# Patient Record
Sex: FEMALE | Race: WHITE | NOT HISPANIC OR LATINO | Employment: UNEMPLOYED | URBAN - METROPOLITAN AREA
[De-identification: names, ages, dates, MRNs, and addresses within clinical notes are randomized per-mention and may not be internally consistent; named-entity substitution may affect disease eponyms.]

---

## 2017-10-03 ENCOUNTER — ALLSCRIPTS OFFICE VISIT (OUTPATIENT)
Dept: OTHER | Facility: OTHER | Age: 32
End: 2017-10-03

## 2017-10-27 NOTE — CONSULTS
Assessment  1  Hypothyroidism (244 9) (E03 9)   2  Hashimoto's thyroiditis (245 2) (E06 3)   3  Multiple thyroid nodules (241 1) (E04 2)    Plan  Hashimoto's thyroiditis, Hypothyroidism    · Synthroid 137 MCG Oral Tablet (Levothyroxine Sodium); 1 Tab daily   Rx By: Cally Lowe; Dispense: 30 Days ; #:30 Tablet; Refill: 3;For: Hashimoto's thyroiditis, Hypothyroidism; VEE = Y; Verified Transmission to [facility]Y #[id number] Last Updated By: System, [employer]; 10/3/2017 9:27:58 AM   · (1) T4, FREE; Status:Active; Requested JTQ:[id number];    Perform:[facility]; VNZ:[id number]; Ordered;For:Hashimoto's thyroiditis, Hypothyroidism; Ordered By:Ariel Ray;   · (1) TSH; Status:Active; Requested ZDA:[id number];    Perform:[facility]; VYN:[id number]; Ordered;For:Hashimoto's thyroiditis, Hypothyroidism; Ordered By:Ariel Ray;  Hypothyroidism    · Follow-up visit in 6 weeks Evaluation and Treatment  Follow-up  Status: Complete  Done:  [id number]   Ordered; For: Hypothyroidism; Ordered By: Cally Lowe Performed:  Due: [id number]; Last Updated By: Lenny Kate; 10/3/2017 9:28:44 AM    Discussion/Summary  Hypothyroidism secondary to Hashimoto thyroiditis she is not taking her Synthroid properly some days which can lead to fluctuation in her levels, for now I have advised her to take Synthroid on an empty stomach, just with water, and wait 1 hour before breakfast, repeat thyroid function test after she has been on a consistent dose for at least 6 weeks  Goal TSH between 1 and 2    Discussed with her that thyroid hormone requirement increased during pregnancy and she may need adjustment in her dose when she is pregnant, she is to call the office right away when she is pregnant so thyroid function tests can be checked and dose adjusted appropriately    Multiple thyroid nodule-right-sided thyroid nodule biopsied in 2015, felt to be consistent with Hashimoto thyroiditis however underwent workup for lymphoma given the biopsy showing monoclonal B-cell, I a m  requesting records from her oncologist  Will repeat a thyroid ultrasound next year as she had an ultrasound done in July 2017, she will get me records of thyroid ultrasound-   Counseling Documentation With Imm: The patient was counseled regarding diagnostic results,-- instructions for management,-- risk factor reductions,-- impressions,-- risks and benefits of treatment options,-- importance of compliance with treatment  total time of encounter was 40 minutes-- and-- 25 minutes was spent counseling  Medication SE Review and Pt Understands Tx: Possible side effects of new medications were reviewed with the patient/guardian today  The treatment plan was reviewed with the patient/guardian  The patient/guardian understands and agrees with the treatment plan   Patient's Capacity to Self-Care: Patient is able to Self-Care  Chief Complaint  Chief Complaint Free Text Note Form: Consult  History of Present Illness  HPI: 20-year-old woman here for evaluation of thyroid dysfunction  She has History of Hashimoto's thyroiditis and hypothyroidism diagnosed as a teenager -on thyroid hormone replacement in early 25s - also has history of thyroid nodules- initially diagnosed in 2012 -largest nodules in the right lobe with varying sizes over the years -underwent fine-needle aspiration biopsy of the largest right-sided thyroid nodule in 2015 - consistent with Hashimoto's thyroiditis -how ever because of abundant lymphocytic cells of flow cytometry was performed which showed 7% of the cells with phenotypically abnormal features concerning for B-cell lymphoma- underwent workup with oncologist -according to the patient she underwent a PET scan which was negative    hypothyroidism she is currently on Synthroid 137 mcg for the past few months - dose increased from 125 mcg -she was on Synthroid 125 mcg from 2013 - till summer of 2017 it on empty stomach sometimes with coffee or just before eating - sometimes doubles up if she skips   cycles - regular   healthy pregnancy in 2013 last year at 8 1/2 weeks   able to conceive - trying for 2 years gain -30 lbs in past year , no constipation , no sleep changes and fatigue   of vitiligo since teens   FH of thyroid cancer , no h/o RT to neck   vitamin D deficiency - on vitamin D3 5000 IU DAILY            Review of Systems  Endo Adult ROS Female New Patient:   Constitutional/General: no change in ring size,-- no change in shoe size,-- chills,-- no dizziness,-- no fainting,-- fatigue,-- no fever,-- no forgetfulness,-- headache,-- no loss of sleep,-- no recent weight loss,-- no nervousness,-- no numbness,-- no temperature intolerance,-- no excessive sweating-- and-- weight gain  Muscle/Joint/Bone: back pain-- and-- hip pain, but-- no arm pain,-- no leg pain,-- no foot pain,-- no neck pain,-- no hand pain,-- no shoulder pain,-- no arm weakness,-- no back weakness,-- no hip weakness,-- no leg weakness,-- no foot weakness,-- no neck weakness,-- no hand weakness,-- no shoulder weakness,-- no arm numbness,-- no back numbness,-- no hip numbness,-- no leg numbness,-- no foot numbness,-- no neck numbness,-- no hand numbness-- and-- no shoulder numbness  Gastrointestinal: no constipation,-- no diarrhea,-- no excessive hunger,-- no excessive thirst,-- no nausea,-- no poor appetite,-- no rectal bleeding,-- no stomach pain,-- no vomiting-- and-- no vomiting blood  Cardiovascular: no chest pain,-- no hypertension,-- no irregular heart beat,-- no hypotension,-- no poor circulation,-- no rapid heart beat-- and-- no ankle swelling  Eye/Ear/Nose/Throat: gritty eyes, but-- no bleeding gums,-- no blurred vision,-- no difficulty swallowing,-- no double vision,-- no hoarseness,-- no hearing loss,-- no persistent cough,-- no sinus problems,-- not seeing flashes-- and-- not seeing halos     Skin: no easy bruising,-- no hives,-- no itching,-- no change in a mole,-- no rashes,-- no scar-- and-- no slow healing sores  Genitourinary no blood in urine,-- no frequent urination,-- no night time urination-- and-- no painful urination  Genitourinary - Reproductive 4 Children, but-- normal period,-- no bleeding between periods,-- no breast lump,-- no hot flashes,-- no nipple discharge,-- no vaginal discharge-- and-- not pregnant  date of last menstruation is unknown  the interval of the LMP is unknown periods usually lasts an unspecified duration   ROS Reviewed:   ROS reviewed  Past Medical History  1  History of migraine (V12 49) (Z86 69)   2  History of Vitiligo (709 01) (L80)  Active Problems And Past Medical History Reviewed: The active problems and past medical history were reviewed and updated today  Surgical History  1  History of Dilation And Curettage  Surgical History Reviewed: The surgical history was reviewed and updated today  Family History  Mother    1  Family history of multiple sclerosis (V17 2) (Z82 0)  Sister    2  Family history of celiac disease (V18 59) (Z83 79)  Brother    3  Family history of Hashimoto thyroiditis (V18 19) (Z83 49)  Family History Reviewed: The family history was reviewed and updated today  Social History  Social History Reviewed: The social history was reviewed and updated today  Current Meds   1  Prenatal Complete TABS Recorded   2  Synthroid 137 MCG Oral Tablet; 1 TAB DAILY Recorded   3  Vitamin D3 5000 UNIT Oral Capsule Recorded  Medication List Reviewed: The medication list was reviewed and updated today  Allergies  1  No Known Drug Allergies    Vitals  Vital Signs    Recorded: 29DCH4976 08:43AM   Heart Rate 72   Systolic 840   Diastolic 84   Height 5 ft 8 in   Weight 199 lb 4 00 oz   BMI Calculated 30 3   BSA Calculated 2 05     Physical Exam    Constitutional   General appearance: No acute distress, well appearing and well nourished      Eyes   Conjunctiva and lids: No swelling, erythema, or discharge  Pupils: Equal, round and reactive to light  The sclera are anicteric  Extraocular movements are intact  Ears, Nose, Mouth, and Throat   External inspection of ears, nose and lips: Normal     Exam of Head: The head is atraumatic and normocephalic  Neck: Abnormal  -- Mild thyromegaly, no distinct thyroid nodules  Pulmonary   Auscultation of lungs: Clear to auscultation bilaterally with normal chest expansion  Cardiovascular   Auscultation of heart: Normal rate and rhythm with no murmurs, gallops or rubs  Examination of extremities for edema and/or varicosities: Normal     Abdomen   Abdomen: Abdomen is soft, non-tender with normal bowel sounds  Lymphatic   Palpation of lymph nodes: No supraclavicular or suboccipital lymphadenopathy  Musculoskeletal   Gait and station: Normal     Inspection/palpation of joints, bones, and muscles: Muscle bulk and tone is normal     Skin   Skin and subcutaneous tissue: Normal skin temperature and color  Neurologic   Motor Strength: Strength is 5/5 bilaterally  Psychiatric   Orientation to person, place and time: Normal     Mood and affect: Affect and attention span are normal        Results/Data  Office Record Review: I have reviewed the office records and my summary is as follows: I have requested any additional records  I have reviewed office records from Dr Justin Danielle from December 2016 regarding hypothyroidism and thyroid noduleshave requested medical records from oncologist as well as fertility specialist  I have reviewed laboratory results as follows: January 2017-TSH 1 89, free T4 1 3  Diagnostic Studies Reviewed:     Ultrasound July 2016-thyroid ultrasound-heterogenously gland with areas of subtle focal nodularity, right mid pole nodule 0 9 x 0 3 x 1 cm, upper pole nodule 0 6 x 0 9 x 0 4 cm and mid pole nodule 0 3 x 0 2 x 0 3 cmmid pole 0 6 x 0 3 x 0 6 cm nodule     Diagnostic Review Fine-needle aspiration biopsy right 1 7 cm thyroid nodule-April 2015phenotypically abnormal cells detected- monoclonal B cells phenotype, kappa positive, findings suggest involvement by a B-cell lymphoma such as follicular lymphoma however clinical and morphologic correlation needed  Future Appointments    Date/Time Provider Specialty Site   11/21/2017 11:40 AM LES Varela   Endocrinology [facility] ENDOCRINOLOGY     Signatures   Electronically signed by : LES Alvarez ; Oct  3 2017  1:46PM EST                       (Author)

## 2017-11-14 DIAGNOSIS — E06.3 AUTOIMMUNE THYROIDITIS: ICD-10-CM

## 2017-11-14 DIAGNOSIS — E03.9 HYPOTHYROIDISM: ICD-10-CM

## 2018-01-14 VITALS
BODY MASS INDEX: 30.2 KG/M2 | DIASTOLIC BLOOD PRESSURE: 84 MMHG | HEART RATE: 72 BPM | WEIGHT: 199.25 LBS | HEIGHT: 68 IN | SYSTOLIC BLOOD PRESSURE: 110 MMHG

## 2018-03-06 ENCOUNTER — APPOINTMENT (OUTPATIENT)
Dept: LAB | Facility: CLINIC | Age: 33
End: 2018-03-06
Payer: COMMERCIAL

## 2018-03-06 DIAGNOSIS — E06.3 AUTOIMMUNE THYROIDITIS: ICD-10-CM

## 2018-03-06 DIAGNOSIS — E03.9 HYPOTHYROIDISM: ICD-10-CM

## 2018-03-06 LAB
T4 FREE SERPL-MCNC: 1.21 NG/DL (ref 0.76–1.46)
TSH SERPL DL<=0.05 MIU/L-ACNC: 0.16 UIU/ML (ref 0.36–3.74)

## 2018-03-06 PROCEDURE — 84443 ASSAY THYROID STIM HORMONE: CPT

## 2018-03-06 PROCEDURE — 36415 COLL VENOUS BLD VENIPUNCTURE: CPT

## 2018-03-06 PROCEDURE — 84439 ASSAY OF FREE THYROXINE: CPT

## 2018-03-15 ENCOUNTER — TELEPHONE (OUTPATIENT)
Dept: ENDOCRINOLOGY | Facility: CLINIC | Age: 33
End: 2018-03-15

## 2018-03-15 ENCOUNTER — OFFICE VISIT (OUTPATIENT)
Dept: FAMILY MEDICINE CLINIC | Facility: CLINIC | Age: 33
End: 2018-03-15
Payer: COMMERCIAL

## 2018-03-15 VITALS
HEART RATE: 76 BPM | BODY MASS INDEX: 30.87 KG/M2 | SYSTOLIC BLOOD PRESSURE: 122 MMHG | DIASTOLIC BLOOD PRESSURE: 84 MMHG | WEIGHT: 208.4 LBS | RESPIRATION RATE: 16 BRPM | HEIGHT: 69 IN

## 2018-03-15 DIAGNOSIS — E04.1 THYROID NODULE: ICD-10-CM

## 2018-03-15 DIAGNOSIS — N97.9 FEMALE INFERTILITY, SECONDARY: ICD-10-CM

## 2018-03-15 DIAGNOSIS — L25.9 CONTACT DERMATITIS, UNSPECIFIED CONTACT DERMATITIS TYPE, UNSPECIFIED TRIGGER: Primary | ICD-10-CM

## 2018-03-15 DIAGNOSIS — E03.8 HYPOTHYROIDISM DUE TO HASHIMOTO'S THYROIDITIS: ICD-10-CM

## 2018-03-15 DIAGNOSIS — E06.3 HYPOTHYROIDISM DUE TO HASHIMOTO'S THYROIDITIS: ICD-10-CM

## 2018-03-15 PROBLEM — L80 VITILIGO: Status: ACTIVE | Noted: 2018-03-15

## 2018-03-15 PROBLEM — E04.2 MULTIPLE THYROID NODULES: Status: ACTIVE | Noted: 2017-10-03

## 2018-03-15 PROCEDURE — 99203 OFFICE O/P NEW LOW 30 MIN: CPT | Performed by: PHYSICIAN ASSISTANT

## 2018-03-15 RX ORDER — LEVOTHYROXINE SODIUM 137 MCG
137 TABLET ORAL DAILY
Refills: 3 | COMMUNITY
Start: 2018-02-03 | End: 2018-03-21 | Stop reason: SDUPTHER

## 2018-03-15 RX ORDER — ACETAMINOPHEN 160 MG
2000 TABLET,DISINTEGRATING ORAL DAILY
COMMUNITY
End: 2020-12-03 | Stop reason: ALTCHOICE

## 2018-03-15 RX ORDER — TRIAMCINOLONE ACETONIDE 5 MG/G
CREAM TOPICAL 3 TIMES DAILY
Qty: 30 G | Refills: 0 | Status: SHIPPED | OUTPATIENT
Start: 2018-03-15 | End: 2018-09-06 | Stop reason: ALTCHOICE

## 2018-03-15 RX ORDER — ASPIRIN 81 MG/1
81 TABLET ORAL DAILY
COMMUNITY
End: 2018-09-06 | Stop reason: ALTCHOICE

## 2018-03-15 NOTE — TELEPHONE ENCOUNTER
Osmin Colindres To Sent  3/15/2018 10:50 AM   Hi Dr Nury Tucker,   I am sorry I have not been back to follow-up with you  I was hoping to see you last week, but we had to cancel due to the weather  I am now scheduled to follow-up with a CRNP in your group based upon availability, on 4/3  At our initial meeting, we discussed getting a good  on my synthroid dose  I have been taking 137mcg for some time now, probably close to 2 years, with some lability in my TSH  Since we met in the fall I have been very careful to take my morning dose on an empty stomach upon awakening  I have had a handful of TSH checks with [facility], and they are faxing those results to you today  I also had my TSH and Free T4 checked based on your order last week  You will see over the past 6+ weeks my Tsh has gone down to now being a bit too low at 0 10, 0 09 (most recent result from [facility])  Only change that I have made in these past 6 weeks is reintroducing exercise in my routine, not sure if that could explain this? If you wish for me to continue on 137mcg, or make an adjustment, I do need a new prescription for synthroid brand necessary to my [facility] pharmacy for one month supply  I will then see your team on 4/3 and go from there  Thank you!      Osmin Colindres

## 2018-03-15 NOTE — PROGRESS NOTES
Assessment/Plan:    1  Contact dermatitis, unspecified contact dermatitis type, unspecified trigger    - c/w cream as needed, never longer than 2 weeks at a time  - triamcinolone (KENALOG) 0 5 % cream; Apply topically 3 (three) times a day  Dispense: 30 g; Refill: 0    2  Hypothyroidism due to Hashimoto's thyroiditis    - on synthroid 137 mcg once daily    3  Thyroid nodule    - yearly u/s  - under care [facility]    4  Secondary Infertility    - under care [facility]    F/u as needed      Subjective:   Chief Complaint   Patient presents with    [facility]      Patient ID: Osmin Colindres is a 27 y o  female  Here to [facility] care  CRNP moved from [facility] in 2017   is a Pulmonary Critical Care doctor for , patient was a former CRNP in pulm also  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  Thyroid megaly with nodule, 1cm > was biopsied consistent with hashimotos  Very active autoimmune nodule  Saw nodule specialist, lymphoma specialist  Ultrasound once yearly  Dr Óscar Bro to follow  Also has "contact dermatitis" on triamcinolone as needed, needs refill  Under going fertitlity treatment with [facility]               The following portions of the patient's history were reviewed and updated as appropriate: allergies, current medications, past family history, past medical history, past social history, past surgical history and problem list     Past Medical History:   Diagnosis Date    H/O Hashimoto thyroiditis     Migraine     Thyroid nodule     Vertigo     Vitiligo     Vitiligo      Past Surgical History:   Procedure Laterality Date    BREAST SURGERY      RECONSTRUCTION     SECTION       SECTION      DILATION AND CURETTAGE OF UTERUS      REDUCTION MAMMAPLASTY       Family History   Problem Relation Age of Onset    Hypertension Mother     Multiple sclerosis Mother     Hashimoto's thyroiditis Mother     Celiac disease Sister    Mavis Cousin Thyroid disease Sister     Hashimoto's thyroiditis Brother     Lupus Brother     No Known Problems Father     Mental illness Neg Hx     Substance Abuse Neg Hx      Social History     Social History    Marital status: /Civil Union     Spouse name: N/A    Number of children: N/A    Years of education: N/A     Occupational History    Not on file  Social History Main Topics    Smoking status: Never Smoker    Smokeless tobacco: Never Used    Alcohol use 3 0 oz/week     5 Glasses of wine per week      Comment: occasionally    Drug use: No    Sexual activity: Not on file     Other Topics Concern    Not on file     Social History Narrative    CAFFEINE USE       Current Outpatient Prescriptions:     aspirin (ECOTRIN LOW STRENGTH) 81 mg EC tablet, Take 81 mg by mouth daily, Disp: , Rfl:     Cholecalciferol (VITAMIN D3) 2000 units capsule, Take by mouth, Disp: , Rfl:     PRENATAL MULTIVIT-MIN-FE-FA PO, Take 1 tablet by mouth, Disp: , Rfl:     SYNTHROID 137 MCG tablet, Take 137 mcg by mouth daily, Disp: , Rfl: 3    Review of Systems          Objective:    Vitals:    03/15/18 0925   BP: 122/84   BP Location: Left arm   Patient Position: Sitting   Cuff Size: Adult   Pulse: 76   Resp: 16   Weight: 94 5 kg (208 lb 6 4 oz)   Height: 5' 9" (1 753 m)        Physical Exam   Constitutional: She is oriented to person, place, and time  She appears well-developed and well-nourished  Cardiovascular: Normal rate and normal heart sounds  Pulmonary/Chest: Effort normal and breath sounds normal    Neurological: She is alert and oriented to person, place, and time  Skin: Skin is warm  Psychiatric: She has a normal mood and affect

## 2018-03-15 NOTE — TELEPHONE ENCOUNTER
TSH from March was slightly low at 0 15, for now take Synthroid 137 mcg 1 tablet Monday to Saturday and half a tablet on Sunday    Keep the upcoming appointment in April

## 2018-03-21 DIAGNOSIS — E03.9 HYPOTHYROIDISM, UNSPECIFIED TYPE: Primary | ICD-10-CM

## 2018-03-21 RX ORDER — LEVOTHYROXINE SODIUM 137 MCG
TABLET ORAL
Qty: 30 TABLET | Refills: 3 | Status: SHIPPED | OUTPATIENT
Start: 2018-03-21 | End: 2018-03-22 | Stop reason: SDUPTHER

## 2018-03-22 DIAGNOSIS — E03.9 HYPOTHYROIDISM, UNSPECIFIED TYPE: ICD-10-CM

## 2018-03-22 RX ORDER — LEVOTHYROXINE SODIUM 137 MCG
137 TABLET ORAL DAILY
Qty: 30 TABLET | Refills: 0 | Status: SHIPPED | OUTPATIENT
Start: 2018-03-22 | End: 2018-07-19 | Stop reason: SDUPTHER

## 2018-04-03 ENCOUNTER — OFFICE VISIT (OUTPATIENT)
Dept: ENDOCRINOLOGY | Facility: CLINIC | Age: 33
End: 2018-04-03
Payer: COMMERCIAL

## 2018-04-03 VITALS
HEART RATE: 80 BPM | BODY MASS INDEX: 31.5 KG/M2 | WEIGHT: 213.3 LBS | DIASTOLIC BLOOD PRESSURE: 68 MMHG | SYSTOLIC BLOOD PRESSURE: 108 MMHG

## 2018-04-03 DIAGNOSIS — E04.2 MULTIPLE THYROID NODULES: ICD-10-CM

## 2018-04-03 DIAGNOSIS — E03.8 HYPOTHYROIDISM DUE TO HASHIMOTO'S THYROIDITIS: Primary | ICD-10-CM

## 2018-04-03 DIAGNOSIS — E06.3 HYPOTHYROIDISM DUE TO HASHIMOTO'S THYROIDITIS: Primary | ICD-10-CM

## 2018-04-03 PROCEDURE — 99213 OFFICE O/P EST LOW 20 MIN: CPT | Performed by: NURSE PRACTITIONER

## 2018-04-03 RX ORDER — LEVOTHYROXINE SODIUM 137 UG/1
137 TABLET ORAL DAILY
COMMUNITY
Start: 2017-05-12 | End: 2018-04-03

## 2018-04-03 NOTE — PROGRESS NOTES
Established Patient Progress Note       Chief Complaint   Patient presents with    Hyperthyroidism          History of Present Illness:     Gavin Pierce is a 27 y o  female with a history of hypothyroidism due to hashimoto's and thyroid nodules  Her most recent TSH was low at 0 159 and T4 was 1 21  Her dose was recently changed on 3/19  She is currently taking Synthroid 137 mcg 1 tablet Monday through Saturday and half a tablet on   She reports symptoms of weight gain and fatigue  For the thyroid nodules her last thyroid us showed multiple thyroid nodules that were herterogenous and no evidence of change from prior us in         Patient Active Problem List   Diagnosis    Cystic fibrosis carrier, antepartum    Hashimoto's thyroiditis    Hx of bilateral breast reduction surgery    Hypothyroidism due to Hashimoto's thyroiditis    Multiple thyroid nodules    Rh negative, maternal    Thyroid nodule    Vitamin D deficiency    Vitiligo    Female infertility, secondary      Past Medical History:   Diagnosis Date    H/O Hashimoto thyroiditis     Migraine     Thyroid nodule     Vertigo     Vitiligo     Vitiligo       Past Surgical History:   Procedure Laterality Date    BREAST SURGERY      RECONSTRUCTION     SECTION       SECTION      DILATION AND CURETTAGE OF UTERUS      REDUCTION MAMMAPLASTY        Family History   Problem Relation Age of Onset    Hypertension Mother     Multiple sclerosis Mother     Hashimoto's thyroiditis Mother     Celiac disease Sister     Thyroid disease Sister     Hashimoto's thyroiditis Brother     Lupus Brother     No Known Problems Father     Mental illness Neg Hx     Substance Abuse Neg Hx      Social History   Substance Use Topics    Smoking status: Never Smoker    Smokeless tobacco: Never Used    Alcohol use 3 0 oz/week     5 Glasses of wine per week      Comment: occasionally     Allergies   Allergen Reactions    Seasonal Ic [Cholestatin] Allergic Rhinitis       Current Outpatient Prescriptions:     aspirin (ECOTRIN LOW STRENGTH) 81 mg EC tablet, Take 81 mg by mouth daily, Disp: , Rfl:     Cholecalciferol (VITAMIN D3) 2000 units capsule, Take by mouth, Disp: , Rfl:     PRENATAL MULTIVIT-MIN-FE-FA PO, Take 1 tablet by mouth, Disp: , Rfl:     progesterone 200 mg vaginal suppository, Insert 200 mg into the vagina daily, Disp: , Rfl:     SYNTHROID 137 MCG tablet, Take 1 tablet (137 mcg total) by mouth daily, Disp: 30 tablet, Rfl: 0    triamcinolone (KENALOG) 0 5 % cream, Apply topically 3 (three) times a day, Disp: 30 g, Rfl: 0    Review of Systems   Constitutional: Positive for fatigue  Negative for chills and fever  Positive for weight gain    HENT: Negative for sore throat and trouble swallowing  Eyes: Negative for visual disturbance  Respiratory: Negative for shortness of breath  Cardiovascular: Negative for chest pain and palpitations  Gastrointestinal: Negative for abdominal pain, constipation and diarrhea  Endocrine: Negative for cold intolerance, heat intolerance, polydipsia, polyphagia and polyuria  Genitourinary: Negative for frequency  Musculoskeletal: Negative for arthralgias and myalgias  Skin: Negative for rash  Neurological: Negative for dizziness and tremors  Hematological: Negative for adenopathy  Psychiatric/Behavioral: Negative for sleep disturbance  All other systems reviewed and are negative  Physical Exam:  Body mass index is 31 5 kg/m²  /68   Pulse 80   Wt 96 8 kg (213 lb 4 8 oz)   BMI 31 50 kg/m²    Wt Readings from Last 3 Encounters:   04/03/18 96 8 kg (213 lb 4 8 oz)   03/15/18 94 5 kg (208 lb 6 4 oz)   10/03/17 90 4 kg (199 lb 4 oz)       Physical Exam   Constitutional: She is oriented to person, place, and time  She appears well-developed and well-nourished  No distress  HENT:   Head: Normocephalic and atraumatic     Eyes: Conjunctivae are normal  Pupils are equal, round, and reactive to light  Neck: Normal range of motion  Neck supple  No thyromegaly present  Cardiovascular: Normal rate, regular rhythm and normal heart sounds  Pulmonary/Chest: Effort normal and breath sounds normal  No respiratory distress  She has no wheezes  She has no rales  Abdominal: Soft  Bowel sounds are normal  She exhibits no distension  There is no tenderness  Musculoskeletal: Normal range of motion  She exhibits no edema  Neurological: She is alert and oriented to person, place, and time  Skin: Skin is warm and dry  Psychiatric: She has a normal mood and affect  Vitals reviewed  Labs:       Component      Latest Ref Rng & Units 3/6/2018   TSH 3RD GENERATON      0 358 - 3 740 uIU/mL 0 159 (L)   Free T4      0 76 - 1 46 ng/dL 1 21         Impression & Plan:    Problem List Items Addressed This Visit     Hypothyroidism due to Hashimoto's thyroiditis - Primary     Her dose was recently changed on 3/19 due to low TSH  She reports weight gain and fatigue  Check TSH and T4 in 6 weeks  Relevant Orders    T4, free    TSH, 3rd generation    Multiple thyroid nodules     July 2017 US showed stable nodules  She was given script for repeat US         Relevant Orders    US thyroid          Orders Placed This Encounter   Procedures    US thyroid     Standing Status:   Future     Standing Expiration Date:   4/3/2019     Scheduling Instructions:      No prep required  Please bring your physician order, insurance cards, a form of photo ID and a list of your medications with you  Arrive 15 minutes prior to your appointment time in order to register  Order Specific Question:   Reason for Exam:     Answer:   thyroid nodules     Order Specific Question:   Is the patient pregnant? Answer:   No    T4, free     Standing Status:   Future     Standing Expiration Date:   4/3/2019    TSH, 3rd generation     This is a patient instruction: This test is non-fasting   Please drink two glasses of water morning of bloodwork  Standing Status:   Future     Standing Expiration Date:   4/3/2019       Patient Instructions   Check TSH and T4 in 6 weeks  Due for repeat thyroid Us, script given         Discussed with the patient and all questioned fully answered  She will call me if any problems arise  Follow-up appointment in 3 months       Counseled patient on diagnostic results, prognosis, risk and benefit of treatment options, instruction for management, importance of treatment compliance, Risk  factor reduction and impressions      Karli Swain [address]

## 2018-04-03 NOTE — ASSESSMENT & PLAN NOTE
Her dose was recently changed on 3/19 due to low TSH  She reports weight gain and fatigue  Check TSH and T4 in 6 weeks

## 2018-05-15 ENCOUNTER — HOSPITAL ENCOUNTER (OUTPATIENT)
Dept: ULTRASOUND IMAGING | Facility: HOSPITAL | Age: 33
Discharge: HOME | End: 2018-05-15
Payer: COMMERCIAL

## 2018-05-15 DIAGNOSIS — E04.2 MULTIPLE THYROID NODULES: ICD-10-CM

## 2018-05-15 PROCEDURE — 76536 US EXAM OF HEAD AND NECK: CPT

## 2018-05-17 ENCOUNTER — TELEPHONE (OUTPATIENT)
Dept: ENDOCRINOLOGY | Facility: CLINIC | Age: 33
End: 2018-05-17

## 2018-07-19 ENCOUNTER — OFFICE VISIT (OUTPATIENT)
Dept: ENDOCRINOLOGY | Facility: CLINIC | Age: 33
End: 2018-07-19
Payer: COMMERCIAL

## 2018-07-19 VITALS
HEIGHT: 68 IN | SYSTOLIC BLOOD PRESSURE: 110 MMHG | WEIGHT: 213 LBS | DIASTOLIC BLOOD PRESSURE: 76 MMHG | BODY MASS INDEX: 32.28 KG/M2

## 2018-07-19 DIAGNOSIS — E03.9 HYPOTHYROIDISM, UNSPECIFIED TYPE: ICD-10-CM

## 2018-07-19 DIAGNOSIS — N97.9 FEMALE INFERTILITY, SECONDARY: ICD-10-CM

## 2018-07-19 DIAGNOSIS — E04.1 THYROID NODULE: ICD-10-CM

## 2018-07-19 DIAGNOSIS — E55.9 VITAMIN D DEFICIENCY: ICD-10-CM

## 2018-07-19 DIAGNOSIS — E04.2 MULTIPLE THYROID NODULES: ICD-10-CM

## 2018-07-19 DIAGNOSIS — E03.8 HYPOTHYROIDISM DUE TO HASHIMOTO'S THYROIDITIS: Primary | ICD-10-CM

## 2018-07-19 DIAGNOSIS — E06.3 HYPOTHYROIDISM DUE TO HASHIMOTO'S THYROIDITIS: Primary | ICD-10-CM

## 2018-07-19 PROCEDURE — 99214 OFFICE O/P EST MOD 30 MIN: CPT | Performed by: INTERNAL MEDICINE

## 2018-07-19 RX ORDER — LEVOTHYROXINE SODIUM 137 MCG
TABLET ORAL
Qty: 90 TABLET | Refills: 3 | Status: SHIPPED | OUTPATIENT
Start: 2018-07-19 | End: 2018-08-10

## 2018-07-19 NOTE — PROGRESS NOTES
Trevor Arm 27 y o  female MRN: [id number]    Encounter: [id number]      Assessment/Plan     Problem List Items Addressed This Visit     Hypothyroidism due to Hashimoto's thyroiditis - Primary     TSH in May  optimal-continue Synthroid at current dose-will repeat thyroid function test in the next couple of months         Relevant Medications    SYNTHROID 137 MCG tablet    Other Relevant Orders    T4, free Lab Collect    TSH, 3rd generation Lab Collect    Multiple thyroid nodules     Recent thyroid ultrasound did not show any distinct thyroid nodules-will monitor         Relevant Medications    SYNTHROID 137 MCG tablet    Thyroid nodule    Relevant Medications    SYNTHROID 137 MCG tablet    Vitamin D deficiency     Advised to restart vitamin D3 2000 international units daily         Relevant Orders    Vitamin D 25 hydroxy Lab Collect    Female infertility, secondary     She is being followed by reproductive medicine           Other Visit Diagnoses     Hypothyroidism, unspecified type        Relevant Medications    SYNTHROID 137 MCG tablet        CC:   Hypothyroidism    History of Present Illness      HPI:  77-year-old woman with history of hypothyroidism secondary to Hashimoto thyroiditis, thyroid nodule, vitamin-D deficiency here for follow-up  For hypothyroidism she is currently on Synthroid 137 mcg 1 tab mon-sat and 1/2 on Sunday and has been taking it regularly and properly  Complains of some fatigue, weight has been stable, denies any constipation or cold intolerance  No sleep disturbances  History of thyroid nodules and underwent fine-needle aspiration biopsy of right sided  thyroid thyroid nodule in 2015 which was consistent with Hashimoto thyroiditis    Denies any family history of thyroid cancer, no history of radiation to her neck    History of vitamin-D deficiency-on vitamin D3 2000 international units daily-has not been taking it for the past few weeks    Review of Systems   Constitutional: Positive for fatigue  Negative for unexpected weight change  Eyes: Negative for visual disturbance  Respiratory: Negative for cough and shortness of breath  Cardiovascular: Negative for chest pain, palpitations and leg swelling  Gastrointestinal: Negative for constipation, diarrhea, nausea and vomiting  Endocrine: Negative for polydipsia and polyuria  Musculoskeletal: Negative for gait problem  Skin: Negative for color change, pallor, rash and wound  Psychiatric/Behavioral: Positive for sleep disturbance  The patient is not nervous/anxious  All other systems reviewed and are negative        Historical Information   Past Medical History:   Diagnosis Date    H/O Hashimoto thyroiditis     Migraine     Thyroid nodule     Vertigo     Vitiligo     Vitiligo      Past Surgical History:   Procedure Laterality Date    BREAST SURGERY      RECONSTRUCTION     SECTION       SECTION      DILATION AND CURETTAGE OF UTERUS      REDUCTION MAMMAPLASTY       Social History   History   Alcohol Use    3 0 oz/week    5 Glasses of wine per week     Comment: occasionally     History   Drug Use No     History   Smoking Status    Never Smoker   Smokeless Tobacco    Never Used     Family History:   Family History   Problem Relation Age of Onset    Hypertension Mother     Multiple sclerosis Mother     Hashimoto's thyroiditis Mother     Celiac disease Sister     Thyroid disease Sister     Hashimoto's thyroiditis Brother     Lupus Brother     No Known Problems Father     Mental illness Neg Hx     Substance Abuse Neg Hx        Meds/Allergies   Current Outpatient Prescriptions   Medication Sig Dispense Refill    aspirin (ECOTRIN LOW STRENGTH) 81 mg EC tablet Take 81 mg by mouth daily      Cholecalciferol (VITAMIN D3) 2000 units capsule Take by mouth      PRENATAL MULTIVIT-MIN-FE-FA PO Take 1 tablet by mouth      SYNTHROID 137 MCG tablet 1 tab mon-sat and 1/2 on  90 tablet 3    triamcinolone (KENALOG) 0 5 % cream Apply topically 3 (three) times a day 30 g 0    progesterone 200 mg vaginal suppository Insert 200 mg into the vagina daily       No current facility-administered medications for this visit  Allergies   Allergen Reactions    Seasonal Ic [Cholestatin] Allergic Rhinitis       Objective   Vitals: Blood pressure 110/76, height 5' 8" (1 727 m), weight 96 6 kg (213 lb)  Physical Exam   Constitutional: She is oriented to person, place, and time  She appears well-developed and well-nourished  No distress  HENT:   Head: Normocephalic and atraumatic  Mouth/Throat: No oropharyngeal exudate  Eyes: Conjunctivae and EOM are normal  No scleral icterus  Neck: Normal range of motion  Neck supple  No thyromegaly present  Cardiovascular: Normal rate, regular rhythm and normal heart sounds  No murmur heard  Pulmonary/Chest: Effort normal and breath sounds normal  No respiratory distress  She has no wheezes  She has no rales  Abdominal: Soft  Bowel sounds are normal  She exhibits no distension  There is no tenderness  There is no rebound  Musculoskeletal: Normal range of motion  She exhibits no edema or deformity  Lymphadenopathy:     She has no cervical adenopathy  Neurological: She is alert and oriented to person, place, and time  Skin: Skin is warm and dry  No rash noted  No erythema  No pallor  Psychiatric: She has a normal mood and affect  Her behavior is normal  Judgment and thought content normal        The history was obtained from the review of the chart, patient  Lab Results:   Lab Results   Component Value Date/Time    TSH 3RD GENERATON 0 159 (L) 03/06/2018 09:49 AM    Free T4 1 21 03/06/2018 09:49 AM       May 2018- tsh 0 9     Imaging Studies:     Study Result     THYROID ULTRASOUND     INDICATION:    E04 2: Nontoxic multinodular goiter      Hashimoto's thyroiditis        COMPARISON:  None     TECHNIQUE:   Ultrasound of the thyroid was performed with a high frequency linear transducer in transverse and sagittal planes including volumetric imaging sweeps as well as traditional still imaging technique      FINDINGS:  Thyroid parenchyma is diffusely heterogeneous in echotexture      Right lobe:  3 8 x 1 2 x 1 5 cm  Left lobe:  3 4 x 1 0 x 1 5 cm  Isthmus:  0 4 cm      No discrete thyroid nodules are demonstrated         Hypoechoic nodules are seen posterior to the lower lobes, and likely represent lymph nodes      IMPRESSION:        1  Heterogeneous thyroid gland without discrete nodules identified  Appearance most consistent with known Hashimoto's thyroiditis          2  Hypoechoic nodules posterior to the lower lobes likely represent lymph nodes              I have personally reviewed pertinent reports  Portions of the record may have been created with voice recognition software  Occasional wrong word or "sound a like" substitutions may have occurred due to the inherent limitations of voice recognition software  Read the chart carefully and recognize, using context, where substitutions have occurred

## 2018-07-19 NOTE — ASSESSMENT & PLAN NOTE
TSH in May  optimal-continue Synthroid at current dose-will repeat thyroid function test in the next couple of months

## 2018-08-08 DIAGNOSIS — N91.2 AMENORRHEA: Primary | ICD-10-CM

## 2018-08-09 ENCOUNTER — LAB (OUTPATIENT)
Dept: LAB | Facility: CLINIC | Age: 33
End: 2018-08-09
Payer: COMMERCIAL

## 2018-08-09 DIAGNOSIS — E03.8 HYPOTHYROIDISM DUE TO HASHIMOTO'S THYROIDITIS: ICD-10-CM

## 2018-08-09 DIAGNOSIS — E55.9 VITAMIN D DEFICIENCY: ICD-10-CM

## 2018-08-09 DIAGNOSIS — N91.2 AMENORRHEA: ICD-10-CM

## 2018-08-09 DIAGNOSIS — E06.3 HYPOTHYROIDISM DUE TO HASHIMOTO'S THYROIDITIS: ICD-10-CM

## 2018-08-09 LAB
25(OH)D3 SERPL-MCNC: 22.4 NG/ML (ref 30–100)
B-HCG SERPL-ACNC: ABNORMAL MIU/ML
T4 FREE SERPL-MCNC: 1.01 NG/DL (ref 0.76–1.46)
TSH SERPL DL<=0.05 MIU/L-ACNC: 3.31 UIU/ML (ref 0.36–3.74)

## 2018-08-09 PROCEDURE — 84439 ASSAY OF FREE THYROXINE: CPT

## 2018-08-09 PROCEDURE — 84702 CHORIONIC GONADOTROPIN TEST: CPT

## 2018-08-09 PROCEDURE — 36415 COLL VENOUS BLD VENIPUNCTURE: CPT

## 2018-08-09 PROCEDURE — 84443 ASSAY THYROID STIM HORMONE: CPT

## 2018-08-09 PROCEDURE — 82306 VITAMIN D 25 HYDROXY: CPT

## 2018-08-10 ENCOUNTER — TELEPHONE (OUTPATIENT)
Dept: ENDOCRINOLOGY | Facility: CLINIC | Age: 33
End: 2018-08-10

## 2018-08-10 DIAGNOSIS — E03.9 HYPOTHYROIDISM, UNSPECIFIED TYPE: Primary | ICD-10-CM

## 2018-08-10 RX ORDER — LEVOTHYROXINE SODIUM 137 MCG
137 TABLET ORAL DAILY
COMMUNITY
End: 2018-10-17 | Stop reason: SDUPTHER

## 2018-08-10 NOTE — PROGRESS NOTES
Please call the patient regarding her abnormal result   Positive pregnancy test - tsh above goal for pregnancy - increase synthroid 1 tab mon-sat and 2 on Sunday and repeat tsh and free t4 in 4 weeks

## 2018-08-10 NOTE — TELEPHONE ENCOUNTER
----- Message from Jennifer Shah MD sent at 8/9/2018  9:14 PM EDT -----  Please call the patient regarding her abnormal result   Positive pregnancy test - tsh above goal for pregnancy - increase synthroid 1 tab mon-sat and 2 on Sunday and repeat tsh and free t4 in 4 weeks

## 2018-08-10 NOTE — TELEPHONE ENCOUNTER
----- Message from Tanner Duarte MD sent at 8/9/2018  9:14 PM EDT -----  Please call the patient regarding her abnormal result   Positive pregnancy test - tsh above goal for pregnancy - increase synthroid 1 tab mon-sat and 2 on Sunday and repeat tsh and free t4 in 4 weeks

## 2018-09-06 ENCOUNTER — OFFICE VISIT (OUTPATIENT)
Dept: OBGYN CLINIC | Facility: CLINIC | Age: 33
End: 2018-09-06
Payer: COMMERCIAL

## 2018-09-06 VITALS — DIASTOLIC BLOOD PRESSURE: 82 MMHG | BODY MASS INDEX: 32.54 KG/M2 | WEIGHT: 214 LBS | SYSTOLIC BLOOD PRESSURE: 154 MMHG

## 2018-09-06 DIAGNOSIS — N91.2 AMENORRHEA: Primary | ICD-10-CM

## 2018-09-06 PROBLEM — E06.3 HASHIMOTO'S THYROIDITIS: Status: RESOLVED | Noted: 2017-10-03 | Resolved: 2018-09-06

## 2018-09-06 PROCEDURE — 99202 OFFICE O/P NEW SF 15 MIN: CPT | Performed by: PHYSICIAN ASSISTANT

## 2018-09-06 PROCEDURE — 76801 OB US < 14 WKS SINGLE FETUS: CPT | Performed by: PHYSICIAN ASSISTANT

## 2018-09-06 RX ORDER — ONDANSETRON 4 MG/1
4 TABLET, FILM COATED ORAL EVERY 8 HOURS PRN
Refills: 1 | COMMUNITY
Start: 2018-08-18 | End: 2018-12-26 | Stop reason: ALTCHOICE

## 2018-09-06 NOTE — PROGRESS NOTES
Early OB Ultrasound Procedure Note  Anya Phillips  YOB: 1985      Referring Physician: No ref  provider found  Technician: Study performed by the interpreting physician assistant    Indications:  amenorrhea   /82   Wt 97 1 kg (214 lb)   LMP 2018 (Exact Date)   BMI 32 54 kg/m²     Patient's last menstrual period was 2018 (exact date)  , , with EGA of  10 weeks and 2   days      TOP  Full term C/S for breech presentation, no other complications  SAB 1st trimester 5-6wks, (+) D&C    Other chemical pregnancies, s/p multiple [facility] and [facility], this pregnancy is spontaneous after antibiotics for endometritis    Patient denies vaginal bleeding or brown discharge    Medical hx: sees Dr Mary Villagran for hashimotos thyroiditis, has rx for blood work, going today  CF carrier,  negative    Procedure Details  A gestational sac is seen containing a yolk sac and a rice embryo  The embryonic crown-rump length measures 164 cm  and calculates to an estimated gestational age of 9 weeks and 2   Days    Embryonic cardiac activity is  present @ 164 bpm  Description of fetal anatomy Normal  Description of ovaries: normal  Description of uterus: normal    Findings:  Viable, rice intrauterine pregnancy at 10 weeks,  2 days, with a calculated EDC of 2019 by LMP

## 2018-09-06 NOTE — ASSESSMENT & PLAN NOTE
(+) viable IUP, size consistent with dates  FLAQUITA by LMP 4/2/19  Questions answered and pt congratulated  Interested in cell free DNA testing  RTO for interview and then PN1

## 2018-09-10 ENCOUNTER — LAB (OUTPATIENT)
Dept: LAB | Facility: CLINIC | Age: 33
End: 2018-09-10
Payer: COMMERCIAL

## 2018-09-10 DIAGNOSIS — E03.9 HYPOTHYROIDISM, UNSPECIFIED TYPE: ICD-10-CM

## 2018-09-10 LAB
T4 FREE SERPL-MCNC: 1.38 NG/DL (ref 0.76–1.46)
TSH SERPL DL<=0.05 MIU/L-ACNC: 0.92 UIU/ML (ref 0.36–3.74)

## 2018-09-10 PROCEDURE — 36415 COLL VENOUS BLD VENIPUNCTURE: CPT

## 2018-09-10 PROCEDURE — 84439 ASSAY OF FREE THYROXINE: CPT

## 2018-09-10 PROCEDURE — 84443 ASSAY THYROID STIM HORMONE: CPT

## 2018-09-11 ENCOUNTER — TELEPHONE (OUTPATIENT)
Dept: ENDOCRINOLOGY | Facility: CLINIC | Age: 33
End: 2018-09-11

## 2018-09-11 DIAGNOSIS — E06.3 HYPOTHYROIDISM DUE TO HASHIMOTO'S THYROIDITIS: Primary | ICD-10-CM

## 2018-09-11 DIAGNOSIS — E03.8 HYPOTHYROIDISM DUE TO HASHIMOTO'S THYROIDITIS: Primary | ICD-10-CM

## 2018-09-11 RX ORDER — LEVOTHYROXINE SODIUM 137 MCG
137 TABLET ORAL DAILY
Refills: 0 | Status: CANCELLED | OUTPATIENT
Start: 2018-09-11

## 2018-09-11 NOTE — PROGRESS NOTES
Please call the patient regarding labs - TSH optimal-continue Synthroid at current dose and repeat TSH and free T4 in 2 months

## 2018-09-12 ENCOUNTER — APPOINTMENT (OUTPATIENT)
Dept: LAB | Facility: CLINIC | Age: 33
End: 2018-09-12

## 2018-09-12 ENCOUNTER — TRANSCRIBE ORDERS (OUTPATIENT)
Dept: LAB | Facility: CLINIC | Age: 33
End: 2018-09-12

## 2018-09-12 DIAGNOSIS — Z00.8 HEALTH EXAMINATION IN POPULATION SURVEY: ICD-10-CM

## 2018-09-12 DIAGNOSIS — Z00.8 HEALTH EXAMINATION IN POPULATION SURVEY: Primary | ICD-10-CM

## 2018-09-12 LAB
CHOLEST SERPL-MCNC: 157 MG/DL (ref 50–200)
EST. AVERAGE GLUCOSE BLD GHB EST-MCNC: 103 MG/DL
HBA1C MFR BLD: 5.2 % (ref 4.2–6.3)
HDLC SERPL-MCNC: 55 MG/DL (ref 40–60)
LDLC SERPL CALC-MCNC: 67 MG/DL (ref 0–100)
NONHDLC SERPL-MCNC: 102 MG/DL
TRIGL SERPL-MCNC: 176 MG/DL

## 2018-09-12 PROCEDURE — 36415 COLL VENOUS BLD VENIPUNCTURE: CPT

## 2018-09-12 PROCEDURE — 83036 HEMOGLOBIN GLYCOSYLATED A1C: CPT

## 2018-09-12 PROCEDURE — 80061 LIPID PANEL: CPT

## 2018-09-20 ENCOUNTER — INITIAL PRENATAL (OUTPATIENT)
Dept: OBGYN CLINIC | Facility: CLINIC | Age: 33
End: 2018-09-20

## 2018-09-20 VITALS — BODY MASS INDEX: 32.6 KG/M2 | SYSTOLIC BLOOD PRESSURE: 114 MMHG | DIASTOLIC BLOOD PRESSURE: 80 MMHG | WEIGHT: 214.4 LBS

## 2018-09-20 DIAGNOSIS — Z34.81 PRENATAL CARE, SUBSEQUENT PREGNANCY, FIRST TRIMESTER: Primary | ICD-10-CM

## 2018-09-20 PROCEDURE — OBC: Performed by: PHYSICIAN ASSISTANT

## 2018-09-20 RX ORDER — MULTIVITAMIN WITH IRON
100 TABLET ORAL
COMMUNITY
End: 2018-12-26 | Stop reason: ALTCHOICE

## 2018-09-20 NOTE — PROGRESS NOTES
Pt here for her OB intake  Pregnancy was spontaneous after 3 years of infertility treatment and they are very excited! 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

## 2018-09-26 ENCOUNTER — ROUTINE PRENATAL (OUTPATIENT)
Dept: PERINATAL CARE | Facility: CLINIC | Age: 33
End: 2018-09-26
Payer: COMMERCIAL

## 2018-09-26 ENCOUNTER — OFFICE VISIT (OUTPATIENT)
Dept: PERINATAL CARE | Facility: CLINIC | Age: 33
End: 2018-09-26
Payer: COMMERCIAL

## 2018-09-26 VITALS
DIASTOLIC BLOOD PRESSURE: 86 MMHG | SYSTOLIC BLOOD PRESSURE: 125 MMHG | BODY MASS INDEX: 32.83 KG/M2 | HEART RATE: 102 BPM | HEIGHT: 68 IN | WEIGHT: 216.6 LBS

## 2018-09-26 VITALS
BODY MASS INDEX: 32.83 KG/M2 | SYSTOLIC BLOOD PRESSURE: 125 MMHG | HEART RATE: 102 BPM | HEIGHT: 68 IN | WEIGHT: 216.6 LBS | DIASTOLIC BLOOD PRESSURE: 86 MMHG

## 2018-09-26 DIAGNOSIS — E06.3 HYPOTHYROIDISM DUE TO HASHIMOTO'S THYROIDITIS: ICD-10-CM

## 2018-09-26 DIAGNOSIS — Z34.81 PRENATAL CARE, SUBSEQUENT PREGNANCY, FIRST TRIMESTER: ICD-10-CM

## 2018-09-26 DIAGNOSIS — E03.8 HYPOTHYROIDISM DUE TO HASHIMOTO'S THYROIDITIS: ICD-10-CM

## 2018-09-26 DIAGNOSIS — Z3A.13 13 WEEKS GESTATION OF PREGNANCY: Primary | ICD-10-CM

## 2018-09-26 DIAGNOSIS — O09.521 MATERNAL AGE 35+, MULTIGRAVIDA, FIRST TRIMESTER: Primary | ICD-10-CM

## 2018-09-26 DIAGNOSIS — O09.521 ELDERLY MULTIGRAVIDA IN FIRST TRIMESTER: ICD-10-CM

## 2018-09-26 PROCEDURE — 99212 OFFICE O/P EST SF 10 MIN: CPT | Performed by: OBSTETRICS & GYNECOLOGY

## 2018-09-26 PROCEDURE — 76801 OB US < 14 WKS SINGLE FETUS: CPT | Performed by: OBSTETRICS & GYNECOLOGY

## 2018-09-26 NOTE — PROGRESS NOTES
Genetic Counseling Note        Shannon Garcia     Appointment Date:  9/26/2018  Referred By: Chela Bernal DO  YOB: 1985  Partner:  Naseem Nolasco    Pregnancy History: [id number]  Estimated Date of Delivery: 4/2/19  Estimated Gestational Age: 17 weeks 1 day     Genetic Counseling:advanced maternal age    Peter Larson is a(n) 26 y o  female who is here to discuss maternal age related risk for aneuploidy    Issues Discussed:  Average population risk: 3-4% in the average pregnancy of serious condition or birth defect  2-3% risk of mental retardation  Not all detected by prenatal testing  Chromosomal: non-disjunction 1/178 overall, 1/384 for Down syndrome  Maternal age  Family history of cystic fibrosis    Options Discussed:  Amniocentesis: risks and limitations discussed  CVS: risks and limitations discussed  Ethnic screening discussed: clinical and genetic basis of CF, SMA, Fragile X and expanded carrier screening  Level II ultrasound to screen for structural anomalies  Nuchal translucency/1st trimester serum screen: goals and limitations discussed  Serum AFP screen recommended at 15-17 weeks to check for open neural tube defects  Cell free fetal DNA testing    Additional Information / Impression / Plan / Tests Ordered:  Peter Larson presents for genetic counseling to discuss maternal age related risk for aneuploidy  I reviewed with the patient the options regarding prenatal diagnosis for chromosome abnormalities including CVS and amniocentesis  Chorionic villus sampling(CVS) is generally performed between 10 and 12 weeks of pregnancy and amniocentesis is generally performed at 16 weeks or later  Recent literature supports that CVS and amniocentsis both have an associated  procedure related risk of miscarriage of less than 1/300  Chromosome results from CVS or amniocentesis are greater than 99 9% accurate  Occasionally a repeat procedure may be necessary due to cell culture failure    Approximately 1% of the time, a mosaic chromosome result from CVS will necessitate a followup amniocentesis  Measurement of AFP from amniotic fluid is able to detect approximately 95% of open neural tube defects  Maternal serum AFP is recommended for patients who elect CVS     We also reviewed the availability and limitations of sequential screening, NIPS, and level II ultrasound evaluation  Sequential screening consisting of first trimester measurement of nuchal translucency combined with first trimester biochemical analysis as well as second trimester biochemical analysis is able to detect approximately 90% of pregnancies in which the fetus has Down syndrome, 90% of pregnancies in which the fetus has trisomy 25 and 80% of pregnancies which appears has an open neural tube defect  NIPS involves assessment of fragments of fetal DNA in maternal blood  This test has varying detection rates depending on the laboratory used though the quoted detection rate for Down syndrome is generally greater than 99% and detection rate for trisomy 18 is 98% or better  NIPS has a low false positive rate however consideration of prenatal diagnostic testing is always recommended following a positive NIPS  Level II ultrasound evaluation is able to detect  many major physical birth defects and variations in fetal development that may be associated with chromosome abnormalities  Level II ultrasound evaluation is not able to detect all birth defects or health problems  After discussing the available prenatal diagnostic and screening procedures Alex Pereira elected to decline prenatal diagnostic testing at this time  She did opt to pursue cell free fetal DNA testing  Her insurance requires prior authorization  Our office will initiate the process and contact the patient once that has been obtained  In the meantime the patient was provided with a TRF for [employer]    She is also scheduled to have nuchal translucency ultrasound following the genetic counseling session and is planning to pursue level 2 ultrasound and MSAFP screening at the appropriate times  During our counseling session histories were taken on the patient's family and her 's family  Laila Olvera reports a family history of cystic fibrosis  Specifically, she had a maternal aunt who  6years of age from complications of this condition  Laila Olvera reports that she had carrier screening performed prior to her 1st pregnancy which 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  She further indicated that her  was tested and was screen negative  I was not able to locate a copy of those result in her chart  She indicated she has those result at home and is planning to forward them to me as soon as possible  We did discuss that genetic carrier screening for cystic fibrosis has varying detection rates depending upon the laboratory used and that once I get the results I can calculate her 's reduced carrier risk and the remaining risk for them to have a child with cystic fibrosis  Laila Olvera also reports that she and her  experienced secondary infertility following the birth of their now 3year-old daughter and pursue fertility treatment via IVF  They had a couple of chemical pregnancies and then had the remaining frozen embryos tested via PGD  She indicated that all 3 frozen embryos were determined to be genetically abnormal with one having Klinefelter syndrome and the other two having non-viable trisomies  I explained that this history may increase her risk, over her age-related risk for having a child with a chromosome abnormality  A study published by Alberta rivera in [facility]  evaluated trisomy recurrence as a multiple of a patient's age related risk and a SMR  For patients whose initial trisomy was over the age of 27 the SMR is 1 8 which multiplied by the patient's age related risk of 178 would be approximately 1/99 or just about 1%     I emphasized to the patient that cell free fetal DNA testing does not detect nonviable trisomies  The patient reports being of [place] descent and that her  is of [facility] descent  She denies either of them having any known Denominational ancestry  Nicole Colby reports having had a genetic carrier screening panel performed through her reproductive endocrinologist  The testing she had performed was through [email or web address]  This panel includes SMA and Fragile X among other conditions  Nicole Colby stated that she was screen negative for all other conditions tested except CF  I was unable to locate those records either therefore she is planning to forward them to me as well  Lastly, we discussed the fact that it is important to keep in mind that everyone in the general population regardless of age, family history, or medical background has approximately a 3% risk of having a child with some type of her defect, genetic disease or intellectual disability  Currently there are no tests available to rule out all birth defects or health problems              Time spent with Genetic Counselor: 40 minutes

## 2018-09-26 NOTE — PROGRESS NOTES
An ultrasound for viability, dating was completed today  Nuchal translucency was not measured due to technical limitations  Ms Hermelinda Ramirez received genetic counseling  This pregnancy was spontaneously conceived  See Ob Procedures in EPIC  Hypothyroidism managed by her endocrinologist      Urban Matthews ultrasound findings and suggested follow-up were discussed in detail with the patient  We discussed the option of non invasive prenatal testing (NIPT) using cell free DNA analysis, which has a 99% sensitivity for detection of Down syndrome, with a less than 1% false positive rate  We also discussed that definitive prenatal diagnosis is possible only through genetic amniocentesis or chorionic villus sampling  The patient had a venous blood draw for an NIPT in the [facility]  today  Results will be available in about 10 days  Recommendations;  1  Follow up MSAFP testing is recommended between 16 and 20 weeks gestation  MSAFP testing was not ordered for the patient at the [facility] visit today  2   Level II ultrasound evaluation will be performed at 20 weeks gestation      Jean Pierre Galan MD

## 2018-10-02 ENCOUNTER — APPOINTMENT (OUTPATIENT)
Dept: LAB | Facility: CLINIC | Age: 33
End: 2018-10-02
Payer: COMMERCIAL

## 2018-10-02 DIAGNOSIS — Z34.81 PRENATAL CARE, SUBSEQUENT PREGNANCY, FIRST TRIMESTER: ICD-10-CM

## 2018-10-02 LAB
ABO GROUP BLD: NORMAL
BACTERIA UR QL AUTO: ABNORMAL /HPF
BASOPHILS # BLD AUTO: 0.02 THOUSANDS/ΜL (ref 0–0.1)
BASOPHILS NFR BLD AUTO: 0 % (ref 0–1)
BILIRUB UR QL STRIP: NEGATIVE
BLD GP AB SCN SERPL QL: NEGATIVE
CLARITY UR: ABNORMAL
COLOR UR: ABNORMAL
EOSINOPHIL # BLD AUTO: 0.15 THOUSAND/ΜL (ref 0–0.61)
EOSINOPHIL NFR BLD AUTO: 2 % (ref 0–6)
ERYTHROCYTE [DISTWIDTH] IN BLOOD BY AUTOMATED COUNT: 12.8 % (ref 11.6–15.1)
GLUCOSE UR STRIP-MCNC: NEGATIVE MG/DL
HCT VFR BLD AUTO: 40.1 % (ref 34.8–46.1)
HGB BLD-MCNC: 13.1 G/DL (ref 11.5–15.4)
HGB UR QL STRIP.AUTO: NEGATIVE
HYALINE CASTS #/AREA URNS LPF: ABNORMAL /LPF
IMM GRANULOCYTES # BLD AUTO: 0.02 THOUSAND/UL (ref 0–0.2)
IMM GRANULOCYTES NFR BLD AUTO: 0 % (ref 0–2)
KETONES UR STRIP-MCNC: NEGATIVE MG/DL
LEUKOCYTE ESTERASE UR QL STRIP: NEGATIVE
LYMPHOCYTES # BLD AUTO: 1.85 THOUSANDS/ΜL (ref 0.6–4.47)
LYMPHOCYTES NFR BLD AUTO: 23 % (ref 14–44)
MCH RBC QN AUTO: 28.6 PG (ref 26.8–34.3)
MCHC RBC AUTO-ENTMCNC: 32.7 G/DL (ref 31.4–37.4)
MCV RBC AUTO: 88 FL (ref 82–98)
MONOCYTES # BLD AUTO: 0.42 THOUSAND/ΜL (ref 0.17–1.22)
MONOCYTES NFR BLD AUTO: 5 % (ref 4–12)
NEUTROPHILS # BLD AUTO: 5.7 THOUSANDS/ΜL (ref 1.85–7.62)
NEUTS SEG NFR BLD AUTO: 70 % (ref 43–75)
NITRITE UR QL STRIP: NEGATIVE
NON-SQ EPI CELLS URNS QL MICRO: ABNORMAL /HPF
NRBC BLD AUTO-RTO: 0 /100 WBCS
PH UR STRIP.AUTO: 6.5 [PH] (ref 4.5–8)
PLATELET # BLD AUTO: 207 THOUSANDS/UL (ref 149–390)
PMV BLD AUTO: 10.6 FL (ref 8.9–12.7)
PROT UR STRIP-MCNC: NEGATIVE MG/DL
RBC # BLD AUTO: 4.58 MILLION/UL (ref 3.81–5.12)
RBC #/AREA URNS AUTO: ABNORMAL /HPF
RH BLD: NEGATIVE
RUBV IGG SERPL IA-ACNC: 30.5 IU/ML
SP GR UR STRIP.AUTO: 1.02 (ref 1–1.03)
SPECIMEN EXPIRATION DATE: NORMAL
UROBILINOGEN UR QL STRIP.AUTO: 1 E.U./DL
WBC # BLD AUTO: 8.16 THOUSAND/UL (ref 4.31–10.16)
WBC #/AREA URNS AUTO: ABNORMAL /HPF

## 2018-10-02 PROCEDURE — 36415 COLL VENOUS BLD VENIPUNCTURE: CPT

## 2018-10-02 PROCEDURE — 87147 CULTURE TYPE IMMUNOLOGIC: CPT

## 2018-10-02 PROCEDURE — 87086 URINE CULTURE/COLONY COUNT: CPT

## 2018-10-02 PROCEDURE — 80081 OBSTETRIC PANEL INC HIV TSTG: CPT

## 2018-10-02 PROCEDURE — 81001 URINALYSIS AUTO W/SCOPE: CPT

## 2018-10-03 ENCOUNTER — ROUTINE PRENATAL (OUTPATIENT)
Dept: OBGYN CLINIC | Facility: CLINIC | Age: 33
End: 2018-10-03

## 2018-10-03 VITALS — SYSTOLIC BLOOD PRESSURE: 126 MMHG | BODY MASS INDEX: 32.99 KG/M2 | WEIGHT: 217 LBS | DIASTOLIC BLOOD PRESSURE: 78 MMHG

## 2018-10-03 DIAGNOSIS — E06.3 HYPOTHYROIDISM DUE TO HASHIMOTO'S THYROIDITIS: ICD-10-CM

## 2018-10-03 DIAGNOSIS — E03.8 HYPOTHYROIDISM DUE TO HASHIMOTO'S THYROIDITIS: ICD-10-CM

## 2018-10-03 DIAGNOSIS — Z98.891 HISTORY OF C-SECTION: ICD-10-CM

## 2018-10-03 DIAGNOSIS — Z67.91 RH NEGATIVE STATUS DURING PREGNANCY IN SECOND TRIMESTER: ICD-10-CM

## 2018-10-03 DIAGNOSIS — O26.892 RH NEGATIVE STATUS DURING PREGNANCY IN SECOND TRIMESTER: ICD-10-CM

## 2018-10-03 DIAGNOSIS — O09.522 ADVANCED MATERNAL AGE IN MULTIGRAVIDA, SECOND TRIMESTER: ICD-10-CM

## 2018-10-03 DIAGNOSIS — Z34.90 PREGNANCY, UNSPECIFIED GESTATIONAL AGE: Primary | ICD-10-CM

## 2018-10-03 PROBLEM — N97.9 FEMALE INFERTILITY, SECONDARY: Status: RESOLVED | Noted: 2018-03-15 | Resolved: 2018-10-03

## 2018-10-03 PROBLEM — N91.2 AMENORRHEA: Status: RESOLVED | Noted: 2018-09-06 | Resolved: 2018-10-03

## 2018-10-03 LAB
BACTERIA UR CULT: ABNORMAL
BACTERIA UR CULT: ABNORMAL
HBV SURFACE AG SER QL: NORMAL
RPR SER QL: NORMAL

## 2018-10-03 PROCEDURE — 87491 CHLMYD TRACH DNA AMP PROBE: CPT | Performed by: PHYSICIAN ASSISTANT

## 2018-10-03 PROCEDURE — G0145 SCR C/V CYTO,THINLAYER,RESCR: HCPCS | Performed by: PHYSICIAN ASSISTANT

## 2018-10-03 PROCEDURE — 87624 HPV HI-RISK TYP POOLED RSLT: CPT | Performed by: PHYSICIAN ASSISTANT

## 2018-10-03 PROCEDURE — 87591 N.GONORRHOEAE DNA AMP PROB: CPT | Performed by: PHYSICIAN ASSISTANT

## 2018-10-03 PROCEDURE — PNV: Performed by: PHYSICIAN ASSISTANT

## 2018-10-03 NOTE — ASSESSMENT & PLAN NOTE
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

## 2018-10-03 NOTE — PROGRESS NOTES
Problem List Items Addressed This Visit     Hypothyroidism due to Hashimoto's thyroiditis     On Synthroid 137mcg po daily  TSH 9/10/18 = 0 918  Will repeat q trimester         Rh negative, maternal     For RhoGam at 28 weeks  Aware to report any vaginal bleeding         Advanced maternal age in multigravida, second trimester     Feels well overall  No complaints  Had first OB labs yesterday - all normal with HIV and urine culture pending  Pt is A negative, believes  is positive blood type and she received RhoGam last pregnancy   Pap, cultures today    is a pulmonologist/critical care physician in the network  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

## 2018-10-03 NOTE — ASSESSMENT & PLAN NOTE
States that at the time of her  a uterine septum was found and repaired at a later time  Hoping for a TOLAC

## 2018-10-04 LAB
CHLAMYDIA DNA CVX QL NAA+PROBE: NORMAL
HIV 1+2 AB+HIV1 P24 AG SERPL QL IA: NORMAL
N GONORRHOEA DNA GENITAL QL NAA+PROBE: NORMAL

## 2018-10-08 ENCOUNTER — TELEPHONE (OUTPATIENT)
Dept: OBGYN CLINIC | Facility: CLINIC | Age: 33
End: 2018-10-08

## 2018-10-08 LAB
HPV HR 12 DNA CVX QL NAA+PROBE: NEGATIVE
HPV16 DNA CVX QL NAA+PROBE: NEGATIVE
HPV18 DNA CVX QL NAA+PROBE: NEGATIVE
LAB AP GYN PRIMARY INTERPRETATION: NORMAL
Lab: NORMAL

## 2018-10-08 NOTE — TELEPHONE ENCOUNTER
----- Message from Shantell Blue sent at 10/8/2018 11:41 AM EDT -----  Regarding: Visit Follow-Up Question  Contact: [phone]  Hi vicki,    I'm still struggling with a lot of heartburn, dyspepsia, belching  Has really reduced my appetite  Is it safe to take ranitidine twice a day? I can get it otc if okay by you, thanks!     Shantell Blue

## 2018-10-10 ENCOUNTER — TELEPHONE (OUTPATIENT)
Dept: PERINATAL CARE | Facility: CLINIC | Age: 33
End: 2018-10-10

## 2018-10-10 DIAGNOSIS — E03.8 HYPOTHYROIDISM DUE TO HASHIMOTO'S THYROIDITIS: ICD-10-CM

## 2018-10-10 DIAGNOSIS — E06.3 HYPOTHYROIDISM DUE TO HASHIMOTO'S THYROIDITIS: ICD-10-CM

## 2018-10-11 ENCOUNTER — TELEPHONE (OUTPATIENT)
Dept: PERINATAL CARE | Facility: CLINIC | Age: 33
End: 2018-10-11

## 2018-10-15 LAB
# FETUSES US: 1
CFDNA.FET/TOTAL PLAS.CFDNA: NORMAL
FET CHR 13 TS PLAS.CFDNA QL: NEGATIVE
FET CHR 18 TS PLAS.CFDNA QL: NEGATIVE
FET CHR 21 TS PLAS.CFDNA QL: NEGATIVE
FET CHR X + Y ANEUP PLAS.CFDNA QL: NORMAL
FET CHROM X + Y ANEUP CFDNA IMP: NORMAL
FET Y CHROM PLAS.CFDNA QL: DETECTED
FET Y CHROM PLAS.CFDNA: NORMAL
GA (DAYS): 2 D
GA (WEEKS): 15 WK
MICRODELETION SYND BLD/T FISH: NORMAL
REF LAB TEST METHOD: NORMAL
SERVICE CMNT-IMP: NORMAL
SERVICE CMNT-IMP: NORMAL
SL AMB ABNORMAL MSS?: NORMAL
SL AMB ABNORMAL US?: NORMAL
SL AMB ADVANCED MATERNAL AGE?: YES
SL AMB MICRODELETION INTERP: NORMAL
SL AMB MICRODELETION: NOT DETECTED
SL AMB PERSONAL/FAM HISTORY?: NORMAL
SL AMB SPECIFICATIONS: NORMAL

## 2018-10-16 ENCOUNTER — TELEPHONE (OUTPATIENT)
Dept: PERINATAL CARE | Facility: CLINIC | Age: 33
End: 2018-10-16

## 2018-10-16 NOTE — TELEPHONE ENCOUNTER
----- Message from Michelle Huitron MD sent at 10/16/2018  8:20 AM EDT -----  I have reviewed the results which are normal   Please call patient and notify her of normal results  Thank you

## 2018-10-17 DIAGNOSIS — E06.3 HYPOTHYROIDISM DUE TO HASHIMOTO'S THYROIDITIS: Primary | ICD-10-CM

## 2018-10-17 DIAGNOSIS — E03.8 HYPOTHYROIDISM DUE TO HASHIMOTO'S THYROIDITIS: Primary | ICD-10-CM

## 2018-10-17 RX ORDER — LEVOTHYROXINE SODIUM 137 MCG
137 TABLET ORAL DAILY
Qty: 34 TABLET | Refills: 3 | Status: SHIPPED | OUTPATIENT
Start: 2018-10-17 | End: 2019-01-23 | Stop reason: SDUPTHER

## 2018-11-01 ENCOUNTER — ROUTINE PRENATAL (OUTPATIENT)
Dept: OBGYN CLINIC | Facility: CLINIC | Age: 33
End: 2018-11-01
Payer: COMMERCIAL

## 2018-11-01 VITALS — BODY MASS INDEX: 33.36 KG/M2 | WEIGHT: 219.4 LBS | SYSTOLIC BLOOD PRESSURE: 120 MMHG | DIASTOLIC BLOOD PRESSURE: 80 MMHG

## 2018-11-01 DIAGNOSIS — O09.522 ADVANCED MATERNAL AGE IN MULTIGRAVIDA, SECOND TRIMESTER: ICD-10-CM

## 2018-11-01 DIAGNOSIS — Z98.891 HISTORY OF C-SECTION: ICD-10-CM

## 2018-11-01 DIAGNOSIS — Z23 NEED FOR INFLUENZA VACCINATION: Primary | ICD-10-CM

## 2018-11-01 PROCEDURE — 90471 IMMUNIZATION ADMIN: CPT | Performed by: PHYSICIAN ASSISTANT

## 2018-11-01 PROCEDURE — PNV: Performed by: PHYSICIAN ASSISTANT

## 2018-11-01 PROCEDURE — 90686 IIV4 VACC NO PRSV 0.5 ML IM: CPT | Performed by: PHYSICIAN ASSISTANT

## 2018-11-01 NOTE — PROGRESS NOTES
Patient w/o complaints  (+) some early fetal movement, denies any abdominal pain or bleeding  Problem List Items Addressed This Visit     Advanced maternal age in multigravida, second trimester     RTO 4 wks  For level 2  Flu shot given today  Reviewed reasons to call         History of      Still unsure if wants TOLAC or R C/S  Discussed risks of TOLAC and R C/S  Reason for C/S was breech and 5 yrs ago, will get operative note to us    Leaning towards TOLAC           Other Visit Diagnoses     Need for influenza vaccination    -  Primary    Relevant Orders    influenza vaccine, [id number], quadrivalent, 0 5 mL, preservative-free (SYRINGE, SINGLE-DOSE VIAL), for adult and pediatric patients 3 yr+ (AFLURIA, FLUARIX, FLULAVAL, FLUZONE) (Completed)

## 2018-11-01 NOTE — ASSESSMENT & PLAN NOTE
Still unsure if wants TOLAC or R C/S  Discussed risks of TOLAC and R C/S  Reason for C/S was breech and 5 yrs ago, will get operative note to us    Leaning towards TOLAC

## 2018-11-19 ENCOUNTER — ROUTINE PRENATAL (OUTPATIENT)
Dept: PERINATAL CARE | Facility: CLINIC | Age: 33
End: 2018-11-19
Payer: COMMERCIAL

## 2018-11-19 VITALS
DIASTOLIC BLOOD PRESSURE: 79 MMHG | SYSTOLIC BLOOD PRESSURE: 118 MMHG | WEIGHT: 224 LBS | BODY MASS INDEX: 33.95 KG/M2 | HEART RATE: 109 BPM | HEIGHT: 68 IN

## 2018-11-19 DIAGNOSIS — Z3A.20 20 WEEKS GESTATION OF PREGNANCY: ICD-10-CM

## 2018-11-19 DIAGNOSIS — E03.9 HYPOTHYROIDISM AFFECTING PREGNANCY IN SECOND TRIMESTER: ICD-10-CM

## 2018-11-19 DIAGNOSIS — Z36.86 ENCOUNTER FOR ANTENATAL SCREENING FOR CERVICAL LENGTH: ICD-10-CM

## 2018-11-19 DIAGNOSIS — O99.282 HYPOTHYROIDISM AFFECTING PREGNANCY IN SECOND TRIMESTER: ICD-10-CM

## 2018-11-19 DIAGNOSIS — O09.522 ADVANCED MATERNAL AGE IN MULTIGRAVIDA, SECOND TRIMESTER: Primary | ICD-10-CM

## 2018-11-19 PROCEDURE — 76811 OB US DETAILED SNGL FETUS: CPT | Performed by: OBSTETRICS & GYNECOLOGY

## 2018-11-19 PROCEDURE — 99212 OFFICE O/P EST SF 10 MIN: CPT | Performed by: OBSTETRICS & GYNECOLOGY

## 2018-11-19 PROCEDURE — 76817 TRANSVAGINAL US OBSTETRIC: CPT | Performed by: OBSTETRICS & GYNECOLOGY

## 2018-11-19 NOTE — PROGRESS NOTES
Please refer to the [facility] ultrasound report in Ob Procedures for additional information regarding the visit to the [facility]  today

## 2018-11-19 NOTE — LETTER
November 19, 2018     Irene Mclaughlin MD  [address] [place] [id number]    Patient: Violetta Kirk   YOB: 1985   Date of Visit: 11/19/2018       Dear Dr Nydia James: Thank you for referring Violetta Kirk to me for evaluation  Below are my notes for this consultation  If you have questions, please do not hesitate to call me  I look forward to following your patient along with you  Sincerely,        Juan Manuel Gallo MD        CC: No Recipients  Juan Manuel Gallo MD  11/19/2018  2:40 PM  Sign at close encounter  Please refer to the [facility] ultrasound report in Ob Procedures for additional information regarding the visit to the [facility]  today

## 2018-11-20 ENCOUNTER — LAB (OUTPATIENT)
Dept: LAB | Facility: CLINIC | Age: 33
End: 2018-11-20
Payer: COMMERCIAL

## 2018-11-20 DIAGNOSIS — Z34.81 PRENATAL CARE, SUBSEQUENT PREGNANCY, FIRST TRIMESTER: ICD-10-CM

## 2018-11-20 DIAGNOSIS — E03.8 HYPOTHYROIDISM DUE TO HASHIMOTO'S THYROIDITIS: ICD-10-CM

## 2018-11-20 DIAGNOSIS — E06.3 HYPOTHYROIDISM DUE TO HASHIMOTO'S THYROIDITIS: ICD-10-CM

## 2018-11-20 LAB
T4 FREE SERPL-MCNC: 0.98 NG/DL (ref 0.76–1.46)
TSH SERPL DL<=0.05 MIU/L-ACNC: 0.51 UIU/ML (ref 0.36–3.74)

## 2018-11-20 PROCEDURE — 84439 ASSAY OF FREE THYROXINE: CPT

## 2018-11-20 PROCEDURE — 36415 COLL VENOUS BLD VENIPUNCTURE: CPT

## 2018-11-20 PROCEDURE — 84443 ASSAY THYROID STIM HORMONE: CPT

## 2018-11-21 ENCOUNTER — TELEPHONE (OUTPATIENT)
Dept: ENDOCRINOLOGY | Facility: CLINIC | Age: 33
End: 2018-11-21

## 2018-11-21 NOTE — TELEPHONE ENCOUNTER
----- Message from Smith To MD sent at 11/20/2018  9:04 PM EST -----  Please call the patient regarding labs - tsh ok- continue synthroid at current dose

## 2018-11-30 ENCOUNTER — ROUTINE PRENATAL (OUTPATIENT)
Dept: OBGYN CLINIC | Facility: CLINIC | Age: 33
End: 2018-11-30

## 2018-11-30 VITALS — SYSTOLIC BLOOD PRESSURE: 122 MMHG | BODY MASS INDEX: 34.79 KG/M2 | WEIGHT: 228.8 LBS | DIASTOLIC BLOOD PRESSURE: 80 MMHG

## 2018-11-30 DIAGNOSIS — Z67.91 RH NEGATIVE STATUS DURING PREGNANCY IN SECOND TRIMESTER: ICD-10-CM

## 2018-11-30 DIAGNOSIS — O26.892 RH NEGATIVE STATUS DURING PREGNANCY IN SECOND TRIMESTER: ICD-10-CM

## 2018-11-30 DIAGNOSIS — Z34.92 ENCOUNTER FOR SUPERVISION OF NORMAL PREGNANCY IN SECOND TRIMESTER, UNSPECIFIED GRAVIDITY: Primary | ICD-10-CM

## 2018-11-30 DIAGNOSIS — O99.282 HYPOTHYROIDISM DURING PREGNANCY IN SECOND TRIMESTER: ICD-10-CM

## 2018-11-30 DIAGNOSIS — E03.9 HYPOTHYROIDISM DURING PREGNANCY IN SECOND TRIMESTER: ICD-10-CM

## 2018-11-30 DIAGNOSIS — O09.522 ADVANCED MATERNAL AGE IN MULTIGRAVIDA, SECOND TRIMESTER: ICD-10-CM

## 2018-11-30 DIAGNOSIS — O44.40 LOW-LYING PLACENTA: ICD-10-CM

## 2018-11-30 DIAGNOSIS — Z87.42 HISTORY OF INFERTILITY: ICD-10-CM

## 2018-11-30 DIAGNOSIS — Z98.891 HISTORY OF C-SECTION: ICD-10-CM

## 2018-11-30 PROBLEM — R82.71 GBS BACTERIURIA: Status: ACTIVE | Noted: 2018-11-30

## 2018-11-30 PROCEDURE — PNV: Performed by: NURSE PRACTITIONER

## 2018-11-30 NOTE — ASSESSMENT & PLAN NOTE
Denies OB complaints  Good fetal movement  Denies contractions, cramping, leakage of fluid or vaginal bleeding  Baby boy  S/p flu vaccine  Reviewed reasons to call

## 2018-11-30 NOTE — PROGRESS NOTES
Problem List Items Addressed This Visit     Rh negative, maternal     The patient is aware of plan for Rhogam at 28wks  Advanced maternal age in multigravida, second trimester     Neg cffDNA  History of      Pt desires TOLAC  Records release was signed today for op report from prior delivery  Encounter for supervision of normal pregnancy in second trimester - Primary     Denies OB complaints  Good fetal movement  Denies contractions, cramping, leakage of fluid or vaginal bleeding  Baby boy  S/p flu vaccine  Reviewed reasons to call  Low-lying placenta     Placenta noted to be 15mm from os on L2  Discussed LLP at length  32 week US scheduled for placenta check and growth  Hypothyroidism during pregnancy in second trimester     18 TSH 0 5  Continue current dose of Synthroid and recheck TSH q trimester  History of infertility     Current preg was conceived spontaneously  Prior h/o IVF, removal of uterine septum

## 2018-12-06 ENCOUNTER — TELEPHONE (OUTPATIENT)
Dept: OBGYN CLINIC | Facility: CLINIC | Age: 33
End: 2018-12-06

## 2018-12-06 NOTE — TELEPHONE ENCOUNTER
Gave information to Dr Mario Vnicent - he stated it is round ligament pain  Take tylenol PRN and follow up as scheduled  Called pt back and advised her of this  She understands  Her next appointment is 12/26 - will keep it

## 2018-12-06 NOTE — TELEPHONE ENCOUNTER
Spoke with pt - she is 23w2d,   She was last seen  by Rosalie Fountain - she states she mentioned at that appointment that she had a sore tender spot on right mid abdomen area  Hurts to push on it  No blemishes and no lumps  States when she is active is when it hurts the most  No bleeding or leakage of fluid  No other signs of cramping, contractions or heart palpitations  Good fetal movement  Keeping hydrated  Patient is worried  Please advise  Thanks!

## 2018-12-06 NOTE — TELEPHONE ENCOUNTER
----- Message from Rennie Boxer sent at 12/6/2018  9:54 AM EST -----  Regarding: Non-Urgent Medical Question  Contact: [phone]  Christopher López, I'm having persistent aching pain/discomfort in that spot on my abdomen I mentioned last week  It's 2inches to the right of my mid-abdomen, seems to be right at the top of my uterus  I can push in the exact spot that hurts with a finger  Hurts worse when I'm active  Just bothering me to not know what it is  Any thoughts? Could I have a hernia in that spot?    Thanks

## 2018-12-10 ENCOUNTER — TELEPHONE (OUTPATIENT)
Dept: OBGYN CLINIC | Facility: CLINIC | Age: 33
End: 2018-12-10

## 2018-12-19 ENCOUNTER — TELEPHONE (OUTPATIENT)
Dept: OBGYN CLINIC | Facility: CLINIC | Age: 33
End: 2018-12-19

## 2018-12-19 NOTE — TELEPHONE ENCOUNTER
----- Message from Tera Galicia sent at 12/19/2018  6:27 AM EST -----  Regarding: Non-Urgent Medical Question  Contact: [phone]  Hello, in preparation for my visit 12/26 is like to confirm your office has my c section operative report from 12/31/13  When I checked out from my last visit I was told you had an "OP note" dated 12/30/13, which would have been an outpatient visit and quick ultrasound the day before my surgery  But I need the doctor to be able to review my surgical note to discuss my options for delivery with this pregnancy  Please let me know, thank you      Tera Galicia

## 2018-12-19 NOTE — TELEPHONE ENCOUNTER
Please have an OB nurse check on this -  Looks like I saw her and we had her complete a records release on 11/30 for the op report  Under the media tab of her chart there are scanned documents dated 12/13/18 which were signed off by Azar Hoyos which appear to be office visit notes  I dont see an op report anywhere in media - could you please double check this so I'm sure I didn't just miss something? And if we indeed do not have an op report please contact her prior provider or the hospital for the records  Must be an op report, not just provider notes stating she had a C/s     Please let the patient know we are still working on it    Thanks

## 2018-12-26 ENCOUNTER — ROUTINE PRENATAL (OUTPATIENT)
Dept: OBGYN CLINIC | Facility: CLINIC | Age: 33
End: 2018-12-26

## 2018-12-26 VITALS — DIASTOLIC BLOOD PRESSURE: 72 MMHG | BODY MASS INDEX: 34.36 KG/M2 | WEIGHT: 226 LBS | SYSTOLIC BLOOD PRESSURE: 116 MMHG

## 2018-12-26 DIAGNOSIS — Z67.91 RH NEGATIVE STATUS DURING PREGNANCY IN SECOND TRIMESTER: ICD-10-CM

## 2018-12-26 DIAGNOSIS — O09.522 ADVANCED MATERNAL AGE IN MULTIGRAVIDA, SECOND TRIMESTER: Primary | ICD-10-CM

## 2018-12-26 DIAGNOSIS — O44.40 LOW-LYING PLACENTA: ICD-10-CM

## 2018-12-26 DIAGNOSIS — O26.892 RH NEGATIVE STATUS DURING PREGNANCY IN SECOND TRIMESTER: ICD-10-CM

## 2018-12-26 DIAGNOSIS — Z98.891 HISTORY OF C-SECTION: ICD-10-CM

## 2018-12-26 DIAGNOSIS — O99.282 HYPOTHYROIDISM DURING PREGNANCY IN SECOND TRIMESTER: ICD-10-CM

## 2018-12-26 DIAGNOSIS — E03.9 HYPOTHYROIDISM DURING PREGNANCY IN SECOND TRIMESTER: ICD-10-CM

## 2018-12-26 DIAGNOSIS — Z3A.26 26 WEEKS GESTATION OF PREGNANCY: ICD-10-CM

## 2018-12-26 PROCEDURE — PNV: Performed by: OBSTETRICS & GYNECOLOGY

## 2018-12-26 NOTE — ASSESSMENT & PLAN NOTE
Operative report reviewed from [address] [place] [address] in [facility] with 2-layer closure  Discussed that patient is good candidate for TOLAC in terms of non-recurring indication for original  (breech)  Patient was diagnosed with small septum during infertility work-up which was resected  Discussed risks of TOLAC including <1% risk of uterine rupture with small percentage of women/babies who would have significant sequelae including hypoxia/anoxia, hemorrhage, hysterectomy  Discussed risks of repeat  including risks of hemorrhage, injury to bladder/bowel  Patient will discuss options with her  and continue discussion at next visit

## 2018-12-26 NOTE — PROGRESS NOTES
28 week labs given  Discussed TOLAC  See below  Problem List Items Addressed This Visit     Rh negative, maternal     Has appointment on  for Rhogam           Advanced maternal age in multigravida, second trimester     26-28 week labs given  Patient will go soon  History of      Operative report reviewed from [address] [place] [address] in [facility] with 2-layer closure  Discussed that patient is good candidate for TOLAC in terms of non-recurring indication for original  (breech)  Patient was diagnosed with small septum during infertility work-up which was resected  Discussed risks of TOLAC including <1% risk of uterine rupture with small percentage of women/babies who would have significant sequelae including hypoxia/anoxia, hemorrhage, hysterectomy  Discussed risks of repeat  including risks of hemorrhage, injury to bladder/bowel  Patient will discuss options with her  and continue discussion at next visit  Low-lying placenta     Repeat ultrasound for placental location scheduled at 32 weeks         Hypothyroidism during pregnancy in second trimester     Last TSH normal in November  Will repeat at 32 weeks to ensure normal in third trimester              Other Visit Diagnoses     26 weeks gestation of pregnancy    -  Primary    Relevant Orders    CBC    Glucose, 1H PG    RPR

## 2018-12-27 ENCOUNTER — TELEPHONE (OUTPATIENT)
Dept: OBGYN CLINIC | Facility: CLINIC | Age: 33
End: 2018-12-27

## 2018-12-31 NOTE — TELEPHONE ENCOUNTER
Her former Ob/Gyn should have access to this and should be able to send it   We should probably reach out to the practice manager there and explain the situation  If we can't get our hands on this document her care will be significantly impacted   Thanks

## 2018-12-31 NOTE — TELEPHONE ENCOUNTER
Called patients doctors office they informed me we need to contact hospital for the op report  Faxed medical release that we currently have to hospital   I have tried calling the [facility] but always put on hold can never talk to anyone   Will check on status of report periodically

## 2019-01-03 ENCOUNTER — APPOINTMENT (OUTPATIENT)
Dept: LAB | Facility: CLINIC | Age: 34
End: 2019-01-03
Payer: COMMERCIAL

## 2019-01-03 DIAGNOSIS — Z3A.26 26 WEEKS GESTATION OF PREGNANCY: ICD-10-CM

## 2019-01-03 LAB
ERYTHROCYTE [DISTWIDTH] IN BLOOD BY AUTOMATED COUNT: 12.8 % (ref 11.6–15.1)
GLUCOSE 1H P 50 G GLC PO SERPL-MCNC: 99 MG/DL
HCT VFR BLD AUTO: 36.9 % (ref 34.8–46.1)
HGB BLD-MCNC: 11.9 G/DL (ref 11.5–15.4)
MCH RBC QN AUTO: 28.7 PG (ref 26.8–34.3)
MCHC RBC AUTO-ENTMCNC: 32.2 G/DL (ref 31.4–37.4)
MCV RBC AUTO: 89 FL (ref 82–98)
PLATELET # BLD AUTO: 206 THOUSANDS/UL (ref 149–390)
PMV BLD AUTO: 10.5 FL (ref 8.9–12.7)
RBC # BLD AUTO: 4.14 MILLION/UL (ref 3.81–5.12)
WBC # BLD AUTO: 8.24 THOUSAND/UL (ref 4.31–10.16)

## 2019-01-03 PROCEDURE — 86592 SYPHILIS TEST NON-TREP QUAL: CPT

## 2019-01-03 PROCEDURE — 82950 GLUCOSE TEST: CPT

## 2019-01-03 PROCEDURE — 85027 COMPLETE CBC AUTOMATED: CPT

## 2019-01-03 PROCEDURE — 36415 COLL VENOUS BLD VENIPUNCTURE: CPT

## 2019-01-04 LAB — RPR SER QL: NORMAL

## 2019-01-09 ENCOUNTER — ROUTINE PRENATAL (OUTPATIENT)
Dept: OBGYN CLINIC | Facility: CLINIC | Age: 34
End: 2019-01-09
Payer: COMMERCIAL

## 2019-01-09 VITALS — BODY MASS INDEX: 34.82 KG/M2 | DIASTOLIC BLOOD PRESSURE: 70 MMHG | WEIGHT: 229 LBS | SYSTOLIC BLOOD PRESSURE: 116 MMHG

## 2019-01-09 DIAGNOSIS — O26.893 RH NEGATIVE STATUS DURING PREGNANCY IN THIRD TRIMESTER: ICD-10-CM

## 2019-01-09 DIAGNOSIS — Z98.891 HISTORY OF C-SECTION: ICD-10-CM

## 2019-01-09 DIAGNOSIS — Z67.91 RH NEGATIVE STATUS DURING PREGNANCY IN THIRD TRIMESTER: ICD-10-CM

## 2019-01-09 DIAGNOSIS — O44.40 LOW-LYING PLACENTA: ICD-10-CM

## 2019-01-09 DIAGNOSIS — Z34.93 ENCOUNTER FOR SUPERVISION OF NORMAL PREGNANCY IN THIRD TRIMESTER, UNSPECIFIED GRAVIDITY: Primary | ICD-10-CM

## 2019-01-09 PROCEDURE — 96372 THER/PROPH/DIAG INJ SC/IM: CPT | Performed by: OBSTETRICS & GYNECOLOGY

## 2019-01-09 PROCEDURE — 90384 RH IG FULL-DOSE IM: CPT | Performed by: OBSTETRICS & GYNECOLOGY

## 2019-01-09 PROCEDURE — PNV: Performed by: OBSTETRICS & GYNECOLOGY

## 2019-01-09 NOTE — PROGRESS NOTES
28 week labs normal  Patient A negative- Rhogam given today  Patient would like Tdap next visit  Baby moving well  NDC# [id number], Lot # [id number], Exp: 6/29/2020  Given by:  Myrna BANSAL

## 2019-01-09 NOTE — PROGRESS NOTES
Obed Jacob is doing well  Rhogam today, TDAP next visit  28wk labs WNL  Discussed kick counts  Plans on TOLAC if spontaneous labor, if not delivered by [facility] will likely schedule RLTCS  Problem List Items Addressed This Visit        Other    Rh negative, maternal - Primary    Relevant Orders    Rhogam Immune Globulin Immunization (Completed)    Low-lying placenta     Has 32 week US scheduled for follow up of placental location

## 2019-01-23 ENCOUNTER — OFFICE VISIT (OUTPATIENT)
Dept: POSTPARTUM | Facility: CLINIC | Age: 34
End: 2019-01-23
Payer: COMMERCIAL

## 2019-01-23 ENCOUNTER — OFFICE VISIT (OUTPATIENT)
Dept: ENDOCRINOLOGY | Facility: CLINIC | Age: 34
End: 2019-01-23
Payer: COMMERCIAL

## 2019-01-23 VITALS
HEIGHT: 68 IN | WEIGHT: 237 LBS | DIASTOLIC BLOOD PRESSURE: 82 MMHG | HEART RATE: 100 BPM | BODY MASS INDEX: 35.92 KG/M2 | SYSTOLIC BLOOD PRESSURE: 130 MMHG

## 2019-01-23 VITALS — SYSTOLIC BLOOD PRESSURE: 110 MMHG | DIASTOLIC BLOOD PRESSURE: 82 MMHG

## 2019-01-23 DIAGNOSIS — Z71.89 ENCOUNTER FOR BREAST FEEDING COUNSELING: Primary | ICD-10-CM

## 2019-01-23 DIAGNOSIS — E04.2 MULTIPLE THYROID NODULES: ICD-10-CM

## 2019-01-23 DIAGNOSIS — E03.8 HYPOTHYROIDISM DUE TO HASHIMOTO'S THYROIDITIS: Primary | ICD-10-CM

## 2019-01-23 DIAGNOSIS — O99.283 HYPOTHYROIDISM DURING PREGNANCY IN THIRD TRIMESTER: ICD-10-CM

## 2019-01-23 DIAGNOSIS — E55.9 VITAMIN D DEFICIENCY: ICD-10-CM

## 2019-01-23 DIAGNOSIS — E03.9 HYPOTHYROIDISM DURING PREGNANCY IN THIRD TRIMESTER: ICD-10-CM

## 2019-01-23 DIAGNOSIS — E06.3 HYPOTHYROIDISM DUE TO HASHIMOTO'S THYROIDITIS: Primary | ICD-10-CM

## 2019-01-23 PROCEDURE — 99404 PREV MED CNSL INDIV APPRX 60: CPT | Performed by: PEDIATRICS

## 2019-01-23 PROCEDURE — 99214 OFFICE O/P EST MOD 30 MIN: CPT | Performed by: PHYSICIAN ASSISTANT

## 2019-01-23 RX ORDER — LEVOTHYROXINE SODIUM 137 MCG
TABLET ORAL
Qty: 102 TABLET | Refills: 3 | Status: SHIPPED | OUTPATIENT
Start: 2019-01-23 | End: 2019-02-18 | Stop reason: SDUPTHER

## 2019-01-23 RX ORDER — DOCUSATE SODIUM 100 MG/1
100 CAPSULE, LIQUID FILLED ORAL DAILY
COMMUNITY
End: 2019-05-02 | Stop reason: ALTCHOICE

## 2019-01-23 NOTE — PROGRESS NOTES
INITIAL BREAST FEEDING EVALUATION    Informant/Relationship: Simon Phillip    Discussion of General Lactation Issues: Simon Phillip is pregnant with her second child  Her breastfeeding attempts with her first child did not go well and after 2 weeks she gave up  She produced colostrum early and baby did well for about 4 days and then growth was not reassuring  Supplementation began at that time  She felt "devastated" and wants to try again with this child  She has a history of breast reduction in   She feels a pedicule procedure rather than a free nipple graft procedure was done  She went from a DD cup to a B cup  Since then her cup size has gradually increased to DD again   History:  Fertility Problem:yes - Saw specialist   Unofficial dx was chronic endometritis and treated with doxycycline  Pregnacy acheived after tx  Breast changes:yes - Early tenderness and heaviness of the breasts  Areola have gotten larger and darker  She is currently 30 weeks        Past Medical History:   Diagnosis Date    Abnormal Pap smear of cervix     Female infertility     3 years of infertilty treatments; this is a spontaneous pregnancy    H/O Hashimoto thyroiditis     HPV (human papilloma virus) infection     In vitro fertilization     Migraine     PVC (premature ventricular contraction)     daily had them intermittently    Status post hysteroscopic surgical removal of uterine septum     Status post resection of uterine septum     Thyroid nodule     Varicella     childhood    Vitiligo          Current Outpatient Prescriptions:     Cholecalciferol (VITAMIN D3) 2000 units capsule, Take 2,000 Units by mouth daily  , Disp: , Rfl:     PRENATAL MULTIVIT-MIN-FE-FA PO, Take 1 tablet by mouth, Disp: , Rfl:     SYNTHROID 137 MCG tablet, Take 1 tablet (137 mcg total) by mouth daily Take one tab Mon-Sat and 2 tabs on , Disp: 34 tablet, Rfl: 3    Allergies   Allergen Reactions    Pollen Extract Sneezing       History   Drug Use No       Social History Never a smoker          Equipment:  Pump            Type: Medela Pump in Style  Frequency of Use: None yet      Equipment Problems: no    Mom:  Breast: Full, symmetrical breasts  Well healed surgical scars around the areola, vertically from the areola to the inframammary fold and along the inframammary fold  Nipple Assessment in General: Normal: elongated/eraser, no discoloration and no damage noted  Susi Arce reports full sensation in the breasts, areola and nipples  Support System: FOB  History of Breastfeeding: Attempted with first child  Abundant colostrum early and transitioned to milk within 4-5 days  Difficulty expressing milk in the first weeks and then weaned  Changes/Stressors/Violence: Susi Arce wants to have a more positive breastfeeding experience with this child  Concerns/Goals: Susi Arce would like to achieve EBF but knows that supplementation may be necessary  She wants to maximize her milk production and feed at the breast as often as possible  Problems with Mom: PSH reduction mammoplasty  Hypothyroid    Physical Exam   Constitutional: She is oriented to person, place, and time  She appears well-developed and well-nourished  HENT:   Head: Normocephalic and atraumatic  Neck: Normal range of motion  Cardiovascular: Normal rate, regular rhythm and normal heart sounds  Pulmonary/Chest: Effort normal and breath sounds normal    Musculoskeletal: Normal range of motion  Neurological: She is alert and oriented to person, place, and time  Skin: Skin is warm and dry  Psychiatric: She has a normal mood and affect  Her behavior is normal  Judgment and thought content normal          Handouts:   Hands on pumping, Hand expression, Increasing your supply and Latch Check List    Education:  Reviewed Latch: Discussed how effective latch is important from the very beginning   Encouraged Susi Arce to take a prenatal breastfeeding class, review instructional videos and to ask for help from lactation at delivery  Reviewed Frequency/Supply & Demand: Discussed how early and frequent breast stimulation will help maximize milk making potential   Feeding on demand, hand expression and effective pumping will help increase prolactin receptors in the early weeks post partum  Prenatal hand expression/pumping after 38 weeks may also help  Reviewed Alternative/Artificial Feedings: Discussed SNS at the breast or finger feeding for supplementation if needed  Reviewed Equipment: Encouraged Aure Willard to research pumps and to obtain the best quality pump possible  Reviewed the importance of skin to skin at delivery and as frequently as possible in the early weeks to help establish effective breastfeeding and supply  Discussed herbal and prescription galactogogues at Priscilla's request   Hand outs given  Referred to [facility]  org for more discussion and information  Plan:  Discuss birth plan with OB prior to delivery  Discuss with OB prenatal hand expression/pumping after 38 weeks to help with milk supply  Consider herbal or prescription galactogues with guidance from a licensed professional (homeopath or physician/advanced practitioner)  Establish effective breastfeeding as early as possible and seek help from lactation in hospital and immediately post discharge  I have spent 60 minutes with Patient  today in which greater than 50% of this time was spent in counseling/coordination of care regarding Patient and family education

## 2019-01-23 NOTE — ASSESSMENT & PLAN NOTE
TSH at goal  Continue Synthroid at current dose  Check TSH/Free T4 now that she is in the 3rd trimester  Post-Delivery, reduce Synthroid to 137mcg daily  Check TSH/free T4 6 weeks post-delivery  She needs to continue brand name synthroid  If she has trouble getting medication she will let us know  Another option is Synthroid Direct program through [facility]

## 2019-01-23 NOTE — PATIENT INSTRUCTIONS
Talk to your OB about your desire to breastfeed and about the issues experienced with your first breastfeeding experience  Develop a birth plan that includes immediate skin to skin at delivery as long as mother and baby are stable  Skin to skin can be done even after surgical birth  If skin to skin needs to be delayed, ask to have skin to skin time as soon as possible  Attempt your baby's first feeding within an hour of birth if possible  Feed your baby on demand and have a lactation professional assess latch early  Use additional hand expression and pumping in the early weeks to maximize your milk supply  Supplement as needed following ABM protocol regarding appropriate volumes for supplementation  Use an SNS at the breast if your baby is latching well  Finger feeding can also be used in the early days  Monitor your baby's wet and soiled diapers closely to determine if he is getting enough milk  Discuss herbal and prescription galactogogues with a homeopath or with a breastfeeding physician or advanced practitioner  Follow up in this office within a few days of hospital discharge to follow up  Congratulations! Please call with any questions or concerns

## 2019-01-23 NOTE — PROGRESS NOTES
Established Patient Progress Note       Chief Complaint   Patient presents with    Hypothyroidism    Vitamin D Deficiency        History of Present Illness:     Violetta Kirk is a 26 y o  female with a history of hypothyroidism due to hashimotos thyroiditis, Thyroid Nodule, and Vitamin D Deficiency  For Hypothyroidism, she is taking Synthroid 137mcg Mon-Sat, 2 tabs on   She is currently 30 weeks pregnant and doing well  She did require increase in synthroid early in pregnancy  Prior to pregnancy she was taking 1/2 tab on   She needs to continue with brand name synthroid  When switched to generic TSH increased to 10-- this was about 2 years ago when treated in [place] at [facility]  Since switched to synthroid, has been doing well  She does have history of thyroid nodule which required FNA in - most recent ultrasound showed no nodules  For Vitamin D Deficiency she is taking supplements       Patient Active Problem List   Diagnosis    Cystic fibrosis carrier, antepartum    Hypothyroidism due to Hashimoto's thyroiditis    Multiple thyroid nodules    Rh negative, maternal    Vitamin D deficiency    Vitiligo    Advanced maternal age in multigravida, second trimester    History of     GBS bacteriuria    Encounter for supervision of normal pregnancy in third trimester    Low-lying placenta    Hypothyroidism during pregnancy in third trimester    History of infertility      Past Medical History:   Diagnosis Date    Abnormal Pap smear of cervix     Female infertility     3 years of infertilty treatments; this is a spontaneous pregnancy    H/O Hashimoto thyroiditis     HPV (human papilloma virus) infection     In vitro fertilization     Migraine     PVC (premature ventricular contraction)     daily had them intermittently    Status post hysteroscopic surgical removal of uterine septum     Status post resection of uterine septum     Thyroid nodule     Varicella     childhood    Vitiligo       Past Surgical History:   Procedure Laterality Date    BREAST SURGERY      RECONSTRUCTION     SECTION       SECTION      COLPOSCOPY      DILATION AND CURETTAGE OF UTERUS      age 19 with TOP, and SAB in 2017    REDUCTION MAMMAPLASTY        Family History   Problem Relation Age of Onset    Hypertension Mother     Multiple sclerosis Mother     Hashimoto's thyroiditis Mother     Factor V Leiden deficiency Mother     Celiac disease Sister     Thyroid disease Sister     Factor V Leiden deficiency Sister     Hashimoto's thyroiditis Brother     Lupus Brother     No Known Problems Father     Deep vein thrombosis Maternal Grandfather      Social History   Substance Use Topics    Smoking status: Never Smoker    Smokeless tobacco: Never Used    Alcohol use 3 0 oz/week     5 Glasses of wine per week      Comment: not during pregnancy     Allergies   Allergen Reactions    Pollen Extract Sneezing       Current Outpatient Prescriptions:     Cholecalciferol (VITAMIN D3) 2000 units capsule, Take 2,000 Units by mouth daily  , Disp: , Rfl:     docusate sodium (COLACE) 100 mg capsule, Take 100 mg by mouth daily, Disp: , Rfl:     PRENATAL MULTIVIT-MIN-FE-FA PO, Take 1 tablet by mouth, Disp: , Rfl:     SYNTHROID 137 MCG tablet, Take one tab Mon-Sat and 2 tabs on  Brand necessary, Disp: 102 tablet, Rfl: 3    Review of Systems   Constitutional: Negative for activity change, appetite change, chills, diaphoresis, fatigue, fever and unexpected weight change  HENT: Negative for trouble swallowing and voice change  Eyes: Negative for visual disturbance  Respiratory: Negative for shortness of breath  Cardiovascular: Negative for chest pain and palpitations  Gastrointestinal: Negative for abdominal pain, constipation and diarrhea  Endocrine: Negative for cold intolerance, heat intolerance, polydipsia, polyphagia and polyuria  Genitourinary: Negative for frequency and menstrual problem  Musculoskeletal: Negative for arthralgias and myalgias  Skin: Negative for rash  Allergic/Immunologic: Negative for food allergies  Neurological: Negative for dizziness and tremors  Hematological: Negative for adenopathy  Psychiatric/Behavioral: Negative for sleep disturbance  All other systems reviewed and are negative  Physical Exam:  Body mass index is 36 04 kg/m²  /82   Pulse 100   Ht 5' 8" (1 727 m)   Wt 108 kg (237 lb)   LMP 06/26/2018 (Exact Date)   BMI 36 04 kg/m²    Wt Readings from Last 3 Encounters:   01/23/19 108 kg (237 lb)   01/09/19 104 kg (229 lb)   12/26/18 103 kg (226 lb)       Physical Exam   Constitutional: She is oriented to person, place, and time  She appears well-developed and well-nourished  No distress  HENT:   Head: Normocephalic and atraumatic  Eyes: Pupils are equal, round, and reactive to light  Conjunctivae are normal    Neck: Normal range of motion  Neck supple  No thyromegaly present  Cardiovascular: Normal rate, regular rhythm and normal heart sounds  Pulmonary/Chest: Effort normal and breath sounds normal  No respiratory distress  She has no wheezes  She has no rales  Abdominal: She exhibits no distension (deferred- pregnant)  There is no tenderness  Musculoskeletal: Normal range of motion  She exhibits no edema  Neurological: She is alert and oriented to person, place, and time  Skin: Skin is warm and dry  Psychiatric: She has a normal mood and affect  Vitals reviewed        Labs:     Component      Latest Ref Rng & Units 3/6/2018 8/9/2018 9/10/2018 11/20/2018   TSH 3RD GENERATON      0 358 - 3 740 uIU/mL 0 159 (L) 3 310 0 918 0 507   Free T4      0 76 - 1 46 ng/dL 1 21 1 01 1 38 0 98   Vit D, 25-Hydroxy      30 0 - 100 0 ng/mL  22 4 (L)       Impression & Plan:    Problem List Items Addressed This Visit     Hypothyroidism due to Hashimoto's thyroiditis - Primary Relevant Medications    SYNTHROID 137 MCG tablet    Other Relevant Orders    TSH, 3rd generation    T4, free    TSH, 3rd generation    T4, free    Multiple thyroid nodules     No distinct nodules on most recent ultrasound  Relevant Medications    SYNTHROID 137 MCG tablet    Vitamin D deficiency     Continue Supplements  Relevant Orders    Vitamin D 25 hydroxy    Hypothyroidism during pregnancy in third trimester     TSH at goal  Continue Synthroid at current dose  Check TSH/Free T4 now that she is in the 3rd trimester  Post-Delivery, reduce Synthroid to 137mcg daily  Check TSH/free T4 6 weeks post-delivery  She needs to continue brand name synthroid  If she has trouble getting medication she will let us know  Another option is Synthroid Direct program through [facility]  Relevant Medications    SYNTHROID 137 MCG tablet          Orders Placed This Encounter   Procedures    TSH, 3rd generation     This is a patient instruction: This test is non-fasting  Please drink two glasses of water morning of bloodwork  Standing Status:   Future     Standing Expiration Date:   7/23/2019    T4, free     Standing Status:   Future     Standing Expiration Date:   7/23/2019    TSH, 3rd generation     This is a patient instruction: This test is non-fasting  Please drink two glasses of water morning of bloodwork  Standing Status:   Future     Standing Expiration Date:   1/23/2020    T4, free     Standing Status:   Future     Standing Expiration Date:   1/23/2020    Vitamin D 25 hydroxy     Standing Status:   Future     Standing Expiration Date:   1/23/2020       There are no Patient Instructions on file for this visit  Discussed with the patient and all questioned fully answered  She will call me if any problems arise  Follow-up appointment in 6 months       Counseled patient on diagnostic results, prognosis, risk and benefit of treatment options, instruction for management, importance of treatment compliance, Risk  factor reduction and impressions      Aneta Closs, PA-C

## 2019-01-24 ENCOUNTER — ROUTINE PRENATAL (OUTPATIENT)
Dept: OBGYN CLINIC | Facility: CLINIC | Age: 34
End: 2019-01-24
Payer: COMMERCIAL

## 2019-01-24 ENCOUNTER — TELEPHONE (OUTPATIENT)
Dept: OBGYN CLINIC | Facility: CLINIC | Age: 34
End: 2019-01-24

## 2019-01-24 VITALS — WEIGHT: 233.2 LBS | DIASTOLIC BLOOD PRESSURE: 68 MMHG | BODY MASS INDEX: 35.46 KG/M2 | SYSTOLIC BLOOD PRESSURE: 114 MMHG

## 2019-01-24 DIAGNOSIS — Z3A.30 30 WEEKS GESTATION OF PREGNANCY: Primary | ICD-10-CM

## 2019-01-24 DIAGNOSIS — Z23 NEED FOR TDAP VACCINATION: ICD-10-CM

## 2019-01-24 DIAGNOSIS — Z34.93 THIRD TRIMESTER PREGNANCY: ICD-10-CM

## 2019-01-24 PROCEDURE — PNV: Performed by: OBSTETRICS & GYNECOLOGY

## 2019-01-24 PROCEDURE — 90471 IMMUNIZATION ADMIN: CPT

## 2019-01-24 PROCEDURE — 90715 TDAP VACCINE 7 YRS/> IM: CPT

## 2019-01-24 NOTE — TELEPHONE ENCOUNTER
Patient would like to schedule her c/section with you on 4/1/19  I told her someone will be in touch once this is scheduled

## 2019-01-24 NOTE — PROGRESS NOTES
Labs done, A neg, had Rhogam on 1/9/19, s/p flu shot, Tdap today  Breast pump order and check in card given  Patient feels good

## 2019-01-24 NOTE — PROGRESS NOTES
CC: Pregnancy Lashell Martins is a 26 y o  female [id number] who presents for prenatal visit at 30w2d by Estimated Date of Delivery: 19  HPI: Pt denies pelvic pressure, and frequency  Occasional Back Pain; No vaginal pain, no contractions, no LOF or bleeding, Active fetus  Past obstetric, gynecologic, medical, surgical, family, and social history reviewed and updated  ROS: Feels well  No frequent or severe HA or RUQ pain  Exam: VSS, abd- soft, NT, gravid, Fundus-NT, Back- no CVA   Pt is in no acute distress         A/P:            1  Prenatal Visit           2   labor precautions discussed          3  AMA          4  Hypothyroidism on Synthroid - she has an order to have her thyroid rechecked          5  Previous c/s - She desires TOLAC if she goes into labor before her due date and if not desires to have a scheduled repeat c/s on 19 - Discussed risks of TOLAC including risks of uterine rupture  All questions answered

## 2019-02-05 ENCOUNTER — ROUTINE PRENATAL (OUTPATIENT)
Dept: OBGYN CLINIC | Facility: CLINIC | Age: 34
End: 2019-02-05

## 2019-02-05 VITALS — BODY MASS INDEX: 35.55 KG/M2 | WEIGHT: 233.8 LBS | SYSTOLIC BLOOD PRESSURE: 110 MMHG | DIASTOLIC BLOOD PRESSURE: 72 MMHG

## 2019-02-05 DIAGNOSIS — O44.40 LOW-LYING PLACENTA: Primary | ICD-10-CM

## 2019-02-05 DIAGNOSIS — O99.283 HYPOTHYROIDISM DURING PREGNANCY IN THIRD TRIMESTER: ICD-10-CM

## 2019-02-05 DIAGNOSIS — Z98.891 HISTORY OF C-SECTION: ICD-10-CM

## 2019-02-05 DIAGNOSIS — E03.9 HYPOTHYROIDISM DURING PREGNANCY IN THIRD TRIMESTER: ICD-10-CM

## 2019-02-05 DIAGNOSIS — Z3A.32 32 WEEKS GESTATION OF PREGNANCY: ICD-10-CM

## 2019-02-05 PROCEDURE — PNV: Performed by: OBSTETRICS & GYNECOLOGY

## 2019-02-05 NOTE — ASSESSMENT & PLAN NOTE
I encouraged patient to go for her TSH that has been ordered  She does have growth ultrasound upcoming

## 2019-02-05 NOTE — ASSESSMENT & PLAN NOTE
Patient had previous  due to breech presentation found late in her pregnancy  We discussed the high success rate of TOLAC given that history  We discussed the risk of uterine rupture at 0 5%  We also discussed that outcomes are most favorable when patient presents in active labor  Patient did also mention that should she not have onset of spontaneous labor by her EDC she would choose to have repeat  section

## 2019-02-05 NOTE — PROGRESS NOTES
A neg, s/p rhogam on 1/9/19  S/p flu and Tdap  Passed 1 hour GTT  Patient would like to schedule c/section with Dr Tash Box 4/1/19 , message was sent to Dr Tash Box  Perineal massage info given  Patient called to scheduled check in appt, she was told schedule was not available and she should call back in a few weeks

## 2019-02-05 NOTE — PROGRESS NOTES
Problem List Items Addressed This Visit        Endocrine    Hypothyroidism during pregnancy in third trimester     I encouraged patient to go for her TSH that has been ordered  She does have growth ultrasound upcoming  Other    History of      Patient had previous  due to breech presentation found late in her pregnancy  We discussed the high success rate of TOLAC given that history  We discussed the risk of uterine rupture at 0 5%  We also discussed that outcomes are most favorable when patient presents in active labor  Patient did also mention that should she not have onset of spontaneous labor by her EDC she would choose to have repeat  section  Low-lying placenta - Primary     Patient has follow-up in the  Center in 2 weeks to re-evaluate placental location  32 weeks gestation of pregnancy     Patient feels well without complaint  Baby has been active  Perineal massage protocol reviewed

## 2019-02-07 ENCOUNTER — TELEPHONE (OUTPATIENT)
Dept: ENDOCRINOLOGY | Facility: CLINIC | Age: 34
End: 2019-02-07

## 2019-02-07 ENCOUNTER — APPOINTMENT (OUTPATIENT)
Dept: LAB | Facility: HOSPITAL | Age: 34
End: 2019-02-07
Payer: COMMERCIAL

## 2019-02-07 DIAGNOSIS — E03.8 HYPOTHYROIDISM DUE TO HASHIMOTO'S THYROIDITIS: ICD-10-CM

## 2019-02-07 DIAGNOSIS — E06.3 HYPOTHYROIDISM DUE TO HASHIMOTO'S THYROIDITIS: ICD-10-CM

## 2019-02-07 LAB
T4 FREE SERPL-MCNC: 0.94 NG/DL (ref 0.76–1.46)
TSH SERPL DL<=0.05 MIU/L-ACNC: 1.93 UIU/ML (ref 0.36–3.74)

## 2019-02-07 PROCEDURE — 84443 ASSAY THYROID STIM HORMONE: CPT

## 2019-02-07 PROCEDURE — 36415 COLL VENOUS BLD VENIPUNCTURE: CPT

## 2019-02-07 PROCEDURE — 84439 ASSAY OF FREE THYROXINE: CPT

## 2019-02-07 NOTE — TELEPHONE ENCOUNTER
----- Message from Lesa Gowers, PA-C sent at 2/7/2019 12:38 PM EST -----  TSH at goal for third trimester at 1 928-- continue thyroid medication  Make reduction post delivery to 137mcg daily as discussed and repeat labs 6 weeks after delivery or sooner if symptomatic-- she should already have this lab slip

## 2019-02-13 ENCOUNTER — TELEPHONE (OUTPATIENT)
Dept: OBGYN CLINIC | Facility: CLINIC | Age: 34
End: 2019-02-13

## 2019-02-13 NOTE — TELEPHONE ENCOUNTER
Regarding: Visit Follow-Up Question  Contact: [phone]  ----- Message from [address] [place] [address] [address], Generic sent at 2019 12:07 PM EST -----    Hi Dr Lilly Mccarty and I have decided to hope for a trial of labor and successful   However, I dont want to go post-due date, and dont want to worry about added risk with any sort of induction, or lower success after 40weeks, etc  So our back up plan is to have a  with you if I dont go into labor before Im due  That being said, you told me you are on labor service , so thatd be 19 that Id be hoping for the scheduled csection with you, again only if labor doesnt happen before then  Is this reasonable in your opinion? Can we get this scheduled tentatively ahead of time? Let me know what you think  Many thanks      Carmela Lucas

## 2019-02-18 DIAGNOSIS — E06.3 HYPOTHYROIDISM DUE TO HASHIMOTO'S THYROIDITIS: ICD-10-CM

## 2019-02-18 DIAGNOSIS — E03.8 HYPOTHYROIDISM DUE TO HASHIMOTO'S THYROIDITIS: ICD-10-CM

## 2019-02-18 RX ORDER — LEVOTHYROXINE SODIUM 137 MCG
TABLET ORAL
Qty: 102 TABLET | Refills: 3 | Status: SHIPPED | OUTPATIENT
Start: 2019-02-18 | End: 2020-04-23 | Stop reason: SDUPTHER

## 2019-02-18 NOTE — TELEPHONE ENCOUNTER
Regarding: Prescription Question  Contact: [phone]  ----- Message from [address] [place] [address] [address], Generic sent at 2/14/2019  9:20 AM EST -----    Christopher Cavazos,    I was able to fill a 30 day supply of Synthroid at the [facility]  However, my [employer] healthplan requires subsequent fills to go through [employer]  I have faxed in an enrollment to their pharmacy for mail order services, so should be able to use that for my next fill due in another 1-2 weeks  Can you prescribe a 90 day supply to their mail order pharmacy for me when you have a chance? I am told they will cover the brand Synthroid for me at a 20% co-pay for a 90 day supply at their pharmacy  I sure hope that is true! They said no prior-auth will be needed  Thanks again      Simon Phillip

## 2019-02-19 ENCOUNTER — TELEPHONE (OUTPATIENT)
Dept: OBGYN CLINIC | Facility: CLINIC | Age: 34
End: 2019-02-19

## 2019-02-19 NOTE — TELEPHONE ENCOUNTER
Regarding: Visit Follow-Up Question  Contact: [phone]  ----- Message from [address] [place] [address] [address], Generic sent at 2019  7:43 PM EST -----    Hi, can someone please get back to me, Dr Guadalupe Skiff herself would be great, to respond to my question about scheduling a  with her on ? I have been asking about this since my 30week visit       Thanks

## 2019-02-19 NOTE — TELEPHONE ENCOUNTER
Regarding: RE:C/S  Contact: [phone]  ----- Message from [address] [place] [address] [address], Generic sent at 2/19/2019  8:41 AM EST -----    Bell Mcelroy, that is fine  I appreciate you telling me that, as I dont know how far how your doctors make schedules  Dr Rey Bowden had made the suggestion that I ask to schedule the c/s at the [facility] visit to have it on the calendar  Also hope to hear from her if she thinks this is a reasonable approach  Thanks again    ----- Message -----  From: Nurse Clint Guillen: 2/19/2019  7:36 AM EST  To: Bob Arizmendi  Subject: C/S  Adali Khalil,    I will forward this request to Dr Tarsha Crowe  She will not be in the office until this afternoon   I'm not sure if the on call schedule for April has been finalized as of yet which would explain the delay in scheduling  I apologize for any inconvenience  Either Dr Rey Bowden or a member of our nursing staff will get back to you this afternoon  Have a wonderful day

## 2019-02-21 ENCOUNTER — TELEPHONE (OUTPATIENT)
Dept: OBGYN CLINIC | Facility: CLINIC | Age: 34
End: 2019-02-21

## 2019-02-21 NOTE — TELEPHONE ENCOUNTER
Anticoagulation Clinic Progress Note    Anticoagulation Summary  As of 2018    INR goal:   2.0-3.0   TTR:   100.0 % (1.5 mo)   INR used for dosin.6 (2018)   Warfarin maintenance plan:   2.5 mg on Tue, Fri; 5 mg all other days   Weekly warfarin total:   30 mg   No change documented:   Maggie White RPH   Plan last modified:   Lyly Vaz RPH (10/9/2018)   Next INR check:   2018   Priority:   Maintenance   Target end date:   Indefinite    Indications    Atrial fibrillation (CMS/[facility]) [I48.91]             Anticoagulation Episode Summary     INR check location:       Preferred lab:       Send INR reminders to:    STEVAN MONTIEL [facility]    Comments:         Anticoagulation Care Providers     Provider Role Specialty Phone number    Gopal Kurtz MD Referring Cardiology [phone]    Lyly Vaz RPH Responsible Pharmacy [phone]          Drug interactions: has remained unchanged.  Diet: has remained unchanged.    Clinic Interview:  Patient Findings     Negatives:   Signs/symptoms of thrombosis, Signs/symptoms of bleeding,   Laboratory test error suspected, Change in health, Change in alcohol use,   Change in activity, Upcoming invasive procedure, Emergency department   visit, Upcoming dental procedure, Missed doses, Extra doses, Change in   medications, Change in diet/appetite, Hospital admission, Bruising, Other   complaints      Clinical Outcomes     Negatives:   Major bleeding event, Thromboembolic event,   Anticoagulation-related hospital admission, Anticoagulation-related ED   visit, Anticoagulation-related fatality        INR History:  Anticoagulation Monitoring 10/9/2018 2018 2018   INR 2.2 2.4 2.6   INR Date 10/9/2018 2018 2018   INR Goal 2.0-3.0 2.0-3.0 2.0-3.0   Trend Down Same Same   Last Week Total 32.5 mg 30 mg 30 mg   Next Week Total 30 mg 30 mg 30 mg   Sun 5 mg 5 mg 5 mg   Mon 5 mg 5 mg 5 mg   Tue 2.5 mg 2.5 mg 2.5 mg   Wed 5 mg 5 mg 5 mg  Regarding: RE:C/S  Contact: [phone]  ----- Message from [address] [place] [address] [address], Generic sent at 2/20/2019  2:44 PM EST -----    Thank you! Is she the one scheduled to do the c/s that day?  ----- Message -----  From: Nurse Arellano [place]: 2/20/2019  2:37 PM EST  To: Stacie Hoff  Subject: C/S  Dr Reymundo Dee has scheduled you for a C/S on 4/1/19 at 10:00 a m  I hope this works for you  If you have any questions please do not hesitate to call our office    Thu 5 mg 5 mg 5 mg   Fri 2.5 mg 2.5 mg 2.5 mg   Sat 5 mg 5 mg 5 mg   Visit Report - - -   Some recent data might be hidden           Plan:  1. INR is Therapeutic today- see above in Anticoagulation Summary.  Will instruct Vish Morin to Continue their warfarin regimen- see above in Anticoagulation Summary.  2. Follow up in 1 month  3. Patient declines warfarin refills.  4. Verbal and written information provided. Patient expresses understanding and has no further questions at this time.    Maggie White [facility]

## 2019-02-22 ENCOUNTER — ROUTINE PRENATAL (OUTPATIENT)
Dept: OBGYN CLINIC | Facility: CLINIC | Age: 34
End: 2019-02-22

## 2019-02-22 VITALS — BODY MASS INDEX: 36.34 KG/M2 | WEIGHT: 239 LBS | SYSTOLIC BLOOD PRESSURE: 118 MMHG | DIASTOLIC BLOOD PRESSURE: 78 MMHG

## 2019-02-22 DIAGNOSIS — E03.9 HYPOTHYROIDISM DURING PREGNANCY IN THIRD TRIMESTER: ICD-10-CM

## 2019-02-22 DIAGNOSIS — Z3A.34 34 WEEKS GESTATION OF PREGNANCY: ICD-10-CM

## 2019-02-22 DIAGNOSIS — O99.283 HYPOTHYROIDISM DURING PREGNANCY IN THIRD TRIMESTER: ICD-10-CM

## 2019-02-22 DIAGNOSIS — Z98.891 HISTORY OF C-SECTION: ICD-10-CM

## 2019-02-22 DIAGNOSIS — Z34.83 ENCOUNTER FOR SUPERVISION OF OTHER NORMAL PREGNANCY IN THIRD TRIMESTER: Primary | ICD-10-CM

## 2019-02-22 DIAGNOSIS — O44.40 LOW-LYING PLACENTA: ICD-10-CM

## 2019-02-22 PROCEDURE — PNV: Performed by: NURSE PRACTITIONER

## 2019-02-22 RX ORDER — RANITIDINE 150 MG/1
150 TABLET ORAL 2 TIMES DAILY
COMMUNITY
End: 2019-07-17 | Stop reason: ALTCHOICE

## 2019-02-22 NOTE — PROGRESS NOTES
Problem List Items Addressed This Visit        Endocrine    Hypothyroidism during pregnancy in third trimester     TSH on 19 was within goal at 1 928  No dose adjustments on Synthroid  Other    History of     Encounter for supervision of normal pregnancy in third trimester - Primary     Denies OB complaints  Good fetal movement  Denies contractions, cramping, leakage of fluid or vaginal bleeding  Taking Zantac daily for heart burn  Has  scheduled for  at 10am  Patient states if she goes into labor before this, she will try TOLAC  If not, she will have scheduled    Reviewed  labor precautions and FKCs  RTO in 2 weeks  Low-lying placenta     Patient has f/u with  center  to evaluate placenta location  Original f/u was rescheduled d/t the weather           34 weeks gestation of pregnancy

## 2019-02-22 NOTE — ASSESSMENT & PLAN NOTE
Patient has f/u with  center  to evaluate placenta location  Original f/u was rescheduled d/t the weather

## 2019-02-22 NOTE — ASSESSMENT & PLAN NOTE
Denies OB complaints  Good fetal movement  Denies contractions, cramping, leakage of fluid or vaginal bleeding  Taking Zantac daily for heart burn  Has  scheduled for  at 10am  Patient states if she goes into labor before this, she will try TOLAC  If not, she will have scheduled    Reviewed  labor precautions and FKCs  RTO in 2 weeks

## 2019-02-27 ENCOUNTER — ULTRASOUND (OUTPATIENT)
Dept: PERINATAL CARE | Facility: CLINIC | Age: 34
End: 2019-02-27
Payer: COMMERCIAL

## 2019-02-27 VITALS
BODY MASS INDEX: 36.4 KG/M2 | WEIGHT: 240.2 LBS | DIASTOLIC BLOOD PRESSURE: 86 MMHG | HEIGHT: 68 IN | SYSTOLIC BLOOD PRESSURE: 117 MMHG | HEART RATE: 81 BPM

## 2019-02-27 DIAGNOSIS — O36.63X0 EXCESSIVE FETAL GROWTH AFFECTING MANAGEMENT OF PREGNANCY IN THIRD TRIMESTER, SINGLE OR UNSPECIFIED FETUS: ICD-10-CM

## 2019-02-27 DIAGNOSIS — O09.522 ADVANCED MATERNAL AGE IN MULTIGRAVIDA, SECOND TRIMESTER: ICD-10-CM

## 2019-02-27 DIAGNOSIS — Z3A.35 35 WEEKS GESTATION OF PREGNANCY: Primary | ICD-10-CM

## 2019-02-27 DIAGNOSIS — Z34.83 ENCOUNTER FOR SUPERVISION OF OTHER NORMAL PREGNANCY IN THIRD TRIMESTER: ICD-10-CM

## 2019-02-27 DIAGNOSIS — Z98.891 HISTORY OF C-SECTION: ICD-10-CM

## 2019-02-27 PROBLEM — O09.529 AMA (ADVANCED MATERNAL AGE) MULTIGRAVIDA 35+: Status: ACTIVE | Noted: 2019-02-27

## 2019-02-27 PROBLEM — O36.60X0 LGA (LARGE FOR GESTATIONAL AGE) FETUS AFFECTING MANAGEMENT OF MOTHER: Status: ACTIVE | Noted: 2019-02-27

## 2019-02-27 PROCEDURE — 76816 OB US FOLLOW-UP PER FETUS: CPT | Performed by: OBSTETRICS & GYNECOLOGY

## 2019-02-27 NOTE — PROGRESS NOTES
A transvaginal ultrasound was performed   Sonographer note on use of High Level Disinfection Process (Trophon) for transvaginal probe# 4 used, serial # [id number]  Joyce Terry RDMS

## 2019-03-05 ENCOUNTER — ROUTINE PRENATAL (OUTPATIENT)
Dept: OBGYN CLINIC | Facility: CLINIC | Age: 34
End: 2019-03-05

## 2019-03-05 VITALS — DIASTOLIC BLOOD PRESSURE: 70 MMHG | SYSTOLIC BLOOD PRESSURE: 122 MMHG | WEIGHT: 242 LBS | BODY MASS INDEX: 36.8 KG/M2

## 2019-03-05 DIAGNOSIS — Z34.93 THIRD TRIMESTER PREGNANCY: Primary | ICD-10-CM

## 2019-03-05 DIAGNOSIS — Z3A.36 36 WEEKS GESTATION OF PREGNANCY: ICD-10-CM

## 2019-03-05 PROCEDURE — 87653 STREP B DNA AMP PROBE: CPT | Performed by: OBSTETRICS & GYNECOLOGY

## 2019-03-05 PROCEDURE — PNV: Performed by: OBSTETRICS & GYNECOLOGY

## 2019-03-05 NOTE — PROGRESS NOTES
Problem List Items Addressed This Visit        Other    36 weeks gestation of pregnancy     Patient feels well without complaint  We did review her most recent fetal ultrasound demonstrating fetal growth in the 95th percentile  Patient now believes that she would like to plan schedule  but would like to do at an earlier date than what was already scheduled  Scheduling book was reviewed and patient is now scheduled for  at 10:00 a m  with myself             Other Visit Diagnoses     Third trimester pregnancy    -  Primary    Relevant Orders    Strep B DNA probe, amplification

## 2019-03-05 NOTE — PROGRESS NOTES
A neg, received rhogam, s/p flu/tdap, c/section on 4/1 with Dr Shabbir Callejas  GBS today, gave last few weeks of pregnancy info  Having good fetal movement

## 2019-03-05 NOTE — ASSESSMENT & PLAN NOTE
Patient feels well without complaint  We did review her most recent fetal ultrasound demonstrating fetal growth in the 95th percentile  Patient now believes that she would like to plan schedule  but would like to do at an earlier date than what was already scheduled  Scheduling book was reviewed and patient is now scheduled for  at 10:00 a m  with myself

## 2019-03-07 LAB — GP B STREP DNA SPEC QL NAA+PROBE: ABNORMAL

## 2019-03-13 ENCOUNTER — ROUTINE PRENATAL (OUTPATIENT)
Dept: OBGYN CLINIC | Facility: CLINIC | Age: 34
End: 2019-03-13

## 2019-03-13 VITALS — SYSTOLIC BLOOD PRESSURE: 128 MMHG | WEIGHT: 245 LBS | DIASTOLIC BLOOD PRESSURE: 88 MMHG | BODY MASS INDEX: 37.25 KG/M2

## 2019-03-13 DIAGNOSIS — Z34.83 ENCOUNTER FOR SUPERVISION OF OTHER NORMAL PREGNANCY IN THIRD TRIMESTER: Primary | ICD-10-CM

## 2019-03-13 PROCEDURE — PNV: Performed by: OBSTETRICS & GYNECOLOGY

## 2019-03-13 NOTE — PROGRESS NOTES
BP recheck: 128/88  I asked her how she was feeling about her decision to proceed with earlier   She states she is now feeling less certain  Last week, her  had convinced her that a scheduled  was better particularly given the larger baby  However, she is now vascillating and may want to wait longer  Encouraged her to continue to think and let us know  It does sound like if she goes into labor before her scheduled  date that she will try to labor  Will continue to discuss  Aware GBS+  All questions answered

## 2019-03-19 ENCOUNTER — ULTRASOUND (OUTPATIENT)
Dept: PERINATAL CARE | Facility: CLINIC | Age: 34
End: 2019-03-19
Payer: COMMERCIAL

## 2019-03-19 VITALS
DIASTOLIC BLOOD PRESSURE: 83 MMHG | HEART RATE: 101 BPM | SYSTOLIC BLOOD PRESSURE: 119 MMHG | BODY MASS INDEX: 37.44 KG/M2 | HEIGHT: 68 IN | WEIGHT: 247 LBS

## 2019-03-19 DIAGNOSIS — E03.8 HYPOTHYROIDISM DUE TO HASHIMOTO'S THYROIDITIS: ICD-10-CM

## 2019-03-19 DIAGNOSIS — Z3A.38 38 WEEKS GESTATION OF PREGNANCY: ICD-10-CM

## 2019-03-19 DIAGNOSIS — E03.9 HYPOTHYROIDISM DURING PREGNANCY IN THIRD TRIMESTER: ICD-10-CM

## 2019-03-19 DIAGNOSIS — O40.3XX0 POLYHYDRAMNIOS AFFECTING PREGNANCY IN THIRD TRIMESTER: ICD-10-CM

## 2019-03-19 DIAGNOSIS — E06.3 HYPOTHYROIDISM DUE TO HASHIMOTO'S THYROIDITIS: ICD-10-CM

## 2019-03-19 DIAGNOSIS — Z87.42 HISTORY OF INFERTILITY: ICD-10-CM

## 2019-03-19 DIAGNOSIS — R82.71 GBS BACTERIURIA: ICD-10-CM

## 2019-03-19 DIAGNOSIS — O36.63X0 EXCESSIVE FETAL GROWTH AFFECTING MANAGEMENT OF PREGNANCY IN THIRD TRIMESTER, SINGLE OR UNSPECIFIED FETUS: Primary | ICD-10-CM

## 2019-03-19 DIAGNOSIS — O99.283 HYPOTHYROIDISM DURING PREGNANCY IN THIRD TRIMESTER: ICD-10-CM

## 2019-03-19 PROBLEM — O44.40 LOW-LYING PLACENTA: Status: RESOLVED | Noted: 2018-11-30 | Resolved: 2019-03-19

## 2019-03-19 PROCEDURE — 76816 OB US FOLLOW-UP PER FETUS: CPT | Performed by: OBSTETRICS & GYNECOLOGY

## 2019-03-19 PROCEDURE — 76819 FETAL BIOPHYS PROFIL W/O NST: CPT | Performed by: OBSTETRICS & GYNECOLOGY

## 2019-03-19 PROCEDURE — 99212 OFFICE O/P EST SF 10 MIN: CPT | Performed by: OBSTETRICS & GYNECOLOGY

## 2019-03-19 NOTE — PROGRESS NOTES
The patient was seen today for an ultrasound  Please see ultrasound report (located under Ob Procedures) for additional details  Thank you very much for allowing us to participate in the care of this very nice patient  Should you have any questions, please do not hesitate to contact me  Yusuf Ho MD [address] [facility]  Attending Physician, Christin

## 2019-03-19 NOTE — Clinical Note
Suspected macrosomia  Patient interested in moving back  section date to what was discussed with Dr Emeli Sabillon,    This had been changed by Dr April Mills and patient would like to move back as  discussed

## 2019-03-19 NOTE — PATIENT INSTRUCTIONS
Kick Counts in Pregnancy   AMBULATORY CARE:   Kick counts  measure how much your baby is moving in your womb  A kick from your baby can be felt as a twist, turn, swish, roll, or jab  It is common to feel your baby kicking at 26 to 28 weeks of pregnancy  You may feel your baby kick as early as 20 weeks of pregnancy  Seek care immediately if:   · You feel your baby kick less as the day goes on      · You do not feel any kicks in a day  Contact your healthcare provider if:   · You feel a change in the number of kicks or movements of your baby  · You feel fewer than 10 kicks within 2 hours after counting  · You have questions or concerns about your baby's movements  Why measure kick counts:  Your baby's movement may provide information about your baby's health  He may move less, or not at all, if there are problems  He may move less if he does not have enough room to grow in your uterus (womb)  He may also move less if he is not getting enough oxygen or nutrition from the placenta  Tell your healthcare provider as soon as you feel a change in your baby's movements  Problems that are found earlier are easier to treat  When to measure kick counts:   · Measure kick counts at the same time every day  · Measure kick counts when your baby is awake and most active  Your baby may be most active in the evening  · Measure kick counts after a meal or snack or when your baby is usually most active  Your baby may be more active after you eat or in the evening  · You should not smoke while you are pregnant  Smoking increases the risk of health problems for you and for your baby during your pregnancy  If you do smoke, wait 2 hours to measure kick counts  Nicotine can make your baby more active than usual   How to measure kick counts:  Check that your baby is awake before you measure kick counts  You can wake up your baby by lightly pushing on your belly, walking, or drinking something cold   Your healthcare provider may tell you different ways to measure kick counts  He/She may tell you to do the following:  · Use a chart or clock to keep track of the time you start and finish counting  · Sit in a chair or lie on your left side  · Place your hands on the largest part of your belly  · Count until you reach 10 kicks  Write down how much time it takes to count 10 kicks  · It may take 30 minutes to 2 hours to count 10 kicks  It should not take more than 2 hours to count 10 kicks  If you do not feel 10 kicks within 2 hours, Call your Obstetrician    Follow up with your healthcare provider as directed:  Write down your questions so you remember to ask them during your visits  © 2017 [address] Information is for End User's use only and may not be sold, redistributed or otherwise used for commercial purposes  All illustrations and images included in CareNotes® are the copyrighted property of A D A M , Inc  or Simone Varela  The above information is an  only  It is not intended as medical advice for individual conditions or treatments  Talk to your doctor, nurse or pharmacist before following any medical regimen to see if it is safe and effective for you

## 2019-03-20 ENCOUNTER — ROUTINE PRENATAL (OUTPATIENT)
Dept: OBGYN CLINIC | Facility: CLINIC | Age: 34
End: 2019-03-20

## 2019-03-20 ENCOUNTER — TELEPHONE (OUTPATIENT)
Dept: OBGYN CLINIC | Facility: CLINIC | Age: 34
End: 2019-03-20

## 2019-03-20 VITALS — BODY MASS INDEX: 37.56 KG/M2 | WEIGHT: 247 LBS | SYSTOLIC BLOOD PRESSURE: 120 MMHG | DIASTOLIC BLOOD PRESSURE: 74 MMHG

## 2019-03-20 DIAGNOSIS — E03.9 HYPOTHYROIDISM DURING PREGNANCY IN THIRD TRIMESTER: ICD-10-CM

## 2019-03-20 DIAGNOSIS — Z98.891 HISTORY OF C-SECTION: ICD-10-CM

## 2019-03-20 DIAGNOSIS — O26.893 RH NEGATIVE STATUS DURING PREGNANCY IN THIRD TRIMESTER: ICD-10-CM

## 2019-03-20 DIAGNOSIS — Z67.91 RH NEGATIVE STATUS DURING PREGNANCY IN THIRD TRIMESTER: ICD-10-CM

## 2019-03-20 DIAGNOSIS — R82.71 GBS BACTERIURIA: ICD-10-CM

## 2019-03-20 DIAGNOSIS — O09.523 AMA (ADVANCED MATERNAL AGE) MULTIGRAVIDA 35+, THIRD TRIMESTER: Primary | ICD-10-CM

## 2019-03-20 DIAGNOSIS — O99.283 HYPOTHYROIDISM DURING PREGNANCY IN THIRD TRIMESTER: ICD-10-CM

## 2019-03-20 PROBLEM — Z34.93 ENCOUNTER FOR SUPERVISION OF NORMAL PREGNANCY IN THIRD TRIMESTER: Status: RESOLVED | Noted: 2018-11-30 | Resolved: 2019-03-20

## 2019-03-20 PROBLEM — Z3A.38 38 WEEKS GESTATION OF PREGNANCY: Status: RESOLVED | Noted: 2019-02-05 | Resolved: 2019-03-20

## 2019-03-20 PROBLEM — O09.529 AMA (ADVANCED MATERNAL AGE) MULTIGRAVIDA 35+: Status: RESOLVED | Noted: 2019-02-27 | Resolved: 2019-03-20

## 2019-03-20 PROCEDURE — PNV: Performed by: PHYSICIAN ASSISTANT

## 2019-03-20 NOTE — ASSESSMENT & PLAN NOTE
Feels well overall  Good fetal movement  GBS in urine - treat in labor  Plans for TOLAC if she goes into labor prior to her EDC, but if no spontaneous labor, then plan for repeat  on  - date changed on L&D for  1000  Received flu shot, TDAP  Has breast pump slip

## 2019-03-20 NOTE — PROGRESS NOTES
Problem List Items Addressed This Visit        Endocrine    Hypothyroidism during pregnancy in third trimester     TSH on  was within normal range at 1 928; continue current dose of Synthroid            Other    Rh negative, maternal     Received RhoGam 19         AMA (advanced maternal age) multigravida 33+, third trimester - Primary     Feels well overall  Good fetal movement  GBS in urine - treat in labor  Plans for TOLAC if she goes into labor prior to her [facility], but if no spontaneous labor, then plan for repeat  on  - date changed on L&D for  1000  Received flu shot, TDAP  Has breast pump slip         History of      Plans for TOLAC if she goes into labor prior to her [facility], but if no spontaneous labor, then plan for repeat  on  - date changed on L&D for  1000         GBS bacteriuria

## 2019-03-28 ENCOUNTER — ROUTINE PRENATAL (OUTPATIENT)
Dept: OBGYN CLINIC | Facility: CLINIC | Age: 34
End: 2019-03-28

## 2019-03-28 VITALS — DIASTOLIC BLOOD PRESSURE: 82 MMHG | WEIGHT: 246.6 LBS | BODY MASS INDEX: 37.5 KG/M2 | SYSTOLIC BLOOD PRESSURE: 120 MMHG

## 2019-03-28 DIAGNOSIS — O09.523 AMA (ADVANCED MATERNAL AGE) MULTIGRAVIDA 35+, THIRD TRIMESTER: ICD-10-CM

## 2019-03-28 DIAGNOSIS — R82.71 GBS BACTERIURIA: Primary | ICD-10-CM

## 2019-03-28 DIAGNOSIS — Z98.891 HISTORY OF C-SECTION: ICD-10-CM

## 2019-03-28 PROCEDURE — PNV: Performed by: PHYSICIAN ASSISTANT

## 2019-03-28 NOTE — PROGRESS NOTES
Patient w/o complaints  (+) good fetal movement, denies any bleeding, fluid leakage or ctx  Head unable to be palpated on exam, US done to confirm vertex position    Problem List Items Addressed This Visit        Other    AMA (advanced maternal age) multigravida 33+, third trimester     For R C/S  with Dr Chas Mcfadden, hibiclens and showering instructions reviewed and given to patient  Discussed labor precautions, if goes into labor patient wants to attempt a   Reviewed fetal kick counts and reasons to call         History of     GBS bacteriuria - Primary

## 2019-03-28 NOTE — ASSESSMENT & PLAN NOTE
For R C/S  with Dr Shabbir Callejas, hibiclens and showering instructions reviewed and given to patient  Discussed labor precautions, if goes into labor patient wants to attempt a   Reviewed fetal kick counts and reasons to call

## 2019-03-31 PROCEDURE — ERR1 ERRONEOUS ENCOUNTER-DISREGARD: Performed by: STUDENT IN AN ORGANIZED HEALTH CARE EDUCATION/TRAINING PROGRAM

## 2019-04-01 ENCOUNTER — ANESTHESIA (INPATIENT)
Dept: LABOR AND DELIVERY | Facility: HOSPITAL | Age: 34
End: 2019-04-01
Payer: COMMERCIAL

## 2019-04-01 ENCOUNTER — ANESTHESIA EVENT (INPATIENT)
Dept: LABOR AND DELIVERY | Facility: HOSPITAL | Age: 34
End: 2019-04-01
Payer: COMMERCIAL

## 2019-04-01 ENCOUNTER — HOSPITAL ENCOUNTER (INPATIENT)
Facility: HOSPITAL | Age: 34
LOS: 2 days | Discharge: HOME | End: 2019-04-03
Attending: OBSTETRICS & GYNECOLOGY | Admitting: OBSTETRICS & GYNECOLOGY
Payer: COMMERCIAL

## 2019-04-01 DIAGNOSIS — O34.219 PREVIOUS CESAREAN SECTION COMPLICATING PREGNANCY: ICD-10-CM

## 2019-04-01 DIAGNOSIS — Z98.891 S/P CESAREAN SECTION: Primary | ICD-10-CM

## 2019-04-01 PROBLEM — Z3A.39 39 WEEKS GESTATION OF PREGNANCY: Status: ACTIVE | Noted: 2019-04-01

## 2019-04-01 LAB
ABO GROUP BLD: NORMAL
BASE EXCESS BLDCOA CALC-SCNC: -4.5 MMOL/L (ref 3–11)
BASE EXCESS BLDCOV CALC-SCNC: -3.1 MMOL/L (ref 1–9)
BLD GP AB SCN SERPL QL: NEGATIVE
ERYTHROCYTE [DISTWIDTH] IN BLOOD BY AUTOMATED COUNT: 13.6 % (ref 11.6–15.1)
HCO3 BLDCOA-SCNC: 21.8 MMOL/L (ref 17.3–27.3)
HCO3 BLDCOV-SCNC: 22 MMOL/L (ref 12.2–28.6)
HCT VFR BLD AUTO: 35.4 % (ref 34.8–46.1)
HGB BLD-MCNC: 11.5 G/DL (ref 11.5–15.4)
MCH RBC QN AUTO: 27.7 PG (ref 26.8–34.3)
MCHC RBC AUTO-ENTMCNC: 32.5 G/DL (ref 31.4–37.4)
MCV RBC AUTO: 85 FL (ref 82–98)
O2 CT VFR BLDCOA CALC: 13.5 ML/DL
OXYHGB MFR BLDCOA: 62.1 %
OXYHGB MFR BLDCOV: 64.4 %
PCO2 BLDCOA: 44.4 MM[HG] (ref 30–60)
PCO2 BLDCOV: 39.3 MM HG (ref 27–43)
PH BLDCOA: 7.31 [PH] (ref 7.23–7.43)
PH BLDCOV: 7.37 [PH] (ref 7.19–7.49)
PLATELET # BLD AUTO: 203 THOUSANDS/UL (ref 149–390)
PMV BLD AUTO: 11 FL (ref 8.9–12.7)
PO2 BLDCOA: 27.9 MM HG (ref 5–25)
PO2 BLDCOV: 27.2 MM HG (ref 15–45)
RBC # BLD AUTO: 4.15 MILLION/UL (ref 3.81–5.12)
RH BLD: NEGATIVE
SAO2 % BLDCOV: 13.2 ML/DL
SPECIMEN EXPIRATION DATE: NORMAL
WBC # BLD AUTO: 9.08 THOUSAND/UL (ref 4.31–10.16)

## 2019-04-01 PROCEDURE — 59510 CESAREAN DELIVERY: CPT | Performed by: OBSTETRICS & GYNECOLOGY

## 2019-04-01 PROCEDURE — 82805 BLOOD GASES W/O2 SATURATION: CPT | Performed by: OBSTETRICS & GYNECOLOGY

## 2019-04-01 PROCEDURE — 85027 COMPLETE CBC AUTOMATED: CPT | Performed by: STUDENT IN AN ORGANIZED HEALTH CARE EDUCATION/TRAINING PROGRAM

## 2019-04-01 PROCEDURE — 86900 BLOOD TYPING SEROLOGIC ABO: CPT | Performed by: STUDENT IN AN ORGANIZED HEALTH CARE EDUCATION/TRAINING PROGRAM

## 2019-04-01 PROCEDURE — 86592 SYPHILIS TEST NON-TREP QUAL: CPT | Performed by: STUDENT IN AN ORGANIZED HEALTH CARE EDUCATION/TRAINING PROGRAM

## 2019-04-01 PROCEDURE — 86850 RBC ANTIBODY SCREEN: CPT | Performed by: STUDENT IN AN ORGANIZED HEALTH CARE EDUCATION/TRAINING PROGRAM

## 2019-04-01 PROCEDURE — 86901 BLOOD TYPING SEROLOGIC RH(D): CPT | Performed by: STUDENT IN AN ORGANIZED HEALTH CARE EDUCATION/TRAINING PROGRAM

## 2019-04-01 RX ORDER — DOCUSATE SODIUM 100 MG/1
100 CAPSULE, LIQUID FILLED ORAL 2 TIMES DAILY
Status: DISCONTINUED | OUTPATIENT
Start: 2019-04-01 | End: 2019-04-03 | Stop reason: HOSPADM

## 2019-04-01 RX ORDER — METOCLOPRAMIDE HYDROCHLORIDE 5 MG/ML
5 INJECTION INTRAMUSCULAR; INTRAVENOUS EVERY 6 HOURS PRN
Status: ACTIVE | OUTPATIENT
Start: 2019-04-01 | End: 2019-04-02

## 2019-04-01 RX ORDER — HYDROMORPHONE HCL/PF 1 MG/ML
0.5 SYRINGE (ML) INJECTION
Status: DISCONTINUED | OUTPATIENT
Start: 2019-04-01 | End: 2019-04-03 | Stop reason: HOSPADM

## 2019-04-01 RX ORDER — ACETAMINOPHEN 325 MG/1
650 TABLET ORAL EVERY 4 HOURS PRN
Status: DISCONTINUED | OUTPATIENT
Start: 2019-04-02 | End: 2019-04-03 | Stop reason: HOSPADM

## 2019-04-01 RX ORDER — CEFAZOLIN SODIUM 2 G/50ML
SOLUTION INTRAVENOUS AS NEEDED
Status: DISCONTINUED | OUTPATIENT
Start: 2019-04-01 | End: 2019-04-01 | Stop reason: SURG

## 2019-04-01 RX ORDER — NALOXONE HYDROCHLORIDE 0.4 MG/ML
0.1 INJECTION, SOLUTION INTRAMUSCULAR; INTRAVENOUS; SUBCUTANEOUS
Status: ACTIVE | OUTPATIENT
Start: 2019-04-01 | End: 2019-04-02

## 2019-04-01 RX ORDER — CEFAZOLIN SODIUM 2 G/50ML
2000 SOLUTION INTRAVENOUS ONCE
Status: DISCONTINUED | OUTPATIENT
Start: 2019-04-01 | End: 2019-04-01

## 2019-04-01 RX ORDER — FAMOTIDINE 20 MG/1
20 TABLET, FILM COATED ORAL DAILY
Status: DISCONTINUED | OUTPATIENT
Start: 2019-04-02 | End: 2019-04-03 | Stop reason: HOSPADM

## 2019-04-01 RX ORDER — CALCIUM CARBONATE 200(500)MG
1000 TABLET,CHEWABLE ORAL DAILY PRN
Status: DISCONTINUED | OUTPATIENT
Start: 2019-04-01 | End: 2019-04-03 | Stop reason: HOSPADM

## 2019-04-01 RX ORDER — METOCLOPRAMIDE HYDROCHLORIDE 5 MG/ML
INJECTION INTRAMUSCULAR; INTRAVENOUS AS NEEDED
Status: DISCONTINUED | OUTPATIENT
Start: 2019-04-01 | End: 2019-04-01 | Stop reason: SURG

## 2019-04-01 RX ORDER — PROPOFOL 10 MG/ML
INJECTION, EMULSION INTRAVENOUS AS NEEDED
Status: DISCONTINUED | OUTPATIENT
Start: 2019-04-01 | End: 2019-04-01 | Stop reason: SURG

## 2019-04-01 RX ORDER — OXYCODONE HYDROCHLORIDE AND ACETAMINOPHEN 5; 325 MG/1; MG/1
1 TABLET ORAL EVERY 4 HOURS PRN
Status: DISCONTINUED | OUTPATIENT
Start: 2019-04-02 | End: 2019-04-03 | Stop reason: HOSPADM

## 2019-04-01 RX ORDER — MAGNESIUM HYDROXIDE/ALUMINUM HYDROXICE/SIMETHICONE 120; 1200; 1200 MG/30ML; MG/30ML; MG/30ML
15 SUSPENSION ORAL EVERY 6 HOURS PRN
Status: DISCONTINUED | OUTPATIENT
Start: 2019-04-01 | End: 2019-04-03 | Stop reason: HOSPADM

## 2019-04-01 RX ORDER — SODIUM CHLORIDE, SODIUM LACTATE, POTASSIUM CHLORIDE, CALCIUM CHLORIDE 600; 310; 30; 20 MG/100ML; MG/100ML; MG/100ML; MG/100ML
125 INJECTION, SOLUTION INTRAVENOUS CONTINUOUS
Status: DISCONTINUED | OUTPATIENT
Start: 2019-04-01 | End: 2019-04-01

## 2019-04-01 RX ORDER — BISACODYL 10 MG
10 SUPPOSITORY, RECTAL RECTAL DAILY PRN
Status: DISCONTINUED | OUTPATIENT
Start: 2019-04-01 | End: 2019-04-03 | Stop reason: HOSPADM

## 2019-04-01 RX ORDER — ONDANSETRON 2 MG/ML
4 INJECTION INTRAMUSCULAR; INTRAVENOUS EVERY 4 HOURS PRN
Status: ACTIVE | OUTPATIENT
Start: 2019-04-01 | End: 2019-04-02

## 2019-04-01 RX ORDER — MORPHINE SULFATE 0.5 MG/ML
INJECTION, SOLUTION EPIDURAL; INTRATHECAL; INTRAVENOUS AS NEEDED
Status: DISCONTINUED | OUTPATIENT
Start: 2019-04-01 | End: 2019-04-01 | Stop reason: SURG

## 2019-04-01 RX ORDER — IBUPROFEN 600 MG/1
600 TABLET ORAL EVERY 6 HOURS PRN
Status: DISCONTINUED | OUTPATIENT
Start: 2019-04-01 | End: 2019-04-03 | Stop reason: HOSPADM

## 2019-04-01 RX ORDER — OXYTOCIN/RINGER'S LACTATE 30/500 ML
62.5 PLASTIC BAG, INJECTION (ML) INTRAVENOUS ONCE
Status: COMPLETED | OUTPATIENT
Start: 2019-04-01 | End: 2019-04-02

## 2019-04-01 RX ORDER — OXYCODONE HYDROCHLORIDE AND ACETAMINOPHEN 5; 325 MG/1; MG/1
2 TABLET ORAL EVERY 4 HOURS PRN
Status: DISCONTINUED | OUTPATIENT
Start: 2019-04-02 | End: 2019-04-03 | Stop reason: HOSPADM

## 2019-04-01 RX ORDER — NALBUPHINE HCL 10 MG/ML
5 AMPUL (ML) INJECTION
Status: ACTIVE | OUTPATIENT
Start: 2019-04-01 | End: 2019-04-02

## 2019-04-01 RX ORDER — SIMETHICONE 80 MG
80 TABLET,CHEWABLE ORAL 4 TIMES DAILY PRN
Status: DISCONTINUED | OUTPATIENT
Start: 2019-04-01 | End: 2019-04-03 | Stop reason: HOSPADM

## 2019-04-01 RX ORDER — DEXAMETHASONE SODIUM PHOSPHATE 4 MG/ML
INJECTION, SOLUTION INTRA-ARTICULAR; INTRALESIONAL; INTRAMUSCULAR; INTRAVENOUS; SOFT TISSUE AS NEEDED
Status: DISCONTINUED | OUTPATIENT
Start: 2019-04-01 | End: 2019-04-01 | Stop reason: SURG

## 2019-04-01 RX ORDER — DIPHENHYDRAMINE HYDROCHLORIDE 50 MG/ML
25 INJECTION INTRAMUSCULAR; INTRAVENOUS EVERY 6 HOURS PRN
Status: DISCONTINUED | OUTPATIENT
Start: 2019-04-01 | End: 2019-04-03 | Stop reason: HOSPADM

## 2019-04-01 RX ORDER — DEXAMETHASONE SODIUM PHOSPHATE 4 MG/ML
4 INJECTION, SOLUTION INTRA-ARTICULAR; INTRALESIONAL; INTRAMUSCULAR; INTRAVENOUS; SOFT TISSUE ONCE AS NEEDED
Status: ACTIVE | OUTPATIENT
Start: 2019-04-01 | End: 2019-04-02

## 2019-04-01 RX ORDER — SENNOSIDES 8.6 MG
1 TABLET ORAL DAILY
Status: DISCONTINUED | OUTPATIENT
Start: 2019-04-02 | End: 2019-04-03 | Stop reason: HOSPADM

## 2019-04-01 RX ORDER — ONDANSETRON 2 MG/ML
4 INJECTION INTRAMUSCULAR; INTRAVENOUS EVERY 8 HOURS PRN
Status: DISCONTINUED | OUTPATIENT
Start: 2019-04-02 | End: 2019-04-03 | Stop reason: HOSPADM

## 2019-04-01 RX ORDER — OXYTOCIN/RINGER'S LACTATE 30/500 ML
1-30 PLASTIC BAG, INJECTION (ML) INTRAVENOUS
Status: DISCONTINUED | OUTPATIENT
Start: 2019-04-01 | End: 2019-04-03 | Stop reason: HOSPADM

## 2019-04-01 RX ORDER — OXYTOCIN/RINGER'S LACTATE 30/500 ML
PLASTIC BAG, INJECTION (ML) INTRAVENOUS CONTINUOUS PRN
Status: DISCONTINUED | OUTPATIENT
Start: 2019-04-01 | End: 2019-04-01 | Stop reason: SURG

## 2019-04-01 RX ORDER — ONDANSETRON 2 MG/ML
INJECTION INTRAMUSCULAR; INTRAVENOUS AS NEEDED
Status: DISCONTINUED | OUTPATIENT
Start: 2019-04-01 | End: 2019-04-01 | Stop reason: SURG

## 2019-04-01 RX ORDER — BUPIVACAINE HYDROCHLORIDE 7.5 MG/ML
INJECTION, SOLUTION INTRASPINAL AS NEEDED
Status: DISCONTINUED | OUTPATIENT
Start: 2019-04-01 | End: 2019-04-01 | Stop reason: SURG

## 2019-04-01 RX ORDER — KETOROLAC TROMETHAMINE 30 MG/ML
30 INJECTION, SOLUTION INTRAMUSCULAR; INTRAVENOUS EVERY 6 HOURS
Status: DISCONTINUED | OUTPATIENT
Start: 2019-04-01 | End: 2019-04-03 | Stop reason: HOSPADM

## 2019-04-01 RX ORDER — SODIUM CHLORIDE, SODIUM LACTATE, POTASSIUM CHLORIDE, CALCIUM CHLORIDE 600; 310; 30; 20 MG/100ML; MG/100ML; MG/100ML; MG/100ML
125 INJECTION, SOLUTION INTRAVENOUS CONTINUOUS
Status: DISCONTINUED | OUTPATIENT
Start: 2019-04-01 | End: 2019-04-03 | Stop reason: HOSPADM

## 2019-04-01 RX ADMIN — Medication 62.5 MILLI-UNITS/MIN: at 15:26

## 2019-04-01 RX ADMIN — DEXAMETHASONE SODIUM PHOSPHATE 4 MG: 4 INJECTION, SOLUTION INTRAMUSCULAR; INTRAVENOUS at 12:57

## 2019-04-01 RX ADMIN — METOCLOPRAMIDE 10 MG: 5 INJECTION, SOLUTION INTRAMUSCULAR; INTRAVENOUS at 12:49

## 2019-04-01 RX ADMIN — PROPOFOL 20 MG: 10 INJECTION, EMULSION INTRAVENOUS at 13:26

## 2019-04-01 RX ADMIN — SODIUM CHLORIDE, SODIUM LACTATE, POTASSIUM CHLORIDE, AND CALCIUM CHLORIDE 125 ML/HR: .6; .31; .03; .02 INJECTION, SOLUTION INTRAVENOUS at 12:07

## 2019-04-01 RX ADMIN — SODIUM CHLORIDE, SODIUM LACTATE, POTASSIUM CHLORIDE, AND CALCIUM CHLORIDE 125 ML/HR: .6; .31; .03; .02 INJECTION, SOLUTION INTRAVENOUS at 20:53

## 2019-04-01 RX ADMIN — SODIUM CHLORIDE, SODIUM LACTATE, POTASSIUM CHLORIDE, AND CALCIUM CHLORIDE: .6; .31; .03; .02 INJECTION, SOLUTION INTRAVENOUS at 12:37

## 2019-04-01 RX ADMIN — KETOROLAC TROMETHAMINE 30 MG: 30 INJECTION, SOLUTION INTRAMUSCULAR at 14:49

## 2019-04-01 RX ADMIN — CEFAZOLIN SODIUM 2000 MG: 2 SOLUTION INTRAVENOUS at 12:42

## 2019-04-01 RX ADMIN — PROPOFOL 20 MG: 10 INJECTION, EMULSION INTRAVENOUS at 13:31

## 2019-04-01 RX ADMIN — SODIUM CHLORIDE, SODIUM LACTATE, POTASSIUM CHLORIDE, AND CALCIUM CHLORIDE 125 ML/HR: .6; .31; .03; .02 INJECTION, SOLUTION INTRAVENOUS at 09:36

## 2019-04-01 RX ADMIN — PROPOFOL 20 MG: 10 INJECTION, EMULSION INTRAVENOUS at 13:28

## 2019-04-01 RX ADMIN — Medication 250 MILLI-UNITS/MIN: at 13:10

## 2019-04-01 RX ADMIN — HYDROMORPHONE HYDROCHLORIDE 0.5 MG: 1 INJECTION, SOLUTION INTRAMUSCULAR; INTRAVENOUS; SUBCUTANEOUS at 15:06

## 2019-04-01 RX ADMIN — BUPIVACAINE HYDROCHLORIDE IN DEXTROSE 1.5 ML: 7.5 INJECTION, SOLUTION SUBARACHNOID at 12:49

## 2019-04-01 RX ADMIN — PROPOFOL 20 MG: 10 INJECTION, EMULSION INTRAVENOUS at 13:24

## 2019-04-01 RX ADMIN — PHENYLEPHRINE HYDROCHLORIDE 50 MCG/MIN: 10 INJECTION INTRAVENOUS at 12:50

## 2019-04-01 RX ADMIN — KETOROLAC TROMETHAMINE 30 MG: 30 INJECTION, SOLUTION INTRAMUSCULAR at 20:36

## 2019-04-01 RX ADMIN — MORPHINE SULFATE 0.1 MG: 0.5 INJECTION, SOLUTION EPIDURAL; INTRATHECAL; INTRAVENOUS at 12:49

## 2019-04-01 RX ADMIN — DOCUSATE SODIUM 100 MG: 100 CAPSULE, LIQUID FILLED ORAL at 22:13

## 2019-04-01 RX ADMIN — SODIUM CHLORIDE, SODIUM LACTATE, POTASSIUM CHLORIDE, AND CALCIUM CHLORIDE 1000 ML: .6; .31; .03; .02 INJECTION, SOLUTION INTRAVENOUS at 08:56

## 2019-04-01 RX ADMIN — ONDANSETRON 4 MG: 2 INJECTION INTRAMUSCULAR; INTRAVENOUS at 12:57

## 2019-04-02 LAB
ABO GROUP BLD: NORMAL
BLD GP AB SCN SERPL QL: NEGATIVE
ERYTHROCYTE [DISTWIDTH] IN BLOOD BY AUTOMATED COUNT: 13.5 % (ref 11.6–15.1)
FETAL CELL SCN BLD QL ROSETTE: NEGATIVE
HCT VFR BLD AUTO: 29.4 % (ref 34.8–46.1)
HGB BLD-MCNC: 9.4 G/DL (ref 11.5–15.4)
MCH RBC QN AUTO: 27.7 PG (ref 26.8–34.3)
MCHC RBC AUTO-ENTMCNC: 32 G/DL (ref 31.4–37.4)
MCV RBC AUTO: 87 FL (ref 82–98)
PLATELET # BLD AUTO: 170 THOUSANDS/UL (ref 149–390)
PMV BLD AUTO: 11.2 FL (ref 8.9–12.7)
RBC # BLD AUTO: 3.39 MILLION/UL (ref 3.81–5.12)
RH BLD: NEGATIVE
RPR SER QL: NORMAL
WBC # BLD AUTO: 11.73 THOUSAND/UL (ref 4.31–10.16)

## 2019-04-02 PROCEDURE — 85461 HEMOGLOBIN FETAL: CPT | Performed by: STUDENT IN AN ORGANIZED HEALTH CARE EDUCATION/TRAINING PROGRAM

## 2019-04-02 PROCEDURE — 99024 POSTOP FOLLOW-UP VISIT: CPT | Performed by: OBSTETRICS & GYNECOLOGY

## 2019-04-02 PROCEDURE — 85027 COMPLETE CBC AUTOMATED: CPT | Performed by: STUDENT IN AN ORGANIZED HEALTH CARE EDUCATION/TRAINING PROGRAM

## 2019-04-02 PROCEDURE — 86850 RBC ANTIBODY SCREEN: CPT | Performed by: STUDENT IN AN ORGANIZED HEALTH CARE EDUCATION/TRAINING PROGRAM

## 2019-04-02 PROCEDURE — 86900 BLOOD TYPING SEROLOGIC ABO: CPT | Performed by: STUDENT IN AN ORGANIZED HEALTH CARE EDUCATION/TRAINING PROGRAM

## 2019-04-02 PROCEDURE — 86901 BLOOD TYPING SEROLOGIC RH(D): CPT | Performed by: STUDENT IN AN ORGANIZED HEALTH CARE EDUCATION/TRAINING PROGRAM

## 2019-04-02 RX ADMIN — BISACODYL 10 MG: 10 SUPPOSITORY RECTAL at 20:43

## 2019-04-02 RX ADMIN — LEVOTHYROXINE SODIUM 137 MCG: 112 TABLET ORAL at 05:44

## 2019-04-02 RX ADMIN — HYDROMORPHONE HYDROCHLORIDE 0.5 MG: 1 INJECTION, SOLUTION INTRAMUSCULAR; INTRAVENOUS; SUBCUTANEOUS at 04:26

## 2019-04-02 RX ADMIN — KETOROLAC TROMETHAMINE 30 MG: 30 INJECTION, SOLUTION INTRAMUSCULAR at 09:44

## 2019-04-02 RX ADMIN — SODIUM CHLORIDE, SODIUM LACTATE, POTASSIUM CHLORIDE, AND CALCIUM CHLORIDE 125 ML/HR: .6; .31; .03; .02 INJECTION, SOLUTION INTRAVENOUS at 04:29

## 2019-04-02 RX ADMIN — KETOROLAC TROMETHAMINE 30 MG: 30 INJECTION, SOLUTION INTRAMUSCULAR at 20:42

## 2019-04-02 RX ADMIN — SENNOSIDES 8.6 MG: 8.6 TABLET, FILM COATED ORAL at 09:43

## 2019-04-02 RX ADMIN — KETOROLAC TROMETHAMINE 30 MG: 30 INJECTION, SOLUTION INTRAMUSCULAR at 15:08

## 2019-04-02 RX ADMIN — DOCUSATE SODIUM 100 MG: 100 CAPSULE, LIQUID FILLED ORAL at 20:42

## 2019-04-02 RX ADMIN — KETOROLAC TROMETHAMINE 30 MG: 30 INJECTION, SOLUTION INTRAMUSCULAR at 02:43

## 2019-04-02 RX ADMIN — DOCUSATE SODIUM 100 MG: 100 CAPSULE, LIQUID FILLED ORAL at 09:44

## 2019-04-02 RX ADMIN — Medication 1 TABLET: at 09:43

## 2019-04-03 VITALS
HEART RATE: 94 BPM | SYSTOLIC BLOOD PRESSURE: 143 MMHG | DIASTOLIC BLOOD PRESSURE: 80 MMHG | WEIGHT: 246 LBS | RESPIRATION RATE: 18 BRPM | HEIGHT: 68 IN | OXYGEN SATURATION: 98 % | BODY MASS INDEX: 37.28 KG/M2 | TEMPERATURE: 98 F

## 2019-04-03 PROCEDURE — 99024 POSTOP FOLLOW-UP VISIT: CPT | Performed by: OBSTETRICS & GYNECOLOGY

## 2019-04-03 RX ORDER — IBUPROFEN 600 MG/1
600 TABLET ORAL EVERY 6 HOURS PRN
Qty: 30 TABLET | Refills: 0 | Status: SHIPPED | OUTPATIENT
Start: 2019-04-03 | End: 2019-05-02 | Stop reason: ALTCHOICE

## 2019-04-03 RX ORDER — OXYCODONE HYDROCHLORIDE AND ACETAMINOPHEN 5; 325 MG/1; MG/1
1 TABLET ORAL EVERY 6 HOURS PRN
Qty: 15 TABLET | Refills: 0 | Status: SHIPPED | OUTPATIENT
Start: 2019-04-03 | End: 2019-04-13

## 2019-04-03 RX ORDER — ACETAMINOPHEN 325 MG/1
650 TABLET ORAL EVERY 4 HOURS PRN
Qty: 30 TABLET | Refills: 0 | Status: SHIPPED | OUTPATIENT
Start: 2019-04-03 | End: 2019-05-02 | Stop reason: ALTCHOICE

## 2019-04-03 RX ORDER — SIMETHICONE 80 MG
80 TABLET,CHEWABLE ORAL 4 TIMES DAILY PRN
Qty: 30 TABLET | Refills: 0 | Status: SHIPPED | OUTPATIENT
Start: 2019-04-03 | End: 2019-05-02 | Stop reason: ALTCHOICE

## 2019-04-03 RX ORDER — DOCUSATE SODIUM 100 MG/1
100 CAPSULE, LIQUID FILLED ORAL 2 TIMES DAILY
Qty: 10 CAPSULE | Refills: 0 | Status: SHIPPED | OUTPATIENT
Start: 2019-04-03 | End: 2019-05-02 | Stop reason: ALTCHOICE

## 2019-04-03 RX ADMIN — KETOROLAC TROMETHAMINE 30 MG: 30 INJECTION, SOLUTION INTRAMUSCULAR at 02:03

## 2019-04-03 RX ADMIN — KETOROLAC TROMETHAMINE 30 MG: 30 INJECTION, SOLUTION INTRAMUSCULAR at 08:51

## 2019-04-03 RX ADMIN — Medication 1 TABLET: at 08:50

## 2019-04-03 RX ADMIN — HUMAN RHO(D) IMMUNE GLOBULIN 300 MCG: 300 INJECTION, SOLUTION INTRAMUSCULAR at 12:24

## 2019-04-03 RX ADMIN — SENNOSIDES 8.6 MG: 8.6 TABLET, FILM COATED ORAL at 08:50

## 2019-04-03 RX ADMIN — DOCUSATE SODIUM 100 MG: 100 CAPSULE, LIQUID FILLED ORAL at 08:50

## 2019-04-05 ENCOUNTER — TRANSITIONAL CARE MANAGEMENT (OUTPATIENT)
Dept: FAMILY MEDICINE CLINIC | Facility: CLINIC | Age: 34
End: 2019-04-05

## 2019-04-11 LAB — PLACENTA IN STORAGE: NORMAL

## 2019-04-16 ENCOUNTER — POSTPARTUM VISIT (OUTPATIENT)
Dept: OBGYN CLINIC | Facility: CLINIC | Age: 34
End: 2019-04-16

## 2019-04-16 VITALS — SYSTOLIC BLOOD PRESSURE: 122 MMHG | BODY MASS INDEX: 33.18 KG/M2 | WEIGHT: 218.2 LBS | DIASTOLIC BLOOD PRESSURE: 64 MMHG

## 2019-04-16 DIAGNOSIS — Z98.890 POST-OPERATIVE STATE: Primary | ICD-10-CM

## 2019-04-16 PROCEDURE — 99024 POSTOP FOLLOW-UP VISIT: CPT | Performed by: OBSTETRICS & GYNECOLOGY

## 2019-05-02 ENCOUNTER — POSTPARTUM VISIT (OUTPATIENT)
Dept: OBGYN CLINIC | Facility: CLINIC | Age: 34
End: 2019-05-02

## 2019-05-02 VITALS — WEIGHT: 215 LBS | DIASTOLIC BLOOD PRESSURE: 70 MMHG | BODY MASS INDEX: 32.69 KG/M2 | SYSTOLIC BLOOD PRESSURE: 102 MMHG

## 2019-05-02 PROBLEM — R82.71 GBS BACTERIURIA: Status: RESOLVED | Noted: 2018-11-30 | Resolved: 2019-05-02

## 2019-05-02 PROBLEM — O09.523 AMA (ADVANCED MATERNAL AGE) MULTIGRAVIDA 35+, THIRD TRIMESTER: Status: RESOLVED | Noted: 2018-10-03 | Resolved: 2019-05-02

## 2019-05-02 PROBLEM — O36.60X0 LGA (LARGE FOR GESTATIONAL AGE) FETUS AFFECTING MANAGEMENT OF MOTHER: Status: RESOLVED | Noted: 2019-02-27 | Resolved: 2019-05-02

## 2019-05-02 PROBLEM — Z87.42 HISTORY OF INFERTILITY: Status: RESOLVED | Noted: 2018-11-30 | Resolved: 2019-05-02

## 2019-05-02 PROBLEM — O40.3XX0 POLYHYDRAMNIOS AFFECTING PREGNANCY IN THIRD TRIMESTER: Status: RESOLVED | Noted: 2019-03-19 | Resolved: 2019-05-02

## 2019-05-02 PROBLEM — Z98.891 STATUS POST REPEAT LOW TRANSVERSE CESAREAN SECTION: Status: RESOLVED | Noted: 2019-04-01 | Resolved: 2019-05-02

## 2019-05-02 PROBLEM — E03.9 HYPOTHYROIDISM DURING PREGNANCY IN THIRD TRIMESTER: Status: RESOLVED | Noted: 2018-11-30 | Resolved: 2019-05-02

## 2019-05-02 PROBLEM — O99.283 HYPOTHYROIDISM DURING PREGNANCY IN THIRD TRIMESTER: Status: RESOLVED | Noted: 2018-11-30 | Resolved: 2019-05-02

## 2019-05-02 PROBLEM — Z98.891 HISTORY OF C-SECTION: Status: RESOLVED | Noted: 2018-10-03 | Resolved: 2019-05-02

## 2019-05-02 PROBLEM — Z3A.39 39 WEEKS GESTATION OF PREGNANCY: Status: RESOLVED | Noted: 2019-04-01 | Resolved: 2019-05-02

## 2019-05-02 PROCEDURE — 99024 POSTOP FOLLOW-UP VISIT: CPT | Performed by: OBSTETRICS & GYNECOLOGY

## 2019-07-17 ENCOUNTER — ANNUAL EXAM (OUTPATIENT)
Dept: OBGYN CLINIC | Facility: CLINIC | Age: 34
End: 2019-07-17
Payer: COMMERCIAL

## 2019-07-17 VITALS
HEIGHT: 68 IN | SYSTOLIC BLOOD PRESSURE: 124 MMHG | BODY MASS INDEX: 32.28 KG/M2 | WEIGHT: 213 LBS | DIASTOLIC BLOOD PRESSURE: 74 MMHG

## 2019-07-17 DIAGNOSIS — Z01.419 ENCOUNTER FOR GYNECOLOGICAL EXAMINATION WITHOUT ABNORMAL FINDING: Primary | ICD-10-CM

## 2019-07-17 PROCEDURE — 99395 PREV VISIT EST AGE 18-39: CPT | Performed by: OBSTETRICS & GYNECOLOGY

## 2019-07-17 NOTE — PROGRESS NOTES
Brook Shi  1985      CC:  Yearly exam    S:  26 y o  female here for yearly exam  Her cycles are regular, not heavy (but slightly heavier on day 1 than they used to be) or crampy  (She just stopped breastfeeding )  She is sexually active without pain, bleeding or dryness  She uses condoms and natural family planning for contraception  She is hoping for another baby but knows to wait at least 9-12 months prior to trying to conceive  We reviewed ASCCP guidelines for Pap testing today  Last Pap 10/3/2018 - normal/negative HPV      Current Outpatient Medications:     SYNTHROID 137 MCG tablet, Take one tab Mon-Sat and 2 tabs on Sunday Brand necessary, Disp: 102 tablet, Rfl: 3    Cholecalciferol (VITAMIN D3) 2000 units capsule, Take 2,000 Units by mouth daily  , Disp: , Rfl:     Multiple Vitamins-Minerals (MULTIVITAMIN ADULT PO), Take by mouth, Disp: , Rfl:   Social History     Socioeconomic History    Marital status: /Civil Union     Spouse name: Not on file    Number of children: Not on file    Years of education: Not on file    Highest education level: Not on file   Occupational History    Not on file   Social Needs    Financial resource strain: Not on file    Food insecurity:     Worry: Not on file     Inability: Not on file    Transportation needs:     Medical: Not on file     Non-medical: Not on file   Tobacco Use    Smoking status: Never Smoker    Smokeless tobacco: Never Used   Substance and Sexual Activity    Alcohol use:  Yes     Alcohol/week: 2 0 standard drinks     Types: 2 Cans of beer per week    Drug use: No    Sexual activity: Not Currently     Partners: Male     Birth control/protection: None   Lifestyle    Physical activity:     Days per week: Not on file     Minutes per session: Not on file    Stress: Not on file   Relationships    Social connections:     Talks on phone: Not on file     Gets together: Not on file     Attends Restoration service: Not on file Active member of club or organization: Not on file     Attends meetings of clubs or organizations: Not on file     Relationship status: Not on file    Intimate partner violence:     Fear of current or ex partner: Not on file     Emotionally abused: Not on file     Physically abused: Not on file     Forced sexual activity: Not on file   Other Topics Concern    Not on file   Social History Narrative    CAFFEINE USE     Family History   Problem Relation Age of Onset    Hypertension Mother     Multiple sclerosis Mother     Hashimoto's thyroiditis Mother     Factor V Leiden deficiency Mother     Celiac disease Sister     Thyroid disease Sister     Factor V Leiden deficiency Sister     Hashimoto's thyroiditis Brother     Lupus Brother     No Known Problems Father     Deep vein thrombosis Maternal Grandfather       Past Medical History:   Diagnosis Date    Abnormal Pap smear of cervix 2007    AMA (advanced maternal age) multigravida 33+     Female infertility     3 years of infertilty treatments; this is a spontaneous pregnancy    H/O Hashimoto thyroiditis     HPV (human papilloma virus) infection     In vitro fertilization     failed    Migraine     PVC (premature ventricular contraction)     daily had them intermittently    Thyroid nodule     Varicella     childhood    Vitiligo         Review of Systems   Respiratory: Negative  Cardiovascular: Negative  Gastrointestinal: Negative for constipation and diarrhea  Genitourinary: Negative for difficulty urinating, pelvic pain, vaginal bleeding, vaginal discharge, itching or odor  O:  Blood pressure 124/74, height 5' 8" (1 727 m), weight 96 6 kg (213 lb), last menstrual period 07/04/2019, currently breastfeeding  Patient appears well and is not in distress  Neck is supple without masses  Breasts are symmetrical without mass, tenderness, nipple discharge, skin changes or adenopathy   S/p bilateral breast reduction  Abdomen is soft and nontender without masses  External genitals are normal without lesions or rashes  Urethral meatus and urethra are normal  Bladder is normal to palpation  Vagina is normal without discharge or bleeding  Cervix is normal without discharge or lesion  Uterus is normal, mobile, nontender without palpable mass  Adnexa are normal, nontender, without palpable mass  A:  Yearly exam      P:   Pap due 2023   Patient will call with worsening bleeding   RTO one year for yearly exam or sooner as needed

## 2019-07-30 ENCOUNTER — OFFICE VISIT (OUTPATIENT)
Dept: ENDOCRINOLOGY | Facility: CLINIC | Age: 34
End: 2019-07-30
Payer: COMMERCIAL

## 2019-07-30 ENCOUNTER — LAB (OUTPATIENT)
Dept: LAB | Facility: CLINIC | Age: 34
End: 2019-07-30
Payer: COMMERCIAL

## 2019-07-30 VITALS
WEIGHT: 213.2 LBS | SYSTOLIC BLOOD PRESSURE: 110 MMHG | HEART RATE: 81 BPM | HEIGHT: 68 IN | DIASTOLIC BLOOD PRESSURE: 80 MMHG | BODY MASS INDEX: 32.31 KG/M2

## 2019-07-30 DIAGNOSIS — E03.8 HYPOTHYROIDISM DUE TO HASHIMOTO'S THYROIDITIS: ICD-10-CM

## 2019-07-30 DIAGNOSIS — E55.9 VITAMIN D DEFICIENCY: ICD-10-CM

## 2019-07-30 DIAGNOSIS — E06.3 HYPOTHYROIDISM DUE TO HASHIMOTO'S THYROIDITIS: ICD-10-CM

## 2019-07-30 DIAGNOSIS — E55.9 VITAMIN D DEFICIENCY: Primary | ICD-10-CM

## 2019-07-30 LAB
25(OH)D3 SERPL-MCNC: 21.6 NG/ML (ref 30–100)
T4 FREE SERPL-MCNC: 1.18 NG/DL (ref 0.76–1.46)
TSH SERPL DL<=0.05 MIU/L-ACNC: 0.49 UIU/ML (ref 0.36–3.74)

## 2019-07-30 PROCEDURE — 84439 ASSAY OF FREE THYROXINE: CPT

## 2019-07-30 PROCEDURE — 84443 ASSAY THYROID STIM HORMONE: CPT

## 2019-07-30 PROCEDURE — 36415 COLL VENOUS BLD VENIPUNCTURE: CPT

## 2019-07-30 PROCEDURE — 82306 VITAMIN D 25 HYDROXY: CPT

## 2019-07-30 PROCEDURE — 99214 OFFICE O/P EST MOD 30 MIN: CPT | Performed by: INTERNAL MEDICINE

## 2019-07-30 NOTE — ASSESSMENT & PLAN NOTE
Dose was decreased from 2 tablets on Sunday to 1 tablet daily after she delivered    Will repeat thyroid function test   Goal TSH between 1 and 2

## 2019-07-30 NOTE — PROGRESS NOTES
Leo Crane 37 y o  female MRN: [id number]    Encounter: [id number]      Assessment/Plan     Problem List Items Addressed This Visit        Endocrine    Hypothyroidism due to Hashimoto's thyroiditis     Dose was decreased from 2 tablets on Sunday to 1 tablet daily after she delivered  Will repeat thyroid function test   Goal TSH between 1 and 2         Relevant Orders    T4, free Lab Collect    TSH, 3rd generation Lab Collect       Other    Vitamin D deficiency - Primary     Start vitamin D3 2000 international units daily  Will check vitamin-D 25 hydroxy         Relevant Orders    Vitamin D 25 hydroxy Lab Collect        CC: Hypothyroidism      History of Present Illness     HPI:   78-year-old female with history of hypothyroidism secondary to Hashimoto thyroiditis and vitamin-D deficiency here for follow-up  She is currently on Synthroid 137 mcg daily and has been taking it regularly and properly  She is currently 4 months post partum and has been feeling well  Back to pre pregnancy weight     Has not been taking vitamin-D supplementations      Review of Systems   Constitutional: Negative for fatigue and unexpected weight change  Eyes: Negative for visual disturbance  Respiratory: Negative for cough and shortness of breath  Cardiovascular: Negative for palpitations and leg swelling  Gastrointestinal: Negative for constipation, diarrhea and nausea  Endocrine: Negative for polyuria  Musculoskeletal: Negative for gait problem  Skin: Negative for wound  Psychiatric/Behavioral: Negative for sleep disturbance  All other systems reviewed and are negative        Historical Information   Past Medical History:   Diagnosis Date    Abnormal Pap smear of cervix 2007    AMA (advanced maternal age) multigravida 33+     Female infertility     3 years of infertilty treatments; this is a spontaneous pregnancy    H/O Hashimoto thyroiditis     HPV (human papilloma virus) infection     In vitro fertilization     failed    Migraine     PVC (premature ventricular contraction)     daily had them intermittently    Thyroid nodule     Varicella     childhood    Vitiligo      Past Surgical History:   Procedure Laterality Date     SECTION       SECTION      COLPOSCOPY      DILATION AND CURETTAGE OF UTERUS      age 19 with TOP, and SAB in 2017    OPERATIVE HYSTEROSCOPY      removal of septum    [place]  DELIVERY ONLY N/A 2019    Procedure:  SECTION () REPEAT;  Surgeon: Anita Brooke MD;  Location: [facility];  Service: Obstetrics    REDUCTION MAMMAPLASTY       Social History   Social History     Substance and Sexual Activity   Alcohol Use Yes    Alcohol/week: 2 0 standard drinks    Types: 2 Cans of beer per week     Social History     Substance and Sexual Activity   Drug Use No     Social History     Tobacco Use   Smoking Status Never Smoker   Smokeless Tobacco Never Used     Family History:   Family History   Problem Relation Age of Onset    Hypertension Mother     Multiple sclerosis Mother     Hashimoto's thyroiditis Mother     Factor V Leiden deficiency Mother     Celiac disease Sister     Thyroid disease Sister     Factor V Leiden deficiency Sister     Hashimoto's thyroiditis Brother     Lupus Brother     No Known Problems Father     Deep vein thrombosis Maternal Grandfather        Meds/Allergies   Current Outpatient Medications   Medication Sig Dispense Refill    SYNTHROID 137 MCG tablet Take one tab Mon-Sat and 2 tabs on  Brand necessary (Patient taking differently: 137 mcg daily ) 102 tablet 3    Cholecalciferol (VITAMIN D3) 2000 units capsule Take 2,000 Units by mouth daily        Multiple Vitamins-Minerals (MULTIVITAMIN ADULT PO) Take by mouth       No current facility-administered medications for this visit        No Known Allergies    Objective   Vitals: Blood pressure 110/80, pulse 81, height 5' 8" (1 727 m), weight 96 7 kg (213 lb 3 2 oz), last menstrual period 07/04/2019, currently breastfeeding  Physical Exam   Constitutional: She is oriented to person, place, and time  She appears well-developed and well-nourished  No distress  HENT:   Head: Normocephalic and atraumatic  Eyes: EOM are normal    Neck: Normal range of motion  Neck supple  No thyromegaly present  Cardiovascular: Normal rate, regular rhythm and normal heart sounds  No murmur heard  Pulmonary/Chest: Effort normal and breath sounds normal  No stridor  No respiratory distress  She has no wheezes  She has no rales  Abdominal: Soft  Bowel sounds are normal  She exhibits no distension  There is no tenderness  There is no guarding  Musculoskeletal: Normal range of motion  She exhibits no edema or deformity  Lymphadenopathy:     She has no cervical adenopathy  Neurological: She is alert and oriented to person, place, and time  Skin: Skin is warm and dry  Psychiatric: She has a normal mood and affect  Her behavior is normal  Judgment and thought content normal        The history was obtained from the review of the chart, patient  Lab Results:   Lab Results   Component Value Date/Time    TSH 3RD GENERATON 1 928 02/07/2019 10:11 AM    TSH 3RD GENERATON 0 507 11/20/2018 09:05 AM    TSH 3RD GENERATON 0 918 09/10/2018 10:39 AM    Free T4 0 94 02/07/2019 10:11 AM    Free T4 0 98 11/20/2018 09:05 AM    Free T4 1 38 09/10/2018 10:39 AM       Imaging Studies:   Results for orders placed during the hospital encounter of 05/15/18   US thyroid    Impression 1  Heterogeneous thyroid gland without discrete nodules identified  Appearance most consistent with known Hashimoto's thyroiditis  2   Hypoechoic nodules posterior to the lower lobes likely represent lymph nodes  Workstation performed: [id number]         I have personally reviewed pertinent reports  Portions of the record may have been created with voice recognition software   Occasional wrong word or "sound a like" substitutions may have occurred due to the inherent limitations of voice recognition software  Read the chart carefully and recognize, using context, where substitutions have occurred

## 2019-08-01 ENCOUNTER — TELEPHONE (OUTPATIENT)
Dept: ENDOCRINOLOGY | Facility: CLINIC | Age: 34
End: 2019-08-01

## 2019-08-01 NOTE — RESULT ENCOUNTER NOTE
Please call the patient regarding labs - TSH normal-continue levothyroxine at current dose    Vitamin-D is low-she was recently started on vitamin D3 2000 international units daily-increase to 4000 international units daily

## 2019-08-01 NOTE — TELEPHONE ENCOUNTER
----- Message from Tanner Duarte MD sent at 8/1/2019 11:03 AM EDT -----  Please call the patient regarding labs - TSH normal-continue levothyroxine at current dose    Vitamin-D is low-she was recently started on vitamin D3 2000 international units daily-increase to 4000 international units daily

## 2019-08-28 ENCOUNTER — OFFICE VISIT (OUTPATIENT)
Dept: FAMILY MEDICINE CLINIC | Facility: CLINIC | Age: 34
End: 2019-08-28
Payer: COMMERCIAL

## 2019-08-28 VITALS
HEART RATE: 76 BPM | RESPIRATION RATE: 15 BRPM | BODY MASS INDEX: 31.15 KG/M2 | WEIGHT: 210.3 LBS | HEIGHT: 69 IN | SYSTOLIC BLOOD PRESSURE: 122 MMHG | DIASTOLIC BLOOD PRESSURE: 80 MMHG

## 2019-08-28 DIAGNOSIS — Z00.00 ANNUAL PHYSICAL EXAM: Primary | ICD-10-CM

## 2019-08-28 DIAGNOSIS — E66.9 OBESITY (BMI 30.0-34.9): ICD-10-CM

## 2019-08-28 PROCEDURE — 99395 PREV VISIT EST AGE 18-39: CPT | Performed by: PHYSICIAN ASSISTANT

## 2019-08-28 NOTE — PROGRESS NOTES
ADULT ANNUAL PHYSICAL  [facility] - [address] [facility]    NAME: Hector Ontiveros  AGE: 37 y o  SEX: female  : 1985     DATE: 2019     Assessment and Plan:     Healthy 37year old female 5 months post partum    Immunizations and preventive care screenings were discussed with patient today  Appropriate education was printed on patient's after visit summary  Counseling:  Alcohol/drug use: discussed moderation in alcohol intake and avoidance of illicit drug use  Dental Health: discussed importance of regular tooth brushing, flossing, and dental visits  Injury prevention: discussed safety/seat belts, safety helmets, smoke detectors, carbon dioxide detectors, and smoking near bedding or upholstery  · Sexual health: discussed sexually transmitted diseases, partner selection, use of condoms, avoidance of unintended pregnancy, and contraceptive alternatives  Return in 1 year (on 2020)  Chief Complaint:     Chief Complaint   Patient presents with    Physical Exam      History of Present Illness:     Adult Annual Physical   Patient here for a comprehensive physical exam  The patient reports no problems  Diet and Physical Activity  · Diet/Nutrition: well balanced diet  · Exercise: walking  Depression Screening  PHQ-9 Depression Screening    PHQ-9:    Frequency of the following problems over the past two weeks:       Little interest or pleasure in doing things:  0 - not at all  Feeling down, depressed, or hopeless:  0 - not at all  PHQ-2 Score:  0       General Health  · Sleep: sleeps well, gets 1-3 hours of sleep on average and has a new born baby boy  · Hearing: normal - bilateral   · Vision: no vision problems  · Dental: regular dental visits and brushes teeth twice daily  /GYN Health  · Last menstrual period: normal, regular  · Contraceptive method: none    · History of STDs?: no      Review of Systems:     Review of Systems   Constitutional: Negative  HENT: Negative  Eyes: Negative  Respiratory: Negative  Cardiovascular: Negative  Gastrointestinal: Negative  Endocrine: Negative  Genitourinary: Negative  Musculoskeletal: Negative  Skin: Negative  Allergic/Immunologic: Negative  Neurological: Negative  Hematological: Negative  Psychiatric/Behavioral: Negative  Past Medical History:     Past Medical History:   Diagnosis Date    Abnormal Pap smear of cervix     AMA (advanced maternal age) multigravida 33+     Female infertility     3 years of infertilty treatments; this is a spontaneous pregnancy    H/O Hashimoto thyroiditis     HPV (human papilloma virus) infection     In vitro fertilization     failed    Migraine     PVC (premature ventricular contraction)     daily had them intermittently    Thyroid nodule     Varicella     childhood    Vitiligo       Past Surgical History:     Past Surgical History:   Procedure Laterality Date     SECTION       SECTION      COLPOSCOPY      DILATION AND CURETTAGE OF UTERUS      age 19 with TOP, and SAB in 2017    OPERATIVE HYSTEROSCOPY      removal of septum    [place]  DELIVERY ONLY N/A 2019    Procedure:  SECTION () REPEAT;  Surgeon: Ailyn Hunter MD;  Location: [facility];  Service: Obstetrics    REDUCTION MAMMAPLASTY        Social History:     Social History     Socioeconomic History    Marital status: /Civil Union     Spouse name: None    Number of children: None    Years of education: None    Highest education level: None   Occupational History    None   Social Needs    Financial resource strain: None    Food insecurity:     Worry: None     Inability: None    Transportation needs:     Medical: None     Non-medical: None   Tobacco Use    Smoking status: Never Smoker    Smokeless tobacco: Never Used   Substance and Sexual Activity    Alcohol use:  Yes     Alcohol/week: 2 0 standard drinks     Types: 2 Cans of beer per week    Drug use: No    Sexual activity: Not Currently     Partners: Male     Birth control/protection: None   Lifestyle    Physical activity:     Days per week: None     Minutes per session: None    Stress: None   Relationships    Social connections:     Talks on phone: None     Gets together: None     Attends Nondenominational service: None     Active member of club or organization: None     Attends meetings of clubs or organizations: None     Relationship status: None    Intimate partner violence:     Fear of current or ex partner: None     Emotionally abused: None     Physically abused: None     Forced sexual activity: None   Other Topics Concern    None   Social History Narrative    CAFFEINE USE      Family History:     Family History   Problem Relation Age of Onset    Hypertension Mother     Multiple sclerosis Mother     Hashimoto's thyroiditis Mother     Factor V Leiden deficiency Mother     Celiac disease Sister     Thyroid disease Sister     Factor V Leiden deficiency Sister     Hashimoto's thyroiditis Brother     Lupus Brother     No Known Problems Father     Deep vein thrombosis Maternal Grandfather       Current Medications:     Current Outpatient Medications   Medication Sig Dispense Refill    Cholecalciferol (VITAMIN D3) 2000 units capsule Take 2,000 Units by mouth daily        Multiple Vitamins-Minerals (MULTIVITAMIN ADULT PO) Take by mouth      SYNTHROID 137 MCG tablet Take one tab Mon-Sat and 2 tabs on Sunday Brand necessary (Patient taking differently: 137 mcg daily ) 102 tablet 3     No current facility-administered medications for this visit         Allergies:     No Known Allergies   Physical Exam:     /80 (BP Location: Left arm, Patient Position: Sitting, Cuff Size: Large)   Pulse 76   Resp 15   Ht 5' 8 5" (1 74 m)   Wt 95 4 kg (210 lb 4 8 oz)   BMI 31 51 kg/m²     Physical Exam   Constitutional: She is oriented to person, place, and time  She appears well-developed and well-nourished  HENT:   Head: Normocephalic and atraumatic  Right Ear: External ear normal    Left Ear: External ear normal    Nose: Nose normal    Mouth/Throat: Oropharynx is clear and moist    Eyes: Pupils are equal, round, and reactive to light  Conjunctivae and EOM are normal    Neck: Normal range of motion  Neck supple  No thyromegaly present  Cardiovascular: Normal rate, regular rhythm, normal heart sounds and intact distal pulses  No murmur heard  Pulmonary/Chest: Effort normal and breath sounds normal  No respiratory distress  She has no wheezes  She has no rales  Abdominal: Soft  Bowel sounds are normal  She exhibits no distension and no mass  There is no tenderness  Musculoskeletal: Normal range of motion  She exhibits no edema  Lymphadenopathy:     She has no cervical adenopathy  Neurological: She is alert and oriented to person, place, and time  She has normal reflexes  No cranial nerve deficit  Skin: Skin is warm and dry  Psychiatric: She has a normal mood and affect  Judgment normal        Iron Lucia PA-C   [address]  BMI Counseling: Body mass index is 31 51 kg/m²  Discussed the patient's BMI with her  The BMI is above average  BMI counseling and education was provided to the patient  Nutrition recommendations include reducing portion sizes, decreasing overall calorie intake and 3-5 servings of fruits/vegetables daily  Exercise recommendations include moderate aerobic physical activity for 150 minutes/week

## 2019-08-28 NOTE — PATIENT INSTRUCTIONS

## 2019-10-25 ENCOUNTER — OFFICE VISIT (OUTPATIENT)
Dept: URGENT CARE | Facility: CLINIC | Age: 34
End: 2019-10-25
Payer: COMMERCIAL

## 2019-10-25 VITALS
WEIGHT: 213.7 LBS | DIASTOLIC BLOOD PRESSURE: 78 MMHG | RESPIRATION RATE: 18 BRPM | OXYGEN SATURATION: 98 % | HEART RATE: 100 BPM | SYSTOLIC BLOOD PRESSURE: 106 MMHG | HEIGHT: 68 IN | TEMPERATURE: 100.1 F | BODY MASS INDEX: 32.39 KG/M2

## 2019-10-25 DIAGNOSIS — J02.9 ACUTE PHARYNGITIS, UNSPECIFIED ETIOLOGY: Primary | ICD-10-CM

## 2019-10-25 PROCEDURE — 87880 STREP A ASSAY W/OPTIC: CPT | Performed by: PHYSICIAN ASSISTANT

## 2019-10-25 PROCEDURE — S9088 SERVICES PROVIDED IN URGENT: HCPCS | Performed by: PHYSICIAN ASSISTANT

## 2019-10-25 PROCEDURE — 87070 CULTURE OTHR SPECIMN AEROBIC: CPT | Performed by: PHYSICIAN ASSISTANT

## 2019-10-25 PROCEDURE — 99213 OFFICE O/P EST LOW 20 MIN: CPT | Performed by: PHYSICIAN ASSISTANT

## 2019-10-25 RX ORDER — PREDNISONE 20 MG/1
40 TABLET ORAL DAILY
Qty: 8 TABLET | Refills: 0 | Status: SHIPPED | OUTPATIENT
Start: 2019-10-25 | End: 2019-10-29

## 2019-10-25 NOTE — PROGRESS NOTES
Assessment/Plan    Acute pharyngitis, unspecified etiology [J02 9]  1  Acute pharyngitis, unspecified etiology  predniSONE 20 mg tablet    POCT rapid strepA    Throat culture         Subjective:     Patient ID: Kevin Israel is a 37 y o  female  Reason For Visit / Chief Complaint  Chief Complaint   Patient presents with    Sore Throat     began ; not improving and had low grade temp yesterday of 100 7  Awoke with laryngitis this am  Had strep as a child  76-year-old female presents the clinic with fever, sore throat that started Wednesday  Patient states that her symptoms are not improving and she had a low-grade temperature of 100 7° yesterday  Patient states that she woke up this morning with laryngitis and has pain with swallowing  Patient denies nasal congestion, rhinorrhea, cough, ear pain, headaches, sinus congestion  Patient states that she did have strep as a child and states that her sore throat pain is similar to what she had when she had strep throat        Past Medical History:   Diagnosis Date    Abnormal Pap smear of cervix     AMA (advanced maternal age) multigravida 33+     Female infertility     3 years of infertilty treatments; this is a spontaneous pregnancy    H/O Hashimoto thyroiditis     HPV (human papilloma virus) infection     In vitro fertilization     failed    Migraine     PVC (premature ventricular contraction)     daily had them intermittently    Thyroid nodule     Varicella     childhood    Vitiligo        Past Surgical History:   Procedure Laterality Date     SECTION       SECTION      COLPOSCOPY      DILATION AND CURETTAGE OF UTERUS      age 19 with TOP, and SAB in 2017    OPERATIVE HYSTEROSCOPY      removal of septum    [place]  DELIV[place] ONLY N/A 2019    Procedure:  SECTION () REPEAT;  Surgeon: Oriana Rico MD;  Location: [facility];  Service: Obstetrics    REDUCTION MAMMAPLASTY         Family History   Problem Relation Age of Onset    Hypertension Mother     Multiple sclerosis Mother     Hashimoto's thyroiditis Mother     Factor V Leiden deficiency Mother     Celiac disease Sister     Thyroid disease Sister     Factor V Leiden deficiency Sister     Hashimoto's thyroiditis Brother     Lupus Brother     No Known Problems Father     Deep vein thrombosis Maternal Grandfather        Review of Systems   Constitutional: Positive for fever  Negative for chills  HENT: Positive for sore throat and voice change  Negative for congestion and rhinorrhea  Respiratory: Negative for cough, chest tightness, shortness of breath and wheezing  Neurological: Negative for dizziness, light-headedness and headaches  Objective:    /78   Pulse 100   Temp 100 1 °F (37 8 °C)   Resp 18   Ht 5' 8" (1 727 m)   Wt 96 9 kg (213 lb 11 2 oz)   SpO2 98%   BMI 32 49 kg/m²     Physical Exam   Constitutional: She is oriented to person, place, and time  Vital signs are normal  She appears well-developed and well-nourished  No distress  HENT:   Head: Normocephalic and atraumatic  Nose: Nose normal    Mouth/Throat: Uvula is midline and mucous membranes are normal  Posterior oropharyngeal erythema present  Eyes: Conjunctivae are normal    Neck: Normal range of motion  Neck supple  Pulmonary/Chest: Effort normal    Musculoskeletal: Normal range of motion  Lymphadenopathy:        Head (right side): Tonsillar adenopathy present  Head (left side): Tonsillar adenopathy present  Neurological: She is alert and oriented to person, place, and time  Skin: Skin is warm and dry  No rash noted  She is not diaphoretic  Nursing note and vitals reviewed

## 2019-10-25 NOTE — PATIENT INSTRUCTIONS
Pharyngitis   AMBULATORY CARE:   Pharyngitis , or sore throat, is inflammation of the tissues and structures in your pharynx (throat)  Pharyngitis is most often caused by bacteria  It may also be caused by a cold or flu virus  Other causes include smoking, allergies, or acid reflux  Signs and symptoms that may occur with pharyngitis:   · Sore throat or pain when you swallow    · Fever, chills, and body aches    · Hoarse or raspy voice    · Cough, runny or stuffy nose, itchy or watery eyes    · Headache    · Upset stomach and loss of appetite    · Mild neck stiffness    · Swollen glands that feel like hard lumps when you touch your neck    · White and yellow pus-filled blisters in the back of your throat  Call 911 for any of the following:   · You have trouble breathing or swallowing because your throat is swollen or sore  Seek care immediately if:   · You are drooling because it hurts too much to swallow  · Your fever is higher than 102? F (39?C) or lasts longer than 3 days  · You are confused  · You taste blood in your throat  Contact your healthcare provider if:   · Your throat pain gets worse  · You have a painful lump in your throat that does not go away after 5 days  · Your symptoms do not improve after 5 days  · You have questions or concerns about your condition or care  Treatment for pharyngitis:  Viral pharyngitis will go away on its own without treatment  Your sore throat should start to feel better in 3 to 5 days for both viral and bacterial infections  You may need any of the following:  · Antibiotics  treat a bacterial infection  · NSAIDs , such as ibuprofen, help decrease swelling, pain, and fever  NSAIDs can cause stomach bleeding or kidney problems in certain people  If you take blood thinner medicine, always ask your healthcare provider if NSAIDs are safe for you  Always read the medicine label and follow directions  · Acetaminophen  decreases pain and fever   It is available without a doctor's order  Ask how much to take and how often to take it  Follow directions  Acetaminophen can cause liver damage if not taken correctly  Manage your symptoms:   · Gargle salt water  Mix ¼ teaspoon salt in an 8 ounce glass of warm water and gargle  This may help decrease swelling in your throat  · Drink liquids as directed  You may need to drink more liquids than usual  Liquids may help soothe your throat and prevent dehydration  Ask how much liquid to drink each day and which liquids are best for you  · Use a cool-steam humidifier  to help moisten the air in your room and calm your cough  · Soothe your throat  with cough drops, ice, soft foods, or popsicles  Prevent the spread of pharyngitis:  Cover your mouth and nose when you cough or sneeze  Do not share food or drinks  Wash your hands often  Use soap and water  If soap and water are unavailable, use an alcohol based hand   Follow up with your healthcare provider as directed:  Write down your questions so you remember to ask them during your visits  © 2017 [facility] Information is for End User's use only and may not be sold, redistributed or otherwise used for commercial purposes  All illustrations and images included in CareNotes® are the copyrighted property of [employer]  or [employer]  The above information is an  only  It is not intended as medical advice for individual conditions or treatments  Talk to your doctor, nurse or pharmacist before following any medical regimen to see if it is safe and effective for you

## 2019-10-27 LAB — BACTERIA THROAT CULT: NORMAL

## 2019-11-12 LAB — S PYO AG THROAT QL: NEGATIVE

## 2020-02-11 ENCOUNTER — OFFICE VISIT (OUTPATIENT)
Dept: ENDOCRINOLOGY | Facility: CLINIC | Age: 35
End: 2020-02-11

## 2020-02-11 VITALS
SYSTOLIC BLOOD PRESSURE: 104 MMHG | DIASTOLIC BLOOD PRESSURE: 76 MMHG | BODY MASS INDEX: 32.22 KG/M2 | HEIGHT: 68 IN | WEIGHT: 212.6 LBS | HEART RATE: 68 BPM

## 2020-02-11 DIAGNOSIS — E06.3 HYPOTHYROIDISM DUE TO HASHIMOTO'S THYROIDITIS: Primary | ICD-10-CM

## 2020-02-11 DIAGNOSIS — E55.9 VITAMIN D DEFICIENCY: ICD-10-CM

## 2020-02-11 DIAGNOSIS — E04.2 MULTIPLE THYROID NODULES: ICD-10-CM

## 2020-02-11 DIAGNOSIS — E03.8 HYPOTHYROIDISM DUE TO HASHIMOTO'S THYROIDITIS: Primary | ICD-10-CM

## 2020-02-11 PROCEDURE — 1036F TOBACCO NON-USER: CPT | Performed by: PHYSICIAN ASSISTANT

## 2020-02-11 PROCEDURE — 99214 OFFICE O/P EST MOD 30 MIN: CPT | Performed by: PHYSICIAN ASSISTANT

## 2020-02-11 NOTE — ASSESSMENT & PLAN NOTE
Continue Synthroid 137mcg daily  Check TSH/Free T4  Goal TSH <2 5  If she becomes pregnant before next visit she will let us know so we can closely monitor thyroid function and adjust synthroid if needed

## 2020-02-11 NOTE — PROGRESS NOTES
Established Patient Progress Note       Chief Complaint   Patient presents with    Hypothyroidism        History of Present Illness:     Corrine Perdomo is a 37 y o  female with a history of hypothyroidism due to hashimotos thyroiditis, Thyroid Nodule, and Vitamin D Deficiency  For Hypothyroidism, she is taking Synthroid 137mcg daily  Did not do well on generic levothyroxine  Feeling well in general   Planning for third pregnancy soon  She does have history of thyroid nodule which required FNA in  and abnormal/unusual FNA result lead to lymphoma workup which was negative  Most recent ultrasound showed no nodules       For Vitamin D Deficiency she is taking supplements         Patient Active Problem List   Diagnosis    Hypothyroidism due to Hashimoto's thyroiditis    Multiple thyroid nodules    Vitamin D deficiency    Vitiligo      Past Medical History:   Diagnosis Date    Abnormal Pap smear of cervix     AMA (advanced maternal age) multigravida 33+     Female infertility     3 years of infertilty treatments; this is a spontaneous pregnancy    H/O Hashimoto thyroiditis     HPV (human papilloma virus) infection     In vitro fertilization     failed    Migraine     PVC (premature ventricular contraction)     daily had them intermittently    Thyroid nodule     Varicella     childhood    Vitiligo       Past Surgical History:   Procedure Laterality Date     SECTION       SECTION      COLPOSCOPY      DILATION AND CURETTAGE OF UTERUS      age 19 with TOP, and SAB in 2017    OPERATIVE HYSTEROSCOPY      removal of septum    [place]  DELIVERY ONLY N/A 2019    Procedure:  SECTION () REPEAT;  Surgeon: Maris Wolf MD;  Location: [facility] ;  Service: Obstetrics    REDUCTION MAMMAPLASTY        Family History   Problem Relation Age of Onset    Hypertension Mother     Multiple sclerosis Mother     Hashimoto's thyroiditis Mother     Factor V Leiden deficiency Mother     Celiac disease Sister     Thyroid disease Sister     Factor V Leiden deficiency Sister     Hashimoto's thyroiditis Brother     Lupus Brother     No Known Problems Father     Deep vein thrombosis Maternal Grandfather      Social History     Tobacco Use    Smoking status: Never Smoker    Smokeless tobacco: Never Used   Substance Use Topics    Alcohol use: Yes     Alcohol/week: 2 0 standard drinks     Types: 2 Cans of beer per week     No Known Allergies    Current Outpatient Medications:     Cholecalciferol (VITAMIN D3) 2000 units capsule, Take 2,000 Units by mouth daily  , Disp: , Rfl:     Multiple Vitamins-Minerals (MULTIVITAMIN ADULT PO), Take by mouth, Disp: , Rfl:     SYNTHROID 137 MCG tablet, Take one tab Mon-Sat and 2 tabs on Sunday Brand necessary (Patient taking differently: 137 mcg daily ), Disp: 102 tablet, Rfl: 3    Review of Systems   Constitutional: Negative for activity change, appetite change, chills, diaphoresis, fatigue, fever and unexpected weight change  HENT: Negative for trouble swallowing and voice change  Eyes: Negative for visual disturbance  Respiratory: Negative for shortness of breath  Cardiovascular: Negative for chest pain and palpitations  Gastrointestinal: Negative for abdominal pain, constipation and diarrhea  Endocrine: Negative for cold intolerance, heat intolerance, polydipsia, polyphagia and polyuria  Genitourinary: Negative for frequency and menstrual problem  Musculoskeletal: Negative for arthralgias and myalgias  Skin: Negative for rash  Allergic/Immunologic: Negative for food allergies  Neurological: Negative for dizziness and tremors  Hematological: Negative for adenopathy  Psychiatric/Behavioral: Negative for sleep disturbance  All other systems reviewed and are negative  Physical Exam:  Body mass index is 32 33 kg/m²    /76   Pulse 68   Ht 5' 8" (1 727 m)   Wt 96 4 kg (212 lb 9 6 oz)   BMI 32 33 kg/m²    Wt Readings from Last 3 Encounters:   02/11/20 96 4 kg (212 lb 9 6 oz)   10/25/19 96 9 kg (213 lb 11 2 oz)   08/28/19 95 4 kg (210 lb 4 8 oz)       Physical Exam   Constitutional: She is oriented to person, place, and time  She appears well-developed and well-nourished  No distress  HENT:   Head: Normocephalic and atraumatic  Eyes: Pupils are equal, round, and reactive to light  Conjunctivae are normal    Neck: Normal range of motion  Neck supple  No thyromegaly present  Cardiovascular: Normal rate, regular rhythm and normal heart sounds  Pulmonary/Chest: Effort normal and breath sounds normal  No respiratory distress  She has no wheezes  She has no rales  Abdominal: Soft  Bowel sounds are normal  She exhibits no distension  There is no tenderness  Musculoskeletal: Normal range of motion  She exhibits no edema  Neurological: She is alert and oriented to person, place, and time  Skin: Skin is warm and dry  Psychiatric: She has a normal mood and affect  Vitals reviewed  Labs:   Component      Latest Ref Rng & Units 7/30/2019   Free T4      0 76 - 1 46 ng/dL 1 18   TSH 3RD GENERATON      0 358 - 3 740 uIU/mL 0 486   Vit D, 25-Hydroxy      30 0 - 100 0 ng/mL 21 6 (L)       Impression & Plan:    Problem List Items Addressed This Visit        Endocrine    Hypothyroidism due to Hashimoto's thyroiditis - Primary     Continue Synthroid 137mcg daily  Check TSH/Free T4  Goal TSH <2 5  If she becomes pregnant before next visit she will let us know so we can closely monitor thyroid function and adjust synthroid if needed  Relevant Orders    TSH, 3rd generation    T4, free    TSH, 3rd generation    T4, free    Multiple thyroid nodules     No nodules on 2018 ultrasound  No need for repeat ultrasound  Other    Vitamin D deficiency     Continue supplements check Vitamin D level            Relevant Orders    Vitamin D 25 hydroxy          Orders Placed This Encounter   Procedures  TSH, 3rd generation     This is a patient instruction: This test is non-fasting  Please drink two glasses of water morning of bloodwork  Standing Status:   Future     Standing Expiration Date:   8/11/2020    T4, free     Standing Status:   Future     Standing Expiration Date:   8/11/2020    Vitamin D 25 hydroxy     Standing Status:   Future     Standing Expiration Date:   8/11/2020    TSH, 3rd generation     This is a patient instruction: This test is non-fasting  Please drink two glasses of water morning of bloodwork  Standing Status:   Future     Standing Expiration Date:   2/11/2021    T4, free     Standing Status:   Future     Standing Expiration Date:   2/11/2021       There are no Patient Instructions on file for this visit  Discussed with the patient and all questioned fully answered  She will call me if any problems arise  Follow-up appointment in 6 months       Counseled patient on diagnostic results, prognosis, risk and benefit of treatment options, instruction for management, importance of treatment compliance, Risk  factor reduction and impressions      Jessica Faulkner PA-C

## 2020-03-10 DIAGNOSIS — Z34.90 PREGNANCY, UNSPECIFIED GESTATIONAL AGE: Primary | ICD-10-CM

## 2020-03-12 ENCOUNTER — APPOINTMENT (OUTPATIENT)
Dept: LAB | Facility: CLINIC | Age: 35
End: 2020-03-12
Payer: COMMERCIAL

## 2020-03-12 DIAGNOSIS — Z34.90 PREGNANCY, UNSPECIFIED GESTATIONAL AGE: ICD-10-CM

## 2020-03-12 DIAGNOSIS — E06.3 HYPOTHYROIDISM DUE TO HASHIMOTO'S THYROIDITIS: ICD-10-CM

## 2020-03-12 DIAGNOSIS — E03.8 HYPOTHYROIDISM DUE TO HASHIMOTO'S THYROIDITIS: ICD-10-CM

## 2020-03-12 DIAGNOSIS — E55.9 VITAMIN D DEFICIENCY: ICD-10-CM

## 2020-03-12 LAB
25(OH)D3 SERPL-MCNC: 28.2 NG/ML (ref 30–100)
B-HCG SERPL-ACNC: ABNORMAL MIU/ML
T4 FREE SERPL-MCNC: 1.29 NG/DL (ref 0.76–1.46)
TSH SERPL DL<=0.05 MIU/L-ACNC: 3.88 UIU/ML (ref 0.36–3.74)

## 2020-03-12 PROCEDURE — 84443 ASSAY THYROID STIM HORMONE: CPT

## 2020-03-12 PROCEDURE — 36415 COLL VENOUS BLD VENIPUNCTURE: CPT

## 2020-03-12 PROCEDURE — 82306 VITAMIN D 25 HYDROXY: CPT

## 2020-03-12 PROCEDURE — 84439 ASSAY OF FREE THYROXINE: CPT

## 2020-03-12 PROCEDURE — 84702 CHORIONIC GONADOTROPIN TEST: CPT

## 2020-03-14 DIAGNOSIS — E06.3 HYPOTHYROIDISM DUE TO HASHIMOTO'S THYROIDITIS: Primary | ICD-10-CM

## 2020-03-14 DIAGNOSIS — E03.8 HYPOTHYROIDISM DUE TO HASHIMOTO'S THYROIDITIS: Primary | ICD-10-CM

## 2020-03-30 ENCOUNTER — ULTRASOUND (OUTPATIENT)
Dept: OBGYN CLINIC | Facility: CLINIC | Age: 35
End: 2020-03-30
Payer: COMMERCIAL

## 2020-03-30 VITALS — BODY MASS INDEX: 31.81 KG/M2 | WEIGHT: 209.2 LBS | DIASTOLIC BLOOD PRESSURE: 78 MMHG | SYSTOLIC BLOOD PRESSURE: 114 MMHG

## 2020-03-30 DIAGNOSIS — N91.2 AMENORRHEA: Primary | ICD-10-CM

## 2020-04-01 ENCOUNTER — TELEPHONE (OUTPATIENT)
Dept: OBGYN CLINIC | Facility: CLINIC | Age: 35
End: 2020-04-01

## 2020-04-01 DIAGNOSIS — Z34.01 ENCOUNTER FOR SUPERVISION OF NORMAL FIRST PREGNANCY IN FIRST TRIMESTER: Primary | ICD-10-CM

## 2020-04-01 PROCEDURE — 76817 TRANSVAGINAL US OBSTETRIC: CPT | Performed by: OBSTETRICS & GYNECOLOGY

## 2020-04-06 ENCOUNTER — TELEPHONE (OUTPATIENT)
Dept: OBGYN CLINIC | Facility: CLINIC | Age: 35
End: 2020-04-06

## 2020-04-06 DIAGNOSIS — Z13.79 GENETIC SCREENING: Primary | ICD-10-CM

## 2020-04-09 ENCOUNTER — TELEMEDICINE (OUTPATIENT)
Dept: OBGYN CLINIC | Facility: CLINIC | Age: 35
End: 2020-04-09

## 2020-04-09 DIAGNOSIS — Z87.59 HISTORY OF DELIVERY OF MACROSOMAL INFANT: ICD-10-CM

## 2020-04-09 DIAGNOSIS — Z34.81 PRENATAL CARE, SUBSEQUENT PREGNANCY, FIRST TRIMESTER: Primary | ICD-10-CM

## 2020-04-09 PROCEDURE — OBC

## 2020-04-14 ENCOUNTER — APPOINTMENT (OUTPATIENT)
Dept: LAB | Facility: HOSPITAL | Age: 35
End: 2020-04-14
Payer: COMMERCIAL

## 2020-04-14 ENCOUNTER — TELEPHONE (OUTPATIENT)
Dept: ENDOCRINOLOGY | Facility: CLINIC | Age: 35
End: 2020-04-14

## 2020-04-14 DIAGNOSIS — E03.8 HYPOTHYROIDISM DUE TO HASHIMOTO'S THYROIDITIS: ICD-10-CM

## 2020-04-14 DIAGNOSIS — E06.3 HYPOTHYROIDISM DUE TO HASHIMOTO'S THYROIDITIS: ICD-10-CM

## 2020-04-14 DIAGNOSIS — E06.3 HYPOTHYROIDISM DUE TO HASHIMOTO'S THYROIDITIS: Primary | ICD-10-CM

## 2020-04-14 DIAGNOSIS — Z34.81 PRENATAL CARE, SUBSEQUENT PREGNANCY, FIRST TRIMESTER: ICD-10-CM

## 2020-04-14 DIAGNOSIS — E03.8 HYPOTHYROIDISM DUE TO HASHIMOTO'S THYROIDITIS: Primary | ICD-10-CM

## 2020-04-14 LAB
ABO GROUP BLD: NORMAL
BASOPHILS # BLD AUTO: 0.03 THOUSANDS/ΜL (ref 0–0.1)
BASOPHILS NFR BLD AUTO: 0 % (ref 0–1)
BLD GP AB SCN SERPL QL: NEGATIVE
EOSINOPHIL # BLD AUTO: 0.09 THOUSAND/ΜL (ref 0–0.61)
EOSINOPHIL NFR BLD AUTO: 1 % (ref 0–6)
ERYTHROCYTE [DISTWIDTH] IN BLOOD BY AUTOMATED COUNT: 12.9 % (ref 11.6–15.1)
HBV SURFACE AG SER QL: NORMAL
HCT VFR BLD AUTO: 44.3 % (ref 34.8–46.1)
HGB BLD-MCNC: 14.8 G/DL (ref 11.5–15.4)
IMM GRANULOCYTES # BLD AUTO: 0.02 THOUSAND/UL (ref 0–0.2)
IMM GRANULOCYTES NFR BLD AUTO: 0 % (ref 0–2)
LYMPHOCYTES # BLD AUTO: 2.06 THOUSANDS/ΜL (ref 0.6–4.47)
LYMPHOCYTES NFR BLD AUTO: 25 % (ref 14–44)
MCH RBC QN AUTO: 29 PG (ref 26.8–34.3)
MCHC RBC AUTO-ENTMCNC: 33.4 G/DL (ref 31.4–37.4)
MCV RBC AUTO: 87 FL (ref 82–98)
MONOCYTES # BLD AUTO: 0.45 THOUSAND/ΜL (ref 0.17–1.22)
MONOCYTES NFR BLD AUTO: 6 % (ref 4–12)
NEUTROPHILS # BLD AUTO: 5.51 THOUSANDS/ΜL (ref 1.85–7.62)
NEUTS SEG NFR BLD AUTO: 68 % (ref 43–75)
NRBC BLD AUTO-RTO: 0 /100 WBCS
PLATELET # BLD AUTO: 193 THOUSANDS/UL (ref 149–390)
PMV BLD AUTO: 10.8 FL (ref 8.9–12.7)
RBC # BLD AUTO: 5.1 MILLION/UL (ref 3.81–5.12)
RH BLD: NEGATIVE
RPR SER QL: NORMAL
RUBV IGG SERPL IA-ACNC: 22 IU/ML
T4 FREE SERPL-MCNC: 1.46 NG/DL (ref 0.76–1.46)
TSH SERPL DL<=0.05 MIU/L-ACNC: 1.77 UIU/ML (ref 0.36–3.74)
WBC # BLD AUTO: 8.16 THOUSAND/UL (ref 4.31–10.16)

## 2020-04-14 PROCEDURE — 36415 COLL VENOUS BLD VENIPUNCTURE: CPT

## 2020-04-14 PROCEDURE — 84443 ASSAY THYROID STIM HORMONE: CPT

## 2020-04-14 PROCEDURE — 80081 OBSTETRIC PANEL INC HIV TSTG: CPT

## 2020-04-14 PROCEDURE — 84439 ASSAY OF FREE THYROXINE: CPT

## 2020-04-15 LAB — HIV 1+2 AB+HIV1 P24 AG SERPL QL IA: NORMAL

## 2020-04-23 DIAGNOSIS — E06.3 HYPOTHYROIDISM DUE TO HASHIMOTO'S THYROIDITIS: ICD-10-CM

## 2020-04-23 DIAGNOSIS — E03.8 HYPOTHYROIDISM DUE TO HASHIMOTO'S THYROIDITIS: ICD-10-CM

## 2020-04-23 RX ORDER — LEVOTHYROXINE SODIUM 137 MCG
TABLET ORAL
Qty: 102 TABLET | Refills: 0 | Status: SHIPPED | OUTPATIENT
Start: 2020-04-23 | End: 2020-08-24 | Stop reason: SDUPTHER

## 2020-04-24 ENCOUNTER — TELEPHONE (OUTPATIENT)
Dept: PERINATAL CARE | Facility: CLINIC | Age: 35
End: 2020-04-24

## 2020-04-27 ENCOUNTER — ROUTINE PRENATAL (OUTPATIENT)
Dept: PERINATAL CARE | Facility: OTHER | Age: 35
End: 2020-04-27
Payer: COMMERCIAL

## 2020-04-27 ENCOUNTER — INITIAL PRENATAL (OUTPATIENT)
Dept: OBGYN CLINIC | Facility: CLINIC | Age: 35
End: 2020-04-27

## 2020-04-27 VITALS — DIASTOLIC BLOOD PRESSURE: 72 MMHG | WEIGHT: 208 LBS | SYSTOLIC BLOOD PRESSURE: 112 MMHG | BODY MASS INDEX: 31.63 KG/M2

## 2020-04-27 VITALS
DIASTOLIC BLOOD PRESSURE: 77 MMHG | TEMPERATURE: 97.4 F | BODY MASS INDEX: 31.52 KG/M2 | HEART RATE: 96 BPM | HEIGHT: 68 IN | SYSTOLIC BLOOD PRESSURE: 113 MMHG | WEIGHT: 208 LBS

## 2020-04-27 DIAGNOSIS — Z67.91 RH NEGATIVE STATE IN ANTEPARTUM PERIOD: ICD-10-CM

## 2020-04-27 DIAGNOSIS — Z34.90 ENCOUNTER FOR SUPERVISION OF NORMAL PREGNANCY, ANTEPARTUM, UNSPECIFIED GRAVIDITY: Primary | ICD-10-CM

## 2020-04-27 DIAGNOSIS — O09.899 SHORT INTERVAL BETWEEN PREGNANCIES AFFECTING PREGNANCY, ANTEPARTUM: ICD-10-CM

## 2020-04-27 DIAGNOSIS — O26.899 RH NEGATIVE STATE IN ANTEPARTUM PERIOD: ICD-10-CM

## 2020-04-27 DIAGNOSIS — K21.9 GASTROESOPHAGEAL REFLUX DISEASE WITHOUT ESOPHAGITIS: ICD-10-CM

## 2020-04-27 DIAGNOSIS — Z14.1 CYSTIC FIBROSIS CARRIER: ICD-10-CM

## 2020-04-27 DIAGNOSIS — O34.219 HISTORY OF CESAREAN DELIVERY, CURRENTLY PREGNANT: ICD-10-CM

## 2020-04-27 DIAGNOSIS — O99.280 HYPOTHYROIDISM AFFECTING PREGNANCY, ANTEPARTUM: ICD-10-CM

## 2020-04-27 DIAGNOSIS — O09.299 HX OF MACROSOMIA IN INFANT IN PRIOR PREGNANCY, CURRENTLY PREGNANT: ICD-10-CM

## 2020-04-27 DIAGNOSIS — O09.521 MULTIGRAVIDA OF ADVANCED MATERNAL AGE IN FIRST TRIMESTER: ICD-10-CM

## 2020-04-27 DIAGNOSIS — Z36.82 ENCOUNTER FOR (NT) NUCHAL TRANSLUCENCY SCAN: ICD-10-CM

## 2020-04-27 DIAGNOSIS — Z3A.12 12 WEEKS GESTATION OF PREGNANCY: Primary | ICD-10-CM

## 2020-04-27 DIAGNOSIS — Z3A.12 12 WEEKS GESTATION OF PREGNANCY: ICD-10-CM

## 2020-04-27 DIAGNOSIS — Z34.01 ENCOUNTER FOR SUPERVISION OF NORMAL FIRST PREGNANCY IN FIRST TRIMESTER: ICD-10-CM

## 2020-04-27 DIAGNOSIS — E03.9 HYPOTHYROIDISM AFFECTING PREGNANCY, ANTEPARTUM: ICD-10-CM

## 2020-04-27 PROBLEM — L80 VITILIGO: Status: RESOLVED | Noted: 2018-03-15 | Resolved: 2020-04-27

## 2020-04-27 PROBLEM — O99.210 OBESITY AFFECTING PREGNANCY: Status: RESOLVED | Noted: 2020-04-27 | Resolved: 2020-04-27

## 2020-04-27 PROBLEM — O09.529 ADVANCED MATERNAL AGE IN MULTIGRAVIDA: Status: ACTIVE | Noted: 2020-04-27

## 2020-04-27 PROBLEM — O99.210 OBESITY AFFECTING PREGNANCY: Status: ACTIVE | Noted: 2020-04-27

## 2020-04-27 LAB
SL AMB  POCT GLUCOSE, UA: NEGATIVE
SL AMB POCT URINE PROTEIN: NEGATIVE

## 2020-04-27 PROCEDURE — 76801 OB US < 14 WKS SINGLE FETUS: CPT | Performed by: OBSTETRICS & GYNECOLOGY

## 2020-04-27 PROCEDURE — 76813 OB US NUCHAL MEAS 1 GEST: CPT | Performed by: OBSTETRICS & GYNECOLOGY

## 2020-04-27 PROCEDURE — 87591 N.GONORRHOEAE DNA AMP PROB: CPT | Performed by: NURSE PRACTITIONER

## 2020-04-27 PROCEDURE — 87491 CHLMYD TRACH DNA AMP PROBE: CPT | Performed by: NURSE PRACTITIONER

## 2020-04-27 PROCEDURE — PNV: Performed by: NURSE PRACTITIONER

## 2020-04-27 PROCEDURE — 99241 PR OFFICE CONSULTATION NEW/ESTAB PATIENT 15 MIN: CPT | Performed by: OBSTETRICS & GYNECOLOGY

## 2020-04-28 ENCOUNTER — APPOINTMENT (OUTPATIENT)
Dept: LAB | Facility: HOSPITAL | Age: 35
End: 2020-04-28
Attending: STUDENT IN AN ORGANIZED HEALTH CARE EDUCATION/TRAINING PROGRAM
Payer: COMMERCIAL

## 2020-04-28 ENCOUNTER — TRANSCRIBE ORDERS (OUTPATIENT)
Dept: LAB | Facility: HOSPITAL | Age: 35
End: 2020-04-28

## 2020-04-28 DIAGNOSIS — Z87.59 HISTORY OF DELIVERY OF MACROSOMAL INFANT: ICD-10-CM

## 2020-04-28 DIAGNOSIS — O09.521 SUPERVISION OF ELDERLY MULTIGRAVIDA IN FIRST TRIMESTER: Primary | ICD-10-CM

## 2020-04-28 DIAGNOSIS — O09.521 SUPERVISION OF ELDERLY MULTIGRAVIDA IN FIRST TRIMESTER: ICD-10-CM

## 2020-04-28 DIAGNOSIS — Z34.81 PRENATAL CARE, SUBSEQUENT PREGNANCY, FIRST TRIMESTER: ICD-10-CM

## 2020-04-28 LAB
C TRACH DNA SPEC QL NAA+PROBE: NEGATIVE
GLUCOSE 1H P 50 G GLC PO SERPL-MCNC: 75 MG/DL
N GONORRHOEA DNA SPEC QL NAA+PROBE: NEGATIVE

## 2020-04-28 PROCEDURE — 36415 COLL VENOUS BLD VENIPUNCTURE: CPT

## 2020-04-28 PROCEDURE — 82950 GLUCOSE TEST: CPT

## 2020-04-30 LAB — MISCELLANEOUS LAB TEST RESULT: NORMAL

## 2020-05-20 ENCOUNTER — TELEPHONE (OUTPATIENT)
Dept: PERINATAL CARE | Facility: CLINIC | Age: 35
End: 2020-05-20

## 2020-05-26 ENCOUNTER — TELEMEDICINE (OUTPATIENT)
Dept: OBGYN CLINIC | Facility: CLINIC | Age: 35
End: 2020-05-26

## 2020-05-26 DIAGNOSIS — O09.899 SHORT INTERVAL BETWEEN PREGNANCIES AFFECTING PREGNANCY, ANTEPARTUM: Primary | ICD-10-CM

## 2020-05-26 DIAGNOSIS — O34.219 HISTORY OF CESAREAN DELIVERY, CURRENTLY PREGNANT: ICD-10-CM

## 2020-05-26 DIAGNOSIS — O26.899 RH NEGATIVE STATE IN ANTEPARTUM PERIOD: ICD-10-CM

## 2020-05-26 DIAGNOSIS — O09.299 HX OF MACROSOMIA IN INFANT IN PRIOR PREGNANCY, CURRENTLY PREGNANT: ICD-10-CM

## 2020-05-26 DIAGNOSIS — Z67.91 RH NEGATIVE STATE IN ANTEPARTUM PERIOD: ICD-10-CM

## 2020-05-26 PROCEDURE — PNV: Performed by: OBSTETRICS & GYNECOLOGY

## 2020-06-12 ENCOUNTER — TELEPHONE (OUTPATIENT)
Dept: OBGYN CLINIC | Facility: CLINIC | Age: 35
End: 2020-06-12

## 2020-06-19 ENCOUNTER — TELEPHONE (OUTPATIENT)
Dept: PERINATAL CARE | Facility: CLINIC | Age: 35
End: 2020-06-19

## 2020-06-22 ENCOUNTER — ROUTINE PRENATAL (OUTPATIENT)
Dept: PERINATAL CARE | Facility: OTHER | Age: 35
End: 2020-06-22
Payer: COMMERCIAL

## 2020-06-22 ENCOUNTER — ROUTINE PRENATAL (OUTPATIENT)
Dept: OBGYN CLINIC | Facility: CLINIC | Age: 35
End: 2020-06-22

## 2020-06-22 VITALS
SYSTOLIC BLOOD PRESSURE: 112 MMHG | HEIGHT: 68 IN | DIASTOLIC BLOOD PRESSURE: 76 MMHG | BODY MASS INDEX: 32.61 KG/M2 | WEIGHT: 215.2 LBS | HEART RATE: 91 BPM | TEMPERATURE: 98.1 F

## 2020-06-22 VITALS — SYSTOLIC BLOOD PRESSURE: 112 MMHG | BODY MASS INDEX: 32.72 KG/M2 | DIASTOLIC BLOOD PRESSURE: 64 MMHG | WEIGHT: 215.2 LBS

## 2020-06-22 DIAGNOSIS — Z3A.20 20 WEEKS GESTATION OF PREGNANCY: ICD-10-CM

## 2020-06-22 DIAGNOSIS — Z36.86 ENCOUNTER FOR ANTENATAL SCREENING FOR CERVICAL LENGTH: ICD-10-CM

## 2020-06-22 DIAGNOSIS — O09.522 MULTIGRAVIDA OF ADVANCED MATERNAL AGE IN SECOND TRIMESTER: Primary | ICD-10-CM

## 2020-06-22 DIAGNOSIS — Z34.82 PRENATAL CARE, SUBSEQUENT PREGNANCY, SECOND TRIMESTER: ICD-10-CM

## 2020-06-22 LAB
SL AMB  POCT GLUCOSE, UA: NORMAL
SL AMB POCT URINE PROTEIN: NORMAL

## 2020-06-22 PROCEDURE — PNV: Performed by: OBSTETRICS & GYNECOLOGY

## 2020-06-22 PROCEDURE — 1036F TOBACCO NON-USER: CPT | Performed by: OBSTETRICS & GYNECOLOGY

## 2020-06-22 PROCEDURE — 76817 TRANSVAGINAL US OBSTETRIC: CPT | Performed by: OBSTETRICS & GYNECOLOGY

## 2020-06-22 PROCEDURE — 76811 OB US DETAILED SNGL FETUS: CPT | Performed by: OBSTETRICS & GYNECOLOGY

## 2020-06-22 PROCEDURE — 99212 OFFICE O/P EST SF 10 MIN: CPT | Performed by: OBSTETRICS & GYNECOLOGY

## 2020-07-22 ENCOUNTER — ROUTINE PRENATAL (OUTPATIENT)
Dept: OBGYN CLINIC | Facility: CLINIC | Age: 35
End: 2020-07-22

## 2020-07-22 VITALS — WEIGHT: 217 LBS | SYSTOLIC BLOOD PRESSURE: 120 MMHG | BODY MASS INDEX: 32.99 KG/M2 | DIASTOLIC BLOOD PRESSURE: 64 MMHG

## 2020-07-22 DIAGNOSIS — O09.522 MULTIGRAVIDA OF ADVANCED MATERNAL AGE IN SECOND TRIMESTER: Primary | ICD-10-CM

## 2020-07-22 LAB
SL AMB  POCT GLUCOSE, UA: NEGATIVE
SL AMB POCT URINE PROTEIN: NEGATIVE

## 2020-07-22 PROCEDURE — PNV: Performed by: OBSTETRICS & GYNECOLOGY

## 2020-07-22 NOTE — PROGRESS NOTES
28 week labs given  GFM  Patient has a Spectra at home no need for another one  Tdap next visit  Will get flu shot once the new one comes out  She is not currently working  Discussed implications of COVID in pregnancy  She wishes to have a repeat  with me on 10/27 - scheduled at 8am   Discussed finding of anterior placenta, no evidence of previa but increased vascularity near cervix  She has follow up ultrasound in six weeks to follow up on this as well as some ? cliteromegaly  She had [id number] testing which showed a female fetus, and Dr Anthony Thornton told her that the finding of enlarged clitoris was borderline in his opinion  This will also get followed up on at her six week scan   labor and COVID precautions given

## 2020-07-31 ENCOUNTER — TELEPHONE (OUTPATIENT)
Dept: PERINATAL CARE | Facility: CLINIC | Age: 35
End: 2020-07-31

## 2020-07-31 NOTE — TELEPHONE ENCOUNTER
Attempted to reach patient by phone and left voicemail to confirm appointment for MFM ultrasound  1 support person ( must be over the age of 15) may accompany you for your appointment  If you or your support person have traveled outside the state in the past 2 weeks, please call and notify our office today #[phone]  You and your support person must wear a mask ,covering nose and mouth,during your entire visit  You and your support person will have temperature screened upon arrival     To minimize your exposure in our waiting room, please call our office prior to entering the building  Check in and rooming questions will be done via phone  We will give you directions when to enter for your appointment  Inside office # provided:  [place] line: [phone]  [facility] line:  [phone]  [facility] line:  [phone] line:  [phone]  Renetta Mckeon line:  [phone]  [place] line:  [phone]    IF you are not feeling well- cough, fever, shortness of breath or any flu like symptoms, contact your primary care physician or [address] [facility]    Any questions with these instructions please call Maternal Fetal Medicine nurse line today @ # [phone]

## 2020-08-03 ENCOUNTER — ULTRASOUND (OUTPATIENT)
Dept: PERINATAL CARE | Facility: OTHER | Age: 35
End: 2020-08-03
Payer: COMMERCIAL

## 2020-08-03 VITALS
HEIGHT: 68 IN | SYSTOLIC BLOOD PRESSURE: 123 MMHG | HEART RATE: 98 BPM | BODY MASS INDEX: 33.46 KG/M2 | TEMPERATURE: 97.6 F | WEIGHT: 220.8 LBS | DIASTOLIC BLOOD PRESSURE: 76 MMHG

## 2020-08-03 DIAGNOSIS — O43.102 PLACENTAL ABNORMALITY, SECOND TRIMESTER: ICD-10-CM

## 2020-08-03 DIAGNOSIS — Z3A.26 26 WEEKS GESTATION OF PREGNANCY: Primary | ICD-10-CM

## 2020-08-03 DIAGNOSIS — E03.9 HYPOTHYROIDISM DURING PREGNANCY IN SECOND TRIMESTER: ICD-10-CM

## 2020-08-03 DIAGNOSIS — O99.282 HYPOTHYROIDISM DURING PREGNANCY IN SECOND TRIMESTER: ICD-10-CM

## 2020-08-03 DIAGNOSIS — O09.522 MULTIGRAVIDA OF ADVANCED MATERNAL AGE IN SECOND TRIMESTER: ICD-10-CM

## 2020-08-03 PROCEDURE — 76817 TRANSVAGINAL US OBSTETRIC: CPT | Performed by: OBSTETRICS & GYNECOLOGY

## 2020-08-03 PROCEDURE — 76816 OB US FOLLOW-UP PER FETUS: CPT | Performed by: OBSTETRICS & GYNECOLOGY

## 2020-08-03 PROCEDURE — 99212 OFFICE O/P EST SF 10 MIN: CPT | Performed by: OBSTETRICS & GYNECOLOGY

## 2020-08-03 NOTE — PROGRESS NOTES
Please refer to the [facility] ultrasound report in Ob Procedures for additional information regarding today's visit

## 2020-08-03 NOTE — LETTER
August 3, 2020     Janett White, [address]    Patient: Jeanne Bragg   YOB: 1985   Date of Visit: 8/3/2020       Dear Dr Daljit Cantu: Thank you for referring Jeanne Bragg to me for evaluation  Below are my notes for this consultation  If you have questions, please do not hesitate to call me  I look forward to following your patient along with you  Sincerely,        Mercedes Hickey MD        CC: No Recipients  Mercedes Hickey MD  8/3/2020  6:56 PM  Sign when Signing Visit  Please refer to the [facility] ultrasound report in Ob Procedures for additional information regarding today's visit

## 2020-08-18 ENCOUNTER — ROUTINE PRENATAL (OUTPATIENT)
Dept: OBGYN CLINIC | Facility: CLINIC | Age: 35
End: 2020-08-18
Payer: COMMERCIAL

## 2020-08-18 VITALS — WEIGHT: 222.8 LBS | BODY MASS INDEX: 33.88 KG/M2 | DIASTOLIC BLOOD PRESSURE: 80 MMHG | SYSTOLIC BLOOD PRESSURE: 120 MMHG

## 2020-08-18 DIAGNOSIS — E04.2 MULTIPLE THYROID NODULES: ICD-10-CM

## 2020-08-18 DIAGNOSIS — O09.299 HX OF MACROSOMIA IN INFANT IN PRIOR PREGNANCY, CURRENTLY PREGNANT: ICD-10-CM

## 2020-08-18 DIAGNOSIS — Z67.91 RH NEGATIVE STATE IN ANTEPARTUM PERIOD: ICD-10-CM

## 2020-08-18 DIAGNOSIS — O26.899 RH NEGATIVE STATE IN ANTEPARTUM PERIOD: ICD-10-CM

## 2020-08-18 DIAGNOSIS — Z67.91 BLOOD TYPE, RH NEGATIVE: ICD-10-CM

## 2020-08-18 DIAGNOSIS — O09.523 AMA (ADVANCED MATERNAL AGE) MULTIGRAVIDA 35+, THIRD TRIMESTER: Primary | ICD-10-CM

## 2020-08-18 DIAGNOSIS — Z23 NEED FOR TDAP VACCINATION: ICD-10-CM

## 2020-08-18 DIAGNOSIS — O34.219 HISTORY OF CESAREAN DELIVERY, CURRENTLY PREGNANT: ICD-10-CM

## 2020-08-18 LAB
SL AMB  POCT GLUCOSE, UA: NORMAL
SL AMB POCT URINE PROTEIN: NORMAL

## 2020-08-18 PROCEDURE — PNV: Performed by: NURSE PRACTITIONER

## 2020-08-18 PROCEDURE — 90715 TDAP VACCINE 7 YRS/> IM: CPT

## 2020-08-18 PROCEDURE — 96372 THER/PROPH/DIAG INJ SC/IM: CPT

## 2020-08-18 PROCEDURE — 90471 IMMUNIZATION ADMIN: CPT

## 2020-08-18 PROCEDURE — 81002 URINALYSIS NONAUTO W/O SCOPE: CPT | Performed by: NURSE PRACTITIONER

## 2020-08-18 NOTE — PROGRESS NOTES
Problem List Items Addressed This Visit        Endocrine    Multiple thyroid nodules     Currently taking Synthroid 137mcg PO daily  Will repeat TSH with 28 week labs  Other    Rh negative state in antepartum period     Rhogam given today  Hx of macrosomia in infant in prior pregnancy, currently pregnant     Will have repeat growth scan in 3rd trimester  Early glucola normal  Still needs to complete 28 week labs  History of  delivery x2, currently pregnant     Repeat  scheduled with Dr Velasquez 10/27/20  AMA (advanced maternal age) multigravida 33+, third trimester - Primary     Denies OB complaints  Good fetal movement  Denies contractions, cramping, leakage of fluid or vaginal bleeding  Still needs to complete 28 week labs  Plans to do this on Friday  Tdap and Rhogam vaccines given  Reviewed  labor precautions, reasons to call, and FKCs  RTO in 2 weeks               Other Visit Diagnoses     Need for Tdap vaccination        Relevant Orders    TDAP Vaccine greater than or equal to 6yo (Completed)    POCT urine dip    Blood type, Rh negative        Relevant Orders    Rhogam Immune Globulin Immunization 300 mcg (1500 units) (Completed)

## 2020-08-18 NOTE — ASSESSMENT & PLAN NOTE
Will have repeat growth scan in 3rd trimester  Early glucola normal  Still needs to complete 28 week labs

## 2020-08-18 NOTE — PROGRESS NOTES
Patient is doing well; no complaints  GFM  FKMARIBELL, TDAP, MAYITO today  Has a breast pump at home from previous child

## 2020-08-18 NOTE — ASSESSMENT & PLAN NOTE
Denies OB complaints  Good fetal movement  Denies contractions, cramping, leakage of fluid or vaginal bleeding  Still needs to complete 28 week labs  Plans to do this on Friday  Tdap and Rhogam vaccines given  Reviewed  labor precautions, reasons to call, and FKCs  RTO in 2 weeks

## 2020-08-24 DIAGNOSIS — E06.3 HYPOTHYROIDISM DUE TO HASHIMOTO'S THYROIDITIS: ICD-10-CM

## 2020-08-24 DIAGNOSIS — E03.8 HYPOTHYROIDISM DUE TO HASHIMOTO'S THYROIDITIS: ICD-10-CM

## 2020-08-24 RX ORDER — LEVOTHYROXINE SODIUM 137 MCG
TABLET ORAL
Qty: 102 TABLET | Refills: 0 | Status: SHIPPED | OUTPATIENT
Start: 2020-08-24 | End: 2021-02-17 | Stop reason: SDUPTHER

## 2020-08-28 ENCOUNTER — APPOINTMENT (OUTPATIENT)
Dept: LAB | Facility: HOSPITAL | Age: 35
End: 2020-08-28
Payer: COMMERCIAL

## 2020-08-28 DIAGNOSIS — E03.8 HYPOTHYROIDISM DUE TO HASHIMOTO'S THYROIDITIS: ICD-10-CM

## 2020-08-28 DIAGNOSIS — O09.522 MULTIGRAVIDA OF ADVANCED MATERNAL AGE IN SECOND TRIMESTER: ICD-10-CM

## 2020-08-28 DIAGNOSIS — Z13.79 GENETIC SCREENING: ICD-10-CM

## 2020-08-28 DIAGNOSIS — E06.3 HYPOTHYROIDISM DUE TO HASHIMOTO'S THYROIDITIS: ICD-10-CM

## 2020-08-28 LAB
BASOPHILS # BLD AUTO: 0.02 THOUSANDS/ΜL (ref 0–0.1)
BASOPHILS NFR BLD AUTO: 0 % (ref 0–1)
EOSINOPHIL # BLD AUTO: 0.1 THOUSAND/ΜL (ref 0–0.61)
EOSINOPHIL NFR BLD AUTO: 1 % (ref 0–6)
ERYTHROCYTE [DISTWIDTH] IN BLOOD BY AUTOMATED COUNT: 12.9 % (ref 11.6–15.1)
GLUCOSE 1H P 50 G GLC PO SERPL-MCNC: 98 MG/DL
HCT VFR BLD AUTO: 37.4 % (ref 34.8–46.1)
HGB BLD-MCNC: 12.1 G/DL (ref 11.5–15.4)
IMM GRANULOCYTES # BLD AUTO: 0.05 THOUSAND/UL (ref 0–0.2)
IMM GRANULOCYTES NFR BLD AUTO: 1 % (ref 0–2)
LYMPHOCYTES # BLD AUTO: 1.89 THOUSANDS/ΜL (ref 0.6–4.47)
LYMPHOCYTES NFR BLD AUTO: 21 % (ref 14–44)
MCH RBC QN AUTO: 28.9 PG (ref 26.8–34.3)
MCHC RBC AUTO-ENTMCNC: 32.4 G/DL (ref 31.4–37.4)
MCV RBC AUTO: 90 FL (ref 82–98)
MONOCYTES # BLD AUTO: 0.57 THOUSAND/ΜL (ref 0.17–1.22)
MONOCYTES NFR BLD AUTO: 6 % (ref 4–12)
NEUTROPHILS # BLD AUTO: 6.44 THOUSANDS/ΜL (ref 1.85–7.62)
NEUTS SEG NFR BLD AUTO: 71 % (ref 43–75)
NRBC BLD AUTO-RTO: 0 /100 WBCS
PLATELET # BLD AUTO: 208 THOUSANDS/UL (ref 149–390)
PMV BLD AUTO: 10.7 FL (ref 8.9–12.7)
RBC # BLD AUTO: 4.18 MILLION/UL (ref 3.81–5.12)
RPR SER QL: NORMAL
T4 FREE SERPL-MCNC: 0.89 NG/DL (ref 0.76–1.46)
TSH SERPL DL<=0.05 MIU/L-ACNC: 1.17 UIU/ML (ref 0.36–3.74)
WBC # BLD AUTO: 9.07 THOUSAND/UL (ref 4.31–10.16)

## 2020-08-28 PROCEDURE — 86592 SYPHILIS TEST NON-TREP QUAL: CPT

## 2020-08-28 PROCEDURE — 85025 COMPLETE CBC W/AUTO DIFF WBC: CPT

## 2020-08-28 PROCEDURE — 84439 ASSAY OF FREE THYROXINE: CPT

## 2020-08-28 PROCEDURE — 84443 ASSAY THYROID STIM HORMONE: CPT

## 2020-08-28 PROCEDURE — 36415 COLL VENOUS BLD VENIPUNCTURE: CPT

## 2020-08-28 PROCEDURE — 82950 GLUCOSE TEST: CPT

## 2020-08-31 ENCOUNTER — TELEPHONE (OUTPATIENT)
Dept: ENDOCRINOLOGY | Facility: CLINIC | Age: 35
End: 2020-08-31

## 2020-08-31 ENCOUNTER — ROUTINE PRENATAL (OUTPATIENT)
Dept: OBGYN CLINIC | Facility: CLINIC | Age: 35
End: 2020-08-31

## 2020-08-31 VITALS — WEIGHT: 226 LBS | DIASTOLIC BLOOD PRESSURE: 64 MMHG | SYSTOLIC BLOOD PRESSURE: 114 MMHG | BODY MASS INDEX: 34.36 KG/M2

## 2020-08-31 DIAGNOSIS — O09.899 SHORT INTERVAL BETWEEN PREGNANCIES AFFECTING PREGNANCY, ANTEPARTUM: ICD-10-CM

## 2020-08-31 DIAGNOSIS — E04.2 MULTIPLE THYROID NODULES: ICD-10-CM

## 2020-08-31 DIAGNOSIS — E06.3 HYPOTHYROIDISM DUE TO HASHIMOTO'S THYROIDITIS: Primary | ICD-10-CM

## 2020-08-31 DIAGNOSIS — O34.219 HISTORY OF CESAREAN DELIVERY, CURRENTLY PREGNANT: ICD-10-CM

## 2020-08-31 DIAGNOSIS — E03.8 HYPOTHYROIDISM DUE TO HASHIMOTO'S THYROIDITIS: Primary | ICD-10-CM

## 2020-08-31 DIAGNOSIS — Z34.93 PREGNANCY, OBSTETRICAL CARE, THIRD TRIMESTER: ICD-10-CM

## 2020-08-31 DIAGNOSIS — O26.899 RH NEGATIVE STATE IN ANTEPARTUM PERIOD: ICD-10-CM

## 2020-08-31 DIAGNOSIS — O09.523 AMA (ADVANCED MATERNAL AGE) MULTIGRAVIDA 35+, THIRD TRIMESTER: Primary | ICD-10-CM

## 2020-08-31 DIAGNOSIS — O09.299 HX OF MACROSOMIA IN INFANT IN PRIOR PREGNANCY, CURRENTLY PREGNANT: ICD-10-CM

## 2020-08-31 DIAGNOSIS — Z67.91 RH NEGATIVE STATE IN ANTEPARTUM PERIOD: ICD-10-CM

## 2020-08-31 LAB
SL AMB  POCT GLUCOSE, UA: ABNORMAL
SL AMB POCT URINE PROTEIN: ABNORMAL

## 2020-08-31 PROCEDURE — PNV: Performed by: NURSE PRACTITIONER

## 2020-08-31 NOTE — TELEPHONE ENCOUNTER
----- Message from Wilma Forte PA-C sent at 8/28/2020  2:07 PM EDT -----  TSH at goal for pregnancy  If these are the second trimester labs, repeat TSH/Free T4 in 3rd trimester    Due for follow up with Dr Leyla Shaikh

## 2020-08-31 NOTE — PROGRESS NOTES
Problem List Items Addressed This Visit        Endocrine    Multiple thyroid nodules     Currently taking Synthroid 137mcg PO daily  TSH normal with 28 week labs  Other    Rh negative state in antepartum period     Rhogam given 20  Hx of macrosomia in infant in prior pregnancy, currently pregnant     28 week gtt normal  Has third trimester growth scan scheduled  History of  delivery x2, currently pregnant     Repeat  scheduled with Dr Velasquez 10/27/20  Short interval between pregnancies affecting pregnancy, antepartum    AMA (advanced maternal age) multigravida 35+, third trimester - Primary     Denies OB complaints  Good fetal movement  Denies contractions, cramping, leakage of fluid or vaginal bleeding  28 week labs normal   S/p Tdap/Rhogam vaccines  Reviewed  labor precautions, reasons to call, and FKCs  RTO in 2 weeks               Other Visit Diagnoses     Pregnancy, obstetrical care, third trimester        Relevant Orders    POCT urine dip (Completed)

## 2020-08-31 NOTE — ASSESSMENT & PLAN NOTE
Denies OB complaints  Good fetal movement  Denies contractions, cramping, leakage of fluid or vaginal bleeding  28 week labs normal   S/p Tdap/Rhogam vaccines  Reviewed  labor precautions, reasons to call, and FKCs  RTO in 2 weeks

## 2020-09-14 ENCOUNTER — ROUTINE PRENATAL (OUTPATIENT)
Dept: OBGYN CLINIC | Facility: CLINIC | Age: 35
End: 2020-09-14

## 2020-09-14 VITALS — SYSTOLIC BLOOD PRESSURE: 100 MMHG | BODY MASS INDEX: 34.56 KG/M2 | DIASTOLIC BLOOD PRESSURE: 64 MMHG | WEIGHT: 227.3 LBS

## 2020-09-14 DIAGNOSIS — E04.2 MULTIPLE THYROID NODULES: ICD-10-CM

## 2020-09-14 DIAGNOSIS — O34.219 HISTORY OF CESAREAN DELIVERY, CURRENTLY PREGNANT: ICD-10-CM

## 2020-09-14 DIAGNOSIS — Z34.93 PREGNANCY, OBSTETRICAL CARE, THIRD TRIMESTER: ICD-10-CM

## 2020-09-14 DIAGNOSIS — O09.523 AMA (ADVANCED MATERNAL AGE) MULTIGRAVIDA 35+, THIRD TRIMESTER: Primary | ICD-10-CM

## 2020-09-14 DIAGNOSIS — O26.899 RH NEGATIVE STATE IN ANTEPARTUM PERIOD: ICD-10-CM

## 2020-09-14 DIAGNOSIS — Z67.91 RH NEGATIVE STATE IN ANTEPARTUM PERIOD: ICD-10-CM

## 2020-09-14 DIAGNOSIS — O09.299 HX OF MACROSOMIA IN INFANT IN PRIOR PREGNANCY, CURRENTLY PREGNANT: ICD-10-CM

## 2020-09-14 LAB
SL AMB  POCT GLUCOSE, UA: NORMAL
SL AMB POCT URINE PROTEIN: NORMAL

## 2020-09-14 PROCEDURE — PNV: Performed by: NURSE PRACTITIONER

## 2020-09-14 NOTE — PROGRESS NOTES
Problem List Items Addressed This Visit        Endocrine    Multiple thyroid nodules     Continue with Synthroid 137mcg PO daily  TSH normal with 28 week labs  Other    Rh negative state in antepartum period     Rhogam given 20  Hx of macrosomia in infant in prior pregnancy, currently pregnant     28 week gtt normal  Has growth scan scheduled at 34 weeks  History of  delivery x2, currently pregnant     Repeat  scheduled with Dr Velasquez 10/27/20  Considering a tubal          AMA (advanced maternal age) multigravida 33+, third trimester - Primary     Denies OB complaints  Good fetal movement  Denies contractions, cramping, leakage of fluid or vaginal bleeding  S/p Tdap/Rhogam vaccines  Reviewed labor precautions, reasons to call, and FKCs  RTO in 2 weeks               Other Visit Diagnoses     Pregnancy, obstetrical care, third trimester        Relevant Orders    POCT urine dip (Completed)

## 2020-09-14 NOTE — ASSESSMENT & PLAN NOTE
Denies OB complaints  Good fetal movement  Denies contractions, cramping, leakage of fluid or vaginal bleeding  S/p Tdap/Rhogam vaccines  Reviewed labor precautions, reasons to call, and FKCs  RTO in 2 weeks

## 2020-09-21 NOTE — ASSESSMENT & PLAN NOTE
Hpi Title: Evaluation of Skin Lesions Plans for TOLAC if she goes into labor prior to her [facility], but if no spontaneous labor, then plan for repeat  on  - date changed on L&D for  1000 How Severe Are Your Spot(S)?: mild Family Member: Uncle

## 2020-09-25 ENCOUNTER — TELEPHONE (OUTPATIENT)
Dept: PERINATAL CARE | Facility: CLINIC | Age: 35
End: 2020-09-25

## 2020-09-25 NOTE — TELEPHONE ENCOUNTER
?  Attempted to reach patient by phone and left voicemail to confirm appointment for MFM ultrasound  1 support person ( must be over the age of 15) may accompany you for your appointment  Please wear masks ( [place] [facility])  You and your support person will be screened upon arrival   IF not feeling well- cough, fever, shortness of breath or any flu like symptoms contact your primary care physician or [address] [place]  ? Please call our office prior to entering the building  Check in and rooming questions will be done via phone  Inside office # provided:  ?    [facility] line: [phone]    ? [facility] does not allow cell phone use, recording device or streaming during ultrasound     Any questions with these instructions please call Maternal Fetal Medicine nurse line today @ # [phone]

## 2020-09-28 ENCOUNTER — ROUTINE PRENATAL (OUTPATIENT)
Dept: OBGYN CLINIC | Facility: CLINIC | Age: 35
End: 2020-09-28

## 2020-09-28 ENCOUNTER — ULTRASOUND (OUTPATIENT)
Dept: PERINATAL CARE | Facility: OTHER | Age: 35
End: 2020-09-28
Payer: COMMERCIAL

## 2020-09-28 VITALS
TEMPERATURE: 98 F | DIASTOLIC BLOOD PRESSURE: 78 MMHG | SYSTOLIC BLOOD PRESSURE: 120 MMHG | WEIGHT: 228.6 LBS | BODY MASS INDEX: 34.65 KG/M2 | HEART RATE: 84 BPM | HEIGHT: 68 IN

## 2020-09-28 VITALS — SYSTOLIC BLOOD PRESSURE: 126 MMHG | WEIGHT: 228.6 LBS | DIASTOLIC BLOOD PRESSURE: 80 MMHG | BODY MASS INDEX: 34.76 KG/M2

## 2020-09-28 DIAGNOSIS — Z3A.34 34 WEEKS GESTATION OF PREGNANCY: ICD-10-CM

## 2020-09-28 DIAGNOSIS — O09.523 AMA (ADVANCED MATERNAL AGE) MULTIGRAVIDA 35+, THIRD TRIMESTER: Primary | ICD-10-CM

## 2020-09-28 DIAGNOSIS — Z34.93 PREGNANCY, OBSTETRICAL CARE, THIRD TRIMESTER: Primary | ICD-10-CM

## 2020-09-28 DIAGNOSIS — O09.523 AMA (ADVANCED MATERNAL AGE) MULTIGRAVIDA 35+, THIRD TRIMESTER: ICD-10-CM

## 2020-09-28 LAB
SL AMB  POCT GLUCOSE, UA: NORMAL
SL AMB POCT URINE PROTEIN: NORMAL

## 2020-09-28 PROCEDURE — 76816 OB US FOLLOW-UP PER FETUS: CPT | Performed by: OBSTETRICS & GYNECOLOGY

## 2020-09-28 PROCEDURE — PNV: Performed by: OBSTETRICS & GYNECOLOGY

## 2020-09-28 NOTE — PROGRESS NOTES
Patient reports she is doing well; she does notice some swelling in her hands  Urine trace for protein neg for glucose  GFM  GBS next visit

## 2020-09-28 NOTE — PROGRESS NOTES
The patient was seen today for an ultrasound  Please see ultrasound report (located under Ob Procedures) for additional details  Thank you very much for allowing us to participate in the care of this very nice patient  Should you have any questions, please do not hesitate to contact me  Yusuf Mccloud MD [address]  Attending Physician, Christ[place]

## 2020-10-07 ENCOUNTER — ROUTINE PRENATAL (OUTPATIENT)
Dept: OBGYN CLINIC | Facility: CLINIC | Age: 35
End: 2020-10-07
Payer: COMMERCIAL

## 2020-10-07 VITALS — BODY MASS INDEX: 34.67 KG/M2 | SYSTOLIC BLOOD PRESSURE: 122 MMHG | DIASTOLIC BLOOD PRESSURE: 78 MMHG | WEIGHT: 228 LBS

## 2020-10-07 DIAGNOSIS — Z34.93 PREGNANCY, OBSTETRICAL CARE, THIRD TRIMESTER: Primary | ICD-10-CM

## 2020-10-07 DIAGNOSIS — Z23 NEED FOR INFLUENZA VACCINATION: ICD-10-CM

## 2020-10-07 LAB
SL AMB  POCT GLUCOSE, UA: NORMAL
SL AMB POCT URINE PROTEIN: NORMAL

## 2020-10-07 PROCEDURE — PNV: Performed by: OBSTETRICS & GYNECOLOGY

## 2020-10-07 PROCEDURE — 90471 IMMUNIZATION ADMIN: CPT | Performed by: OBSTETRICS & GYNECOLOGY

## 2020-10-07 PROCEDURE — 87653 STREP B DNA AMP PROBE: CPT | Performed by: OBSTETRICS & GYNECOLOGY

## 2020-10-07 PROCEDURE — 90686 IIV4 VACC NO PRSV 0.5 ML IM: CPT | Performed by: OBSTETRICS & GYNECOLOGY

## 2020-10-09 LAB — GP B STREP DNA SPEC QL NAA+PROBE: ABNORMAL

## 2020-10-13 ENCOUNTER — TELEPHONE (OUTPATIENT)
Dept: OBGYN CLINIC | Facility: CLINIC | Age: 35
End: 2020-10-13

## 2020-10-15 ENCOUNTER — ROUTINE PRENATAL (OUTPATIENT)
Dept: OBGYN CLINIC | Facility: CLINIC | Age: 35
End: 2020-10-15

## 2020-10-15 VITALS — WEIGHT: 228.4 LBS | SYSTOLIC BLOOD PRESSURE: 126 MMHG | DIASTOLIC BLOOD PRESSURE: 62 MMHG | BODY MASS INDEX: 34.73 KG/M2

## 2020-10-15 DIAGNOSIS — Z34.93 PREGNANCY, OBSTETRICAL CARE, THIRD TRIMESTER: Primary | ICD-10-CM

## 2020-10-15 LAB
SL AMB  POCT GLUCOSE, UA: NORMAL
SL AMB POCT URINE PROTEIN: NORMAL

## 2020-10-15 PROCEDURE — PNV: Performed by: OBSTETRICS & GYNECOLOGY

## 2020-10-19 ENCOUNTER — TELEPHONE (OUTPATIENT)
Dept: OBGYN CLINIC | Facility: CLINIC | Age: 35
End: 2020-10-19

## 2020-10-22 ENCOUNTER — ROUTINE PRENATAL (OUTPATIENT)
Dept: OBGYN CLINIC | Facility: CLINIC | Age: 35
End: 2020-10-22

## 2020-10-22 VITALS
BODY MASS INDEX: 34.52 KG/M2 | SYSTOLIC BLOOD PRESSURE: 112 MMHG | TEMPERATURE: 97.4 F | DIASTOLIC BLOOD PRESSURE: 68 MMHG | WEIGHT: 227 LBS

## 2020-10-22 DIAGNOSIS — Z34.93 ENCOUNTER FOR PREGNANCY RELATED EXAMINATION IN THIRD TRIMESTER: ICD-10-CM

## 2020-10-22 DIAGNOSIS — Z67.91 RH NEGATIVE STATE IN ANTEPARTUM PERIOD: ICD-10-CM

## 2020-10-22 DIAGNOSIS — O34.219 HISTORY OF CESAREAN DELIVERY, CURRENTLY PREGNANT: ICD-10-CM

## 2020-10-22 DIAGNOSIS — O09.523 AMA (ADVANCED MATERNAL AGE) MULTIGRAVIDA 35+, THIRD TRIMESTER: Primary | ICD-10-CM

## 2020-10-22 DIAGNOSIS — O26.899 RH NEGATIVE STATE IN ANTEPARTUM PERIOD: ICD-10-CM

## 2020-10-22 LAB
SL AMB  POCT GLUCOSE, UA: NEGATIVE
SL AMB POCT URINE PROTEIN: NEGATIVE

## 2020-10-22 PROCEDURE — PNV: Performed by: NURSE PRACTITIONER

## 2020-10-26 ENCOUNTER — TELEPHONE (OUTPATIENT)
Dept: OBGYN CLINIC | Facility: CLINIC | Age: 35
End: 2020-10-26

## 2020-10-26 PROCEDURE — 99024 POSTOP FOLLOW-UP VISIT: CPT | Performed by: OBSTETRICS & GYNECOLOGY

## 2020-10-27 ENCOUNTER — ANESTHESIA (INPATIENT)
Dept: LABOR AND DELIVERY | Facility: HOSPITAL | Age: 35
End: 2020-10-27
Payer: COMMERCIAL

## 2020-10-27 ENCOUNTER — ANESTHESIA EVENT (INPATIENT)
Dept: LABOR AND DELIVERY | Facility: HOSPITAL | Age: 35
End: 2020-10-27
Payer: COMMERCIAL

## 2020-10-27 ENCOUNTER — HOSPITAL ENCOUNTER (INPATIENT)
Facility: HOSPITAL | Age: 35
LOS: 2 days | Discharge: HOME | End: 2020-10-29
Attending: OBSTETRICS & GYNECOLOGY | Admitting: OBSTETRICS & GYNECOLOGY
Payer: COMMERCIAL

## 2020-10-27 VITALS — HEART RATE: 84 BPM

## 2020-10-27 DIAGNOSIS — R00.1 BRADYCARDIA: ICD-10-CM

## 2020-10-27 DIAGNOSIS — O34.219 PREVIOUS CESAREAN SECTION COMPLICATING PREGNANCY: ICD-10-CM

## 2020-10-27 DIAGNOSIS — I49.3 PVC (PREMATURE VENTRICULAR CONTRACTION): ICD-10-CM

## 2020-10-27 DIAGNOSIS — O34.219 HISTORY OF CESAREAN DELIVERY, CURRENTLY PREGNANT: ICD-10-CM

## 2020-10-27 DIAGNOSIS — Z98.891 S/P CESAREAN SECTION: Primary | ICD-10-CM

## 2020-10-27 PROBLEM — Z3A.39 39 WEEKS GESTATION OF PREGNANCY: Status: ACTIVE | Noted: 2020-10-27

## 2020-10-27 PROBLEM — Z90.79 STATUS POST BILATERAL SALPINGECTOMY: Status: ACTIVE | Noted: 2020-10-27

## 2020-10-27 LAB
ABO GROUP BLD: NORMAL
BASE EXCESS BLDCOA CALC-SCNC: -7.8 MMOL/L (ref 3–11)
BASE EXCESS BLDCOV CALC-SCNC: -3.1 MMOL/L (ref 1–9)
BASOPHILS # BLD AUTO: 0.03 THOUSANDS/ΜL (ref 0–0.1)
BASOPHILS NFR BLD AUTO: 0 % (ref 0–1)
BLD GP AB SCN SERPL QL: POSITIVE
BLOOD GROUP ANTIBODIES SERPL: NORMAL
BLOOD GROUP ANTIBODIES SERPL: NORMAL
C AG RBC QL: NEGATIVE
E AG RBC QL: NEGATIVE
EOSINOPHIL # BLD AUTO: 0.14 THOUSAND/ΜL (ref 0–0.61)
EOSINOPHIL NFR BLD AUTO: 2 % (ref 0–6)
ERYTHROCYTE [DISTWIDTH] IN BLOOD BY AUTOMATED COUNT: 13.4 % (ref 11.6–15.1)
HCO3 BLDCOA-SCNC: 18.3 MMOL/L (ref 17.3–27.3)
HCO3 BLDCOV-SCNC: 22.7 MMOL/L (ref 12.2–28.6)
HCT VFR BLD AUTO: 37.9 % (ref 34.8–46.1)
HGB BLD-MCNC: 12.3 G/DL (ref 11.5–15.4)
IMM GRANULOCYTES # BLD AUTO: 0.03 THOUSAND/UL (ref 0–0.2)
IMM GRANULOCYTES NFR BLD AUTO: 0 % (ref 0–2)
KELL GROUP AG RBC: NEGATIVE
LYMPHOCYTES # BLD AUTO: 2.39 THOUSANDS/ΜL (ref 0.6–4.47)
LYMPHOCYTES NFR BLD AUTO: 26 % (ref 14–44)
MCH RBC QN AUTO: 28.1 PG (ref 26.8–34.3)
MCHC RBC AUTO-ENTMCNC: 32.5 G/DL (ref 31.4–37.4)
MCV RBC AUTO: 87 FL (ref 82–98)
MONOCYTES # BLD AUTO: 0.6 THOUSAND/ΜL (ref 0.17–1.22)
MONOCYTES NFR BLD AUTO: 7 % (ref 4–12)
NEUTROPHILS # BLD AUTO: 6.05 THOUSANDS/ΜL (ref 1.85–7.62)
NEUTS SEG NFR BLD AUTO: 65 % (ref 43–75)
NRBC BLD AUTO-RTO: 0 /100 WBCS
O2 CT VFR BLDCOA CALC: 9.8 ML/DL
OXYHGB MFR BLDCOA: 43.9 %
OXYHGB MFR BLDCOV: 63.4 %
PCO2 BLDCOA: 39.5 MM[HG] (ref 30–60)
PCO2 BLDCOV: 43.1 MM HG (ref 27–43)
PH BLDCOA: 7.28 [PH] (ref 7.23–7.43)
PH BLDCOV: 7.34 [PH] (ref 7.19–7.49)
PLATELET # BLD AUTO: 182 THOUSANDS/UL (ref 149–390)
PMV BLD AUTO: 10.7 FL (ref 8.9–12.7)
PO2 BLDCOA: 22.1 MM HG (ref 5–25)
PO2 BLDCOV: 26.5 MM HG (ref 15–45)
RBC # BLD AUTO: 4.38 MILLION/UL (ref 3.81–5.12)
RH BLD: NEGATIVE
RPR SER QL: NORMAL
SAO2 % BLDCOV: 14.4 ML/DL
SPECIMEN EXPIRATION DATE: NORMAL
WBC # BLD AUTO: 9.24 THOUSAND/UL (ref 4.31–10.16)

## 2020-10-27 PROCEDURE — 85025 COMPLETE CBC W/AUTO DIFF WBC: CPT | Performed by: OBSTETRICS & GYNECOLOGY

## 2020-10-27 PROCEDURE — 58611 LIGATE OVIDUCT(S) ADD-ON: CPT | Performed by: OBSTETRICS & GYNECOLOGY

## 2020-10-27 PROCEDURE — 82805 BLOOD GASES W/O2 SATURATION: CPT | Performed by: OBSTETRICS & GYNECOLOGY

## 2020-10-27 PROCEDURE — 88302 TISSUE EXAM BY PATHOLOGIST: CPT | Performed by: PATHOLOGY

## 2020-10-27 PROCEDURE — 86901 BLOOD TYPING SEROLOGIC RH(D): CPT | Performed by: OBSTETRICS & GYNECOLOGY

## 2020-10-27 PROCEDURE — 4A1HXCZ MONITORING OF PRODUCTS OF CONCEPTION, CARDIAC RATE, EXTERNAL APPROACH: ICD-10-PCS | Performed by: OBSTETRICS & GYNECOLOGY

## 2020-10-27 PROCEDURE — 86870 RBC ANTIBODY IDENTIFICATION: CPT | Performed by: OBSTETRICS & GYNECOLOGY

## 2020-10-27 PROCEDURE — 86900 BLOOD TYPING SEROLOGIC ABO: CPT | Performed by: OBSTETRICS & GYNECOLOGY

## 2020-10-27 PROCEDURE — 86905 BLOOD TYPING RBC ANTIGENS: CPT

## 2020-10-27 PROCEDURE — 99024 POSTOP FOLLOW-UP VISIT: CPT | Performed by: STUDENT IN AN ORGANIZED HEALTH CARE EDUCATION/TRAINING PROGRAM

## 2020-10-27 PROCEDURE — 86592 SYPHILIS TEST NON-TREP QUAL: CPT | Performed by: OBSTETRICS & GYNECOLOGY

## 2020-10-27 PROCEDURE — 59510 CESAREAN DELIVERY: CPT | Performed by: OBSTETRICS & GYNECOLOGY

## 2020-10-27 PROCEDURE — 86850 RBC ANTIBODY SCREEN: CPT | Performed by: OBSTETRICS & GYNECOLOGY

## 2020-10-27 PROCEDURE — 0UT70ZZ RESECTION OF BILATERAL FALLOPIAN TUBES, OPEN APPROACH: ICD-10-PCS | Performed by: OBSTETRICS & GYNECOLOGY

## 2020-10-27 RX ORDER — KETOROLAC TROMETHAMINE 30 MG/ML
INJECTION, SOLUTION INTRAMUSCULAR; INTRAVENOUS
Status: COMPLETED
Start: 2020-10-27 | End: 2020-10-27

## 2020-10-27 RX ORDER — ONDANSETRON 2 MG/ML
4 INJECTION INTRAMUSCULAR; INTRAVENOUS EVERY 8 HOURS PRN
Status: DISCONTINUED | OUTPATIENT
Start: 2020-10-28 | End: 2020-10-29 | Stop reason: HOSPADM

## 2020-10-27 RX ORDER — SODIUM CHLORIDE, SODIUM LACTATE, POTASSIUM CHLORIDE, CALCIUM CHLORIDE 600; 310; 30; 20 MG/100ML; MG/100ML; MG/100ML; MG/100ML
125 INJECTION, SOLUTION INTRAVENOUS CONTINUOUS
Status: DISCONTINUED | OUTPATIENT
Start: 2020-10-27 | End: 2020-10-29 | Stop reason: HOSPADM

## 2020-10-27 RX ORDER — HYDROMORPHONE HCL/PF 1 MG/ML
0.5 SYRINGE (ML) INJECTION
Status: DISCONTINUED | OUTPATIENT
Start: 2020-10-27 | End: 2020-10-27

## 2020-10-27 RX ORDER — OXYTOCIN/RINGER'S LACTATE 30/500 ML
62.5 PLASTIC BAG, INJECTION (ML) INTRAVENOUS ONCE
Status: COMPLETED | OUTPATIENT
Start: 2020-10-27 | End: 2020-10-27

## 2020-10-27 RX ORDER — DEXAMETHASONE SODIUM PHOSPHATE 4 MG/ML
INJECTION, SOLUTION INTRA-ARTICULAR; INTRALESIONAL; INTRAMUSCULAR; INTRAVENOUS; SOFT TISSUE AS NEEDED
Status: DISCONTINUED | OUTPATIENT
Start: 2020-10-27 | End: 2020-10-27

## 2020-10-27 RX ORDER — DOCUSATE SODIUM 100 MG/1
100 CAPSULE, LIQUID FILLED ORAL 2 TIMES DAILY
Status: DISCONTINUED | OUTPATIENT
Start: 2020-10-27 | End: 2020-10-29 | Stop reason: HOSPADM

## 2020-10-27 RX ORDER — FENTANYL CITRATE/PF 50 MCG/ML
50 SYRINGE (ML) INJECTION
Status: DISCONTINUED | OUTPATIENT
Start: 2020-10-27 | End: 2020-10-27

## 2020-10-27 RX ORDER — CALCIUM CARBONATE 200(500)MG
1000 TABLET,CHEWABLE ORAL DAILY PRN
Status: DISCONTINUED | OUTPATIENT
Start: 2020-10-27 | End: 2020-10-29 | Stop reason: HOSPADM

## 2020-10-27 RX ORDER — KETOROLAC TROMETHAMINE 30 MG/ML
INJECTION, SOLUTION INTRAMUSCULAR; INTRAVENOUS AS NEEDED
Status: DISCONTINUED | OUTPATIENT
Start: 2020-10-27 | End: 2020-10-27

## 2020-10-27 RX ORDER — DEXAMETHASONE SODIUM PHOSPHATE 4 MG/ML
8 INJECTION, SOLUTION INTRA-ARTICULAR; INTRALESIONAL; INTRAMUSCULAR; INTRAVENOUS; SOFT TISSUE ONCE AS NEEDED
Status: DISPENSED | OUTPATIENT
Start: 2020-10-27 | End: 2020-10-28

## 2020-10-27 RX ORDER — HYDROMORPHONE HCL/PF 1 MG/ML
SYRINGE (ML) INJECTION AS NEEDED
Status: DISCONTINUED | OUTPATIENT
Start: 2020-10-27 | End: 2020-10-27

## 2020-10-27 RX ORDER — ACETAMINOPHEN 325 MG/1
650 TABLET ORAL EVERY 6 HOURS
Status: DISCONTINUED | OUTPATIENT
Start: 2020-10-27 | End: 2020-10-29 | Stop reason: HOSPADM

## 2020-10-27 RX ORDER — PROMETHAZINE HYDROCHLORIDE 25 MG/ML
25 INJECTION, SOLUTION INTRAMUSCULAR; INTRAVENOUS ONCE AS NEEDED
Status: DISCONTINUED | OUTPATIENT
Start: 2020-10-27 | End: 2020-10-27

## 2020-10-27 RX ORDER — NALOXONE HYDROCHLORIDE 0.4 MG/ML
0.1 INJECTION, SOLUTION INTRAMUSCULAR; INTRAVENOUS; SUBCUTANEOUS
Status: ACTIVE | OUTPATIENT
Start: 2020-10-27 | End: 2020-10-28

## 2020-10-27 RX ORDER — DIPHENHYDRAMINE HYDROCHLORIDE 50 MG/ML
25 INJECTION INTRAMUSCULAR; INTRAVENOUS EVERY 6 HOURS PRN
Status: ACTIVE | OUTPATIENT
Start: 2020-10-27 | End: 2020-10-28

## 2020-10-27 RX ORDER — KETOROLAC TROMETHAMINE 30 MG/ML
30 INJECTION, SOLUTION INTRAMUSCULAR; INTRAVENOUS EVERY 6 HOURS SCHEDULED
Status: DISCONTINUED | OUTPATIENT
Start: 2020-10-27 | End: 2020-10-28

## 2020-10-27 RX ORDER — ACETAMINOPHEN 325 MG/1
650 TABLET ORAL EVERY 4 HOURS PRN
Status: DISCONTINUED | OUTPATIENT
Start: 2020-10-27 | End: 2020-10-29 | Stop reason: HOSPADM

## 2020-10-27 RX ORDER — ONDANSETRON 2 MG/ML
INJECTION INTRAMUSCULAR; INTRAVENOUS AS NEEDED
Status: DISCONTINUED | OUTPATIENT
Start: 2020-10-27 | End: 2020-10-27

## 2020-10-27 RX ORDER — OXYCODONE HYDROCHLORIDE AND ACETAMINOPHEN 5; 325 MG/1; MG/1
1 TABLET ORAL EVERY 6 HOURS PRN
Status: DISCONTINUED | OUTPATIENT
Start: 2020-10-27 | End: 2020-10-29 | Stop reason: HOSPADM

## 2020-10-27 RX ORDER — ONDANSETRON 2 MG/ML
4 INJECTION INTRAMUSCULAR; INTRAVENOUS EVERY 8 HOURS PRN
Status: DISCONTINUED | OUTPATIENT
Start: 2020-10-27 | End: 2020-10-27

## 2020-10-27 RX ORDER — METOCLOPRAMIDE HYDROCHLORIDE 5 MG/ML
10 INJECTION INTRAMUSCULAR; INTRAVENOUS ONCE AS NEEDED
Status: DISCONTINUED | OUTPATIENT
Start: 2020-10-27 | End: 2020-10-27

## 2020-10-27 RX ORDER — ONDANSETRON 2 MG/ML
4 INJECTION INTRAMUSCULAR; INTRAVENOUS EVERY 4 HOURS PRN
Status: DISPENSED | OUTPATIENT
Start: 2020-10-27 | End: 2020-10-28

## 2020-10-27 RX ORDER — DIPHENHYDRAMINE HCL 25 MG
25 TABLET ORAL EVERY 6 HOURS PRN
Status: DISCONTINUED | OUTPATIENT
Start: 2020-10-28 | End: 2020-10-29 | Stop reason: HOSPADM

## 2020-10-27 RX ORDER — METOCLOPRAMIDE HYDROCHLORIDE 5 MG/ML
5 INJECTION INTRAMUSCULAR; INTRAVENOUS EVERY 6 HOURS PRN
Status: ACTIVE | OUTPATIENT
Start: 2020-10-27 | End: 2020-10-28

## 2020-10-27 RX ORDER — BUPIVACAINE HYDROCHLORIDE 7.5 MG/ML
INJECTION, SOLUTION INTRASPINAL AS NEEDED
Status: DISCONTINUED | OUTPATIENT
Start: 2020-10-27 | End: 2020-10-27

## 2020-10-27 RX ORDER — OXYTOCIN/RINGER'S LACTATE 30/500 ML
PLASTIC BAG, INJECTION (ML) INTRAVENOUS CONTINUOUS PRN
Status: DISCONTINUED | OUTPATIENT
Start: 2020-10-27 | End: 2020-10-27

## 2020-10-27 RX ADMIN — ONDANSETRON 4 MG: 2 INJECTION INTRAMUSCULAR; INTRAVENOUS at 08:58

## 2020-10-27 RX ADMIN — SODIUM CHLORIDE, SODIUM LACTATE, POTASSIUM CHLORIDE, AND CALCIUM CHLORIDE 125 ML/HR: .6; .31; .03; .02 INJECTION, SOLUTION INTRAVENOUS at 18:18

## 2020-10-27 RX ADMIN — Medication 62.5 MILLI-UNITS/MIN: at 11:30

## 2020-10-27 RX ADMIN — ONDANSETRON 4 MG: 2 INJECTION INTRAMUSCULAR; INTRAVENOUS at 13:54

## 2020-10-27 RX ADMIN — HYDROMORPHONE HYDROCHLORIDE 0.1 MG: 1 INJECTION, SOLUTION INTRAMUSCULAR; INTRAVENOUS; SUBCUTANEOUS at 08:50

## 2020-10-27 RX ADMIN — Medication 2000 MG: at 08:48

## 2020-10-27 RX ADMIN — ACETAMINOPHEN 650 MG: 325 TABLET, FILM COATED ORAL at 22:37

## 2020-10-27 RX ADMIN — PHENYLEPHRINE HYDROCHLORIDE 60 MCG/MIN: 10 INJECTION INTRAVENOUS at 08:52

## 2020-10-27 RX ADMIN — BUPIVACAINE HYDROCHLORIDE IN DEXTROSE 1.6 ML: 7.5 INJECTION, SOLUTION SUBARACHNOID at 08:50

## 2020-10-27 RX ADMIN — DEXAMETHASONE SODIUM PHOSPHATE 4 MG: 4 INJECTION, SOLUTION INTRAMUSCULAR; INTRAVENOUS at 08:58

## 2020-10-27 RX ADMIN — KETOROLAC TROMETHAMINE 30 MG: 30 INJECTION, SOLUTION INTRAMUSCULAR at 21:01

## 2020-10-27 RX ADMIN — PHENYLEPHRINE HYDROCHLORIDE 200 MCG: 10 INJECTION INTRAVENOUS at 08:54

## 2020-10-27 RX ADMIN — KETOROLAC TROMETHAMINE 30 MG: 30 INJECTION, SOLUTION INTRAMUSCULAR at 09:44

## 2020-10-27 RX ADMIN — ACETAMINOPHEN 650 MG: 325 TABLET, FILM COATED ORAL at 16:29

## 2020-10-27 RX ADMIN — SODIUM CHLORIDE, SODIUM LACTATE, POTASSIUM CHLORIDE, AND CALCIUM CHLORIDE 125 ML/HR: .6; .31; .03; .02 INJECTION, SOLUTION INTRAVENOUS at 11:31

## 2020-10-27 RX ADMIN — PHENYLEPHRINE HYDROCHLORIDE 200 MCG: 10 INJECTION INTRAVENOUS at 08:57

## 2020-10-27 RX ADMIN — KETOROLAC TROMETHAMINE 30 MG: 30 INJECTION, SOLUTION INTRAMUSCULAR at 14:59

## 2020-10-27 RX ADMIN — SODIUM CHLORIDE, SODIUM LACTATE, POTASSIUM CHLORIDE, AND CALCIUM CHLORIDE: .6; .31; .03; .02 INJECTION, SOLUTION INTRAVENOUS at 09:53

## 2020-10-27 RX ADMIN — SODIUM CHLORIDE, SODIUM LACTATE, POTASSIUM CHLORIDE, AND CALCIUM CHLORIDE 1000 ML: .6; .31; .03; .02 INJECTION, SOLUTION INTRAVENOUS at 07:01

## 2020-10-27 RX ADMIN — SODIUM CHLORIDE, SODIUM LACTATE, POTASSIUM CHLORIDE, AND CALCIUM CHLORIDE 125 ML/HR: .6; .31; .03; .02 INJECTION, SOLUTION INTRAVENOUS at 07:45

## 2020-10-27 RX ADMIN — METOCLOPRAMIDE 5 MG: 5 INJECTION, SOLUTION INTRAMUSCULAR; INTRAVENOUS at 11:32

## 2020-10-27 RX ADMIN — Medication 250 MILLI-UNITS/MIN: at 09:12

## 2020-10-27 RX ADMIN — DOCUSATE SODIUM 100 MG: 100 CAPSULE ORAL at 16:29

## 2020-10-27 RX ADMIN — DEXAMETHASONE SODIUM PHOSPHATE 8 MG: 4 INJECTION, SOLUTION INTRAMUSCULAR; INTRAVENOUS at 18:04

## 2020-10-28 LAB
ERYTHROCYTE [DISTWIDTH] IN BLOOD BY AUTOMATED COUNT: 13.5 % (ref 11.6–15.1)
HCT VFR BLD AUTO: 30.7 % (ref 34.8–46.1)
HGB BLD-MCNC: 9.9 G/DL (ref 11.5–15.4)
MCH RBC QN AUTO: 28.5 PG (ref 26.8–34.3)
MCHC RBC AUTO-ENTMCNC: 32.2 G/DL (ref 31.4–37.4)
MCV RBC AUTO: 89 FL (ref 82–98)
PLATELET # BLD AUTO: 158 THOUSANDS/UL (ref 149–390)
PMV BLD AUTO: 10.8 FL (ref 8.9–12.7)
RBC # BLD AUTO: 3.47 MILLION/UL (ref 3.81–5.12)
WBC # BLD AUTO: 12.49 THOUSAND/UL (ref 4.31–10.16)

## 2020-10-28 PROCEDURE — 85027 COMPLETE CBC AUTOMATED: CPT | Performed by: OBSTETRICS & GYNECOLOGY

## 2020-10-28 PROCEDURE — 99024 POSTOP FOLLOW-UP VISIT: CPT | Performed by: OBSTETRICS & GYNECOLOGY

## 2020-10-28 RX ORDER — SENNOSIDES 8.6 MG
1 TABLET ORAL
Status: DISCONTINUED | OUTPATIENT
Start: 2020-10-28 | End: 2020-10-29 | Stop reason: HOSPADM

## 2020-10-28 RX ORDER — IBUPROFEN 600 MG/1
600 TABLET ORAL EVERY 6 HOURS PRN
Status: DISCONTINUED | OUTPATIENT
Start: 2020-10-28 | End: 2020-10-29 | Stop reason: HOSPADM

## 2020-10-28 RX ORDER — OXYCODONE HYDROCHLORIDE 5 MG/1
5 TABLET ORAL EVERY 4 HOURS PRN
Status: DISCONTINUED | OUTPATIENT
Start: 2020-10-28 | End: 2020-10-29 | Stop reason: HOSPADM

## 2020-10-28 RX ORDER — IBUPROFEN 600 MG/1
600 TABLET ORAL EVERY 6 HOURS PRN
Status: DISCONTINUED | OUTPATIENT
Start: 2020-11-01 | End: 2020-10-28

## 2020-10-28 RX ORDER — OXYCODONE HYDROCHLORIDE 5 MG/1
10 TABLET ORAL EVERY 4 HOURS PRN
Status: DISCONTINUED | OUTPATIENT
Start: 2020-10-28 | End: 2020-10-29 | Stop reason: HOSPADM

## 2020-10-28 RX ADMIN — LEVOTHYROXINE SODIUM 137 MCG: 112 TABLET ORAL at 06:08

## 2020-10-28 RX ADMIN — DOCUSATE SODIUM 100 MG: 100 CAPSULE ORAL at 08:33

## 2020-10-28 RX ADMIN — ENOXAPARIN SODIUM 40 MG: 40 INJECTION SUBCUTANEOUS at 11:12

## 2020-10-28 RX ADMIN — KETOROLAC TROMETHAMINE 30 MG: 30 INJECTION, SOLUTION INTRAMUSCULAR at 06:08

## 2020-10-28 RX ADMIN — ACETAMINOPHEN 650 MG: 325 TABLET, FILM COATED ORAL at 23:27

## 2020-10-28 RX ADMIN — ACETAMINOPHEN 650 MG: 325 TABLET, FILM COATED ORAL at 04:54

## 2020-10-28 RX ADMIN — ACETAMINOPHEN 650 MG: 325 TABLET, FILM COATED ORAL at 17:26

## 2020-10-28 RX ADMIN — DOCUSATE SODIUM 100 MG: 100 CAPSULE ORAL at 17:26

## 2020-10-28 RX ADMIN — KETOROLAC TROMETHAMINE 30 MG: 30 INJECTION, SOLUTION INTRAMUSCULAR at 12:10

## 2020-10-28 RX ADMIN — IBUPROFEN 600 MG: 600 TABLET ORAL at 19:44

## 2020-10-28 RX ADMIN — ACETAMINOPHEN 650 MG: 325 TABLET, FILM COATED ORAL at 11:11

## 2020-10-28 RX ADMIN — SENNOSIDES 8.6 MG: 8.6 TABLET, FILM COATED ORAL at 21:11

## 2020-10-29 ENCOUNTER — TRANSITIONAL CARE MANAGEMENT (OUTPATIENT)
Dept: FAMILY MEDICINE CLINIC | Facility: CLINIC | Age: 35
End: 2020-10-29

## 2020-10-29 VITALS
BODY MASS INDEX: 34.4 KG/M2 | TEMPERATURE: 98.1 F | OXYGEN SATURATION: 98 % | WEIGHT: 227 LBS | HEIGHT: 68 IN | DIASTOLIC BLOOD PRESSURE: 93 MMHG | HEART RATE: 72 BPM | SYSTOLIC BLOOD PRESSURE: 136 MMHG | RESPIRATION RATE: 20 BRPM

## 2020-10-29 PROCEDURE — 99254 IP/OBS CNSLTJ NEW/EST MOD 60: CPT | Performed by: INTERNAL MEDICINE

## 2020-10-29 PROCEDURE — 99024 POSTOP FOLLOW-UP VISIT: CPT | Performed by: STUDENT IN AN ORGANIZED HEALTH CARE EDUCATION/TRAINING PROGRAM

## 2020-10-29 RX ORDER — ACETAMINOPHEN 325 MG/1
650 TABLET ORAL EVERY 4 HOURS PRN
Qty: 30 TABLET | Refills: 0 | Status: SHIPPED | OUTPATIENT
Start: 2020-10-29 | End: 2020-12-03 | Stop reason: ALTCHOICE

## 2020-10-29 RX ORDER — DOCUSATE SODIUM 100 MG/1
100 CAPSULE, LIQUID FILLED ORAL 2 TIMES DAILY
Qty: 10 CAPSULE | Refills: 0 | Status: CANCELLED | OUTPATIENT
Start: 2020-10-29

## 2020-10-29 RX ORDER — IBUPROFEN 600 MG/1
600 TABLET ORAL EVERY 6 HOURS PRN
Qty: 30 TABLET | Refills: 0 | Status: SHIPPED | OUTPATIENT
Start: 2020-10-29 | End: 2020-12-03 | Stop reason: ALTCHOICE

## 2020-10-29 RX ORDER — DOCUSATE SODIUM 100 MG/1
100 CAPSULE, LIQUID FILLED ORAL 2 TIMES DAILY
Qty: 10 CAPSULE | Refills: 0 | Status: SHIPPED | OUTPATIENT
Start: 2020-10-29 | End: 2020-12-03 | Stop reason: ALTCHOICE

## 2020-10-29 RX ORDER — ACETAMINOPHEN 325 MG/1
650 TABLET ORAL EVERY 6 HOURS
Qty: 30 TABLET | Refills: 0 | Status: CANCELLED | OUTPATIENT
Start: 2020-10-29

## 2020-10-29 RX ORDER — IBUPROFEN 600 MG/1
600 TABLET ORAL EVERY 6 HOURS PRN
Qty: 30 TABLET | Refills: 0 | Status: CANCELLED | OUTPATIENT
Start: 2020-10-29

## 2020-10-29 RX ORDER — OXYCODONE HYDROCHLORIDE 5 MG/1
5 TABLET ORAL EVERY 4 HOURS PRN
Qty: 5 TABLET | Refills: 0 | Status: SHIPPED | OUTPATIENT
Start: 2020-10-29 | End: 2020-11-08

## 2020-10-29 RX ORDER — OXYCODONE HYDROCHLORIDE AND ACETAMINOPHEN 5; 325 MG/1; MG/1
1 TABLET ORAL EVERY 6 HOURS PRN
Refills: 0 | Status: CANCELLED | OUTPATIENT
Start: 2020-10-29 | End: 2020-11-08

## 2020-10-29 RX ADMIN — DOCUSATE SODIUM 100 MG: 100 CAPSULE ORAL at 08:40

## 2020-10-29 RX ADMIN — ENOXAPARIN SODIUM 40 MG: 40 INJECTION SUBCUTANEOUS at 10:33

## 2020-10-29 RX ADMIN — IBUPROFEN 600 MG: 600 TABLET ORAL at 05:46

## 2020-10-29 RX ADMIN — LEVOTHYROXINE SODIUM 137 MCG: 112 TABLET ORAL at 05:47

## 2020-10-29 RX ADMIN — ACETAMINOPHEN 650 MG: 325 TABLET, FILM COATED ORAL at 05:46

## 2020-10-29 RX ADMIN — ACETAMINOPHEN 650 MG: 325 TABLET, FILM COATED ORAL at 12:04

## 2020-11-03 LAB — PLACENTA IN STORAGE: NORMAL

## 2020-11-17 ENCOUNTER — POSTPARTUM VISIT (OUTPATIENT)
Dept: OBGYN CLINIC | Facility: CLINIC | Age: 35
End: 2020-11-17

## 2020-11-17 VITALS
WEIGHT: 207.8 LBS | DIASTOLIC BLOOD PRESSURE: 64 MMHG | BODY MASS INDEX: 31.6 KG/M2 | SYSTOLIC BLOOD PRESSURE: 112 MMHG | TEMPERATURE: 97.6 F

## 2020-11-17 DIAGNOSIS — Z98.891 S/P CESAREAN SECTION: Primary | ICD-10-CM

## 2020-11-17 DIAGNOSIS — Z90.79 STATUS POST BILATERAL SALPINGECTOMY: ICD-10-CM

## 2020-11-17 PROCEDURE — 99024 POSTOP FOLLOW-UP VISIT: CPT | Performed by: OBSTETRICS & GYNECOLOGY

## 2020-12-03 ENCOUNTER — TELEMEDICINE (OUTPATIENT)
Dept: FAMILY MEDICINE CLINIC | Facility: CLINIC | Age: 35
End: 2020-12-03
Payer: COMMERCIAL

## 2020-12-03 VITALS — HEIGHT: 68 IN | WEIGHT: 207.8 LBS | BODY MASS INDEX: 31.49 KG/M2

## 2020-12-03 DIAGNOSIS — Z20.822 EXPOSURE TO COVID-19 VIRUS: Primary | ICD-10-CM

## 2020-12-03 PROBLEM — O09.523 AMA (ADVANCED MATERNAL AGE) MULTIGRAVIDA 35+, THIRD TRIMESTER: Status: RESOLVED | Noted: 2020-08-18 | Resolved: 2020-12-03

## 2020-12-03 PROBLEM — Z3A.39 39 WEEKS GESTATION OF PREGNANCY: Status: RESOLVED | Noted: 2020-10-27 | Resolved: 2020-12-03

## 2020-12-03 PROBLEM — Z34.90 ENCOUNTER FOR SUPERVISION OF NORMAL PREGNANCY, ANTEPARTUM: Status: RESOLVED | Noted: 2020-04-27 | Resolved: 2020-12-03

## 2020-12-03 PROBLEM — O34.219 HISTORY OF CESAREAN DELIVERY, CURRENTLY PREGNANT: Status: RESOLVED | Noted: 2020-04-27 | Resolved: 2020-12-03

## 2020-12-03 PROBLEM — O26.899 RH NEGATIVE STATE IN ANTEPARTUM PERIOD: Status: RESOLVED | Noted: 2020-04-27 | Resolved: 2020-12-03

## 2020-12-03 PROBLEM — O09.299 HX OF MACROSOMIA IN INFANT IN PRIOR PREGNANCY, CURRENTLY PREGNANT: Status: RESOLVED | Noted: 2020-04-27 | Resolved: 2020-12-03

## 2020-12-03 PROBLEM — O09.899 SHORT INTERVAL BETWEEN PREGNANCIES AFFECTING PREGNANCY, ANTEPARTUM: Status: RESOLVED | Noted: 2020-04-27 | Resolved: 2020-12-03

## 2020-12-03 PROBLEM — Z67.91 RH NEGATIVE STATE IN ANTEPARTUM PERIOD: Status: RESOLVED | Noted: 2020-04-27 | Resolved: 2020-12-03

## 2020-12-03 PROCEDURE — 99213 OFFICE O/P EST LOW 20 MIN: CPT | Performed by: PHYSICIAN ASSISTANT

## 2021-01-18 ENCOUNTER — TELEPHONE (OUTPATIENT)
Dept: OTHER | Facility: OTHER | Age: 36
End: 2021-01-18

## 2021-01-25 ENCOUNTER — OFFICE VISIT (OUTPATIENT)
Dept: DERMATOLOGY | Facility: CLINIC | Age: 36
End: 2021-01-25
Payer: COMMERCIAL

## 2021-01-25 VITALS — WEIGHT: 206.8 LBS | TEMPERATURE: 98 F | BODY MASS INDEX: 31.34 KG/M2 | HEIGHT: 68 IN

## 2021-01-25 DIAGNOSIS — L81.4 LENTIGO: Primary | ICD-10-CM

## 2021-01-25 DIAGNOSIS — D22.9 MULTIPLE BENIGN MELANOCYTIC NEVI: ICD-10-CM

## 2021-01-25 PROCEDURE — 99203 OFFICE O/P NEW LOW 30 MIN: CPT | Performed by: STUDENT IN AN ORGANIZED HEALTH CARE EDUCATION/TRAINING PROGRAM

## 2021-01-25 RX ORDER — VITAMIN B COMPLEX
2400 TABLET ORAL DAILY
COMMUNITY
End: 2021-04-07 | Stop reason: SDUPTHER

## 2021-01-25 NOTE — PROGRESS NOTES
[facility] Note     Patient Name: Ariel Colin  Encounter Date: 01/25/2021     Have you been cared for by a [facility] Dermatologist in the last 3 years and, if so, which one? NO    · Have you traveled outside of the [facility] in the past 3 months or outside of the [facility] area in the last 2 weeks? No     May we call your Preferred Phone number to discuss your specific medical information? Yes     May we leave a detailed message that includes your specific medical information? Yes      Today's Chief Concerns:   Concern #1:  Skin Concern "Face"    Past Medical History:  Have you personally ever had or currently have any of the following? · Skin cancer (such as Melanoma, Basal Cell Carcinoma, Squamous Cell Carcinoma? (If Yes, please provide more detail)- No  · Eczema: No  · Psoriasis: No  · HIV/AIDS: No  · Hepatitis B or C: No  · Tuberculosis: No  · Systemic Immunosuppression such as Diabetes, Biologic or Immunotherapy, Chemotherapy, Organ Transplantation, Bone Marrow Transplantation (If YES, please provide more detail): No  · Radiation Treatment (If YES, please provide more detail): No  · Any other major medical conditions/concerns? 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 Cooking    Family History:  Have any of your "first degree relatives" (parent, brother, sister, or child) had any of the following       · Skin cancer such as Melanoma or Merkel Cell Carcinoma or Pancreatic Cancer? No  · Eczema, Asthma, Hay Fever or Seasonal Allergies: YES, Children  · Psoriasis or Psoriatic Arthritis: No  · Do any other medical conditions seem to run in your family? If Yes, what condition and which relatives?   No    Current Medications:     Current Outpatient Medications:     cholecalciferol (VITAMIN D3) 25 mcg (1,000 units) tablet, Take 2,400 Units by mouth daily, Disp: , Rfl:     Synthroid 137 MCG tablet, Take one tab Mon-Sat and 2 tabs on Sunday Brand necessary, Disp: 102 tablet, Rfl: 0      Review of Systems:  Have you recently had or currently have any of the following? If YES, what are you doing for the problem? · Fever, chills or unintended weight loss: No  · Sudden loss or change in your vision: No  · Nausea, vomiting or blood in your stool: No  · Painful or swollen joints: No  · Wheezing or cough: No  · Changing mole or non-healing wound: No  · Nosebleeds: No  · Excessive sweating: No  · Easy or prolonged bleeding? No  · Over the last 2 weeks, how often have you been bothered by the following problems? · Taking little interest or pleasure in doing things: 1 - Not at All  · Feeling down, depressed, or hopeless: 1 - Not at All  · Rapid heartbeat with epinephrine:  No    · FEMALES ONLY:    · Are you pregnant or planning to become pregnant? No  · Are you currently or planning to be nursing or breast feeding? No    · Any known allergies? · No Known Allergies      Physical Exam:     Was a chaperone (Derm Clinical Assistant) present throughout the entire Physical Exam? Yes     Did the Dermatology Team specifically  the patient on the importance of a Full Skin Exam to be sure that nothing is missed clinically?  Yes}  o Did the patient ultimately request or accept a Full Skin Exam?  NO  o Did the patient specifically refuse to have the areas "under-the-bra" examined by the Dermatologist? No  o Did the patient specifically refuse to have the areas "under-the-underwear" examined by the Dermatologist? No    CONSTITUTIONAL:   Vitals:    01/25/21 1255   Temp: 98 °F (36 7 °C)   TempSrc: Temporal   Weight: 93 8 kg (206 lb 12 8 oz)   Height: 5' 8" (1 727 m)         PSYCH: Normal mood and affect  EYES: Normal conjunctiva  ENT: Normal lips and oral mucosa  CARDIOVASCULAR: No edema  RESPIRATORY: Normal respirations  HEME/LYMPH/IMMUNO:  No regional lymphadenopathy except as noted below in "ASSESSMENT AND PLAN BY DIAGNOSIS"    SKIN:  FOCUSED ORGAN SYSTEM EXAM  Hair, Face Normal except as noted below in Assessment   Neck Normal except as noted below in Assessment        Assessment and Plan by Diagnosis:    History of Present Condition:     Duration:  How long has this been an issue for you?    o  1 year   Location Affected:  Where on the body is this affecting you?    o  Face   Quality:  Is there any bleeding, pain, itch, burning/irritation, or redness associated with the skin lesion? o  Redness   Severity:  Describe any bleeding, pain, itch, burning/irritation, or redness on a scale of 1 to 10 (with 10 being the worst)  o  1   Timing:  Does this condition seem to be there pretty constantly or do you notice it more at specific times throughout the day?    o  Constant   Context:  Have you ever noticed that this condition seems to be associated with specific activities you do?    o  Denies   Modifying Factors:    o Anything that seems to make the condition worse?    -  Denies  o What have you tried to do to make the condition better? -  Vitamin C serums   Associated Signs and Symptoms:  Does this skin lesion seem to be associated with any of the following:  o  SL AMB DERM SIGNS AND SYMPTOMS: Redness       1  LENTIGO    Physical Exam:   Anatomic Location Affected: Right nasal bridge   Morphological Description:  6 mm tan patch   Pertinent Positives: N/A   Pertinent Negatives: N/A    Additional History of Present Condition:  Located on bridge of nose and presents for a year  Patient has tried vitamin C serums on it       Assessment and Plan:  Based on a thorough discussion of this condition and the management approach to it (including a comprehensive discussion of the known risks, side effects and potential benefits of treatment), the patient (family) agrees to implement the following specific plan:   - Discussed benign etiology and treatment with lightening creams, pt agreeable   - Rx sent for tretinoin 0 1%/fluocinolone 0 025%/kojic acid 1%/hydroquinone 8%/niacinamide 4% compounded cream via [facility]- to use 3 months on, 3 months off  Discussed need to take 3 month break given risk of paradoxical hyperpigmentation   - Possible treatment with IPL discussed as well after trailing    - Advised daily SPF 30 daily     [facility]     We use this service to prescribe medications that are often not covered by insurance  Your physician will send your prescription to the pharmacy  You will receive an email from [email or web address] where you will follow the instructions within the email to provide your billing and shipping information  Your medicine will be shipped directly to you  If you have any questions, you can call [phone] or email [email or web address]      What is a lentigo? A lentigo is a pigmented flat or slightly raised lesion with a clearly defined edge  Unlike an ephelis (freckle), it does not fade in the winter months  There are several kinds of lentigo  The name lentigo originally referred to its appearance resembling a small lentil  The plural of lentigo is lentigines, although lentigos is also in common use  Who gets lentigines? Lentigines can affect males and females of all ages and races  Solar lentigines are especially prevalent in fair skinned adults  Lentigines associated with syndromes are present at birth or arise during childhood  What causes lentigines? Common forms of lentigo are due to exposure to ultraviolet radiation:   Sun damage including sunburn    Indoor tanning    Phototherapy, especially photochemotherapy (PUVA)    Ionizing radiation, eg radiation therapy, can also cause lentigines  Several familial syndromes associated with widespread lentigines originate from mutations in Lamin-MAP kinase, mTOR signaling and PTEN pathways  What are the clinical features of lentigines?   Lentigines have been classified into several different types depending on what they look like, where they appear on the body, causative factors, and whether they are associated to other diseases or conditions  Lentigines may be solitary or more often, multiple  Most lentigines are smaller than 5 mm in diameter      Lentigo simplex   A precursor to junctional naevus    Arises during childhood and early adult life    Found on trunk and limbs    Small brown round or oval macule or thin plaque    Jagged or smooth edge    May have a dry surface    May disappear in time  Solar lentigo   A precursor to seborrhoeic keratosis    Found on chronically sun exposed sites such as hands, face, lower legs    May also follow sunburn to shoulders    Yellow, light or dark brown regular or irregular macule or thin plaque    May have a dry surface    Often has moth-eaten outline    Can slowly enlarge to several centimeters in diameter    May disappear, often through the process known as lichenoid keratosis    When atypical in appearance, may be difficult to distinguish from melanoma in situ  Ink spot lentigo   Also known as reticulated lentigo    Few in number compared to solar lentigines    Follows sunburn in very fair skinned individuals    Dark brown to black irregular ink spot-like macule  PUVA lentigo   Similar to ink spot lentigo but follows photochemotherapy (PUVA)    Location anywhere exposed to PUVA  Tanning bed lentigo   Similar to ink spot lentigo but follows indoor tanning    Location anywhere exposed to tanning bed  Radiation lentigo   Occurs in site of irradiation (accidental or therapeutic)    Associated with late-stage radiation dermatitis: epidermal atrophy, subcutaneous fibrosis, keratosis, telangiectasias  Melanotic macule   Mucosal surfaces or adjacent glabrous skin eg lip, vulva, penis, anus    Light to dark brown    Also called mucosal melanosis  Generalised lentigines   Found on any exposed or covered site from early childhood    Small macules may merge to form larger patches    Not associated with a syndrome    Also called lentigines profusa, multiple lentigines  Agminated lentigines   Naevoid eruption of lentigos confined to a single segmental area    Sharp demarcation in midline    May have associated neurological and developmental abnormalities  Patterned lentigines   Inherited tendency to lentigines on face, lips, buttocks, palms, soles    Recognised mainly in people of  ethnicity  Centrofacial neurodysraphic lentiginosis  Associated with mental retardation  Lentiginosis syndromes   Syndromes include LEOPARD/[place], Peutz-Jeghers, Laugier-Hunziker, Moynahan, Xeroderma pigmentosum, myxoma syndromes (HART, NAME, Jones), Ruvalcaba-Myhre-David, Bannayan-Zonnana syndrome, Cowden disease (multiple hamartoma syndrome )    Inheritance is autosomal dominant; sporadic cases common    Widespread lentigines present at birth or arise in early childhood    Associated with neural, endocrine, and mesenchymal tumors    How is the diagnosis made? Lentigines are usually diagnosed clinically by their typical appearance  Concern regarding possibility of melanoma may lead to:   Dermatoscopy    Diagnostic excision biopsy    Histopathology of a lentigo shows:   Thickened epidermis    An increased number of melanocytes along the basal layer of epidermis    Unlike junctional melanocytic naevus, there are no nests of melanocytes    Increased melanin pigment within the keratinocytes    Additional features depending on type of lentigo    In contrast, an ephelis (freckle) shows sun-induced increased melanin within the keratinocytes, without an increase in number of cells  What is the treatment for lentigines? Most lentigines are left alone  Attempts to lighten them may not be successful   The following approaches are used:   SPF 50+ broad-spectrum sunscreen    Hydroquinone bleaching cream    Alpha hydroxy acids    Vitamin C    Retinoids    Azelaic acid    Chemical peels  Individual lesions can be permanently removed using:   Cryotherapy    Intense pulsed light    Pigment lasers    How can lentigines be prevented? Lentigines associated with exposure ultraviolet radiation can be prevented by very careful sun protection  Clothing is more successful at preventing new lentigines than are sunscreens  What is the outlook for lentigines? Lentigines usually persist  They may increase in number with age and sun exposure  Some in sun-protected sites may fade and disappear  2  MELANOCYTIC NEVI ("Moles")    Physical Exam:   Anatomic Location Affected:   Mostly on sun-exposed areas of the Left Cheek   Morphological Description:  ~4mm skin colored papule with light tan pigment network on dermoscopy and surrounding telangiectasias (similar erythema on both cheeks)   Pertinent Positives: N/A   Pertinent Negatives: N/A    Additional History of Present Condition:  Pt notes this spot is now more apparent after pregnancy  Patient has tried vitamin C serum on the spot  Not tender, never bled  No other treatments tried    Assessment and Plan:  Based on a thorough discussion of this condition and the management approach to it (including a comprehensive discussion of the known risks, side effects and potential benefits of treatment), the patient (family) agrees to implement the following specific plan:   Discussed lesion being more apparent 2/2 surrounding erythema  Discussed possible laser treatment to help diminish erythema  Suggest IPL if decides to use IPL for lentigines or can use PDL  Patient is aware that this could be a cosmetic visit and out of pocket expense  Melanocytic Nevi  Melanocytic nevi ("moles") are caused by collections of the color producing skin cells, or melanocytes, in 1 area in the skin  They can range in color from pink to dark brown and be either raised or flat        Some moles are present at birth (I e , "congenital nevi"), while others come up later in life (i e , "acquired nevi")  [place] exposure also stimulates the body to make more moles, ie the more sun you get the more moles you'll grow  Clinically distinguishing a healthy mole from melanoma may be difficult  The "ABCDE's" of moles have been suggested as a means of helping to alert a person to a suspicious mole and the possible increased risk of melanoma  Asymmetry: Healthy moles tend to be symmetric, while melanomas are often asymmetric  Asymmetry means if you draw a line through the mole, the two halves do not match in color, size, shape, or surface texture Any mole that starts to demonstrate "asymmetry" should be examined promptly by a board certified dermatologist      Border: Healthy moles tend to have discrete, even borders  The border of a melanoma often blends into the normal skin and does not sharply delineate the mole from normal skin  Any mole that starts to demonstrate "uneven borders" should be examined promptly     Color: Healthy moles tend to be one color throughout  Melanomas tend to be made up of different colors ranging from dark black, blue, white, or red  Any mole that demonstrates a color change should be examined promptly    Diameter: Healthy moles tend to be smaller than 0 6 cm in size; an exception are "congenital nevi" that can be larger  Melanomas tend to grow and can often be greater than 0 6 cm (1/4 of an inch, or the size of a pencil eraser)  This is only a guideline, and many normal moles may be larger than 0 6 cm without being unhealthy  Any mole that starts to change in size (small to bigger or bigger to smaller) should be examined promptly    Evolving: Healthy moles tend to "stay the same "  Melanomas may often show signs of change or evolution such as a change in size, shape, color, or elevation    Any mole that starts to itch, bleed, crust, burn, hurt, or ulcerate or demonstrate a change or evolution should be examined promptly by a board certified dermatologist       What are atypical moles or dysplastic nevi? Dysplastic moles are moles that have some of the ABCDE  changes listed above but  are not cancerous  Sometimes a biopsy and microscopic examination are needed to determine the difference  They may indicate an increased risk of melanoma in that person, especially if there is a family history of melanoma  What is a Melanoma? The main concern when looking at a new or changing mole it to evaluate whether it may be a melanoma  The appearance of a "new mole" remains one of the most reliable methods for identifying a malignant melanoma  A melanoma is a type of skin cancer that can be deadly if it spreads throughout the body  The prognosis of a Melanoma depends on how deep it has penetrated in the skin  If caught early, they generally will not have had time to grow into the deeper layers of the skin and they cure rate is then very high  Once the melanoma grows deeper into the skin, the cure rate drops dramatically  Therefore, early detection and removal of a malignant melanoma results in a much better chance of complete cure                   Scribe Attestation    I,:  Michael Thompson am acting as a scribe while in the presence of the attending physician :       I,:  Valerie Arnold MD personally performed the services described in this documentation    as scribed in my presence :

## 2021-01-25 NOTE — PATIENT INSTRUCTIONS
1  LENTIGO    Assessment and Plan:  Based on a thorough discussion of this condition and the management approach to it (including a comprehensive discussion of the known risks, side effects and potential benefits of treatment), the patient (family) agrees to implement the following specific plan:  [facility]     We use this service to prescribe medications that are often not covered by insurance  Your physician will send your prescription to the pharmacy  You will receive an email from [email or web address] where you will follow the instructions within the email to provide your billing and shipping information  Your medicine will be shipped directly to you  If you have any questions, you can call [phone] or email [email or web address]       What is a lentigo? A lentigo is a pigmented flat or slightly raised lesion with a clearly defined edge  Unlike an ephelis (freckle), it does not fade in the winter months  There are several kinds of lentigo  The name lentigo originally referred to its appearance resembling a small lentil  The plural of lentigo is lentigines, although lentigos is also in common use  Who gets lentigines? Lentigines can affect males and females of all ages and races  Solar lentigines are especially prevalent in fair skinned adults  Lentigines associated with syndromes are present at birth or arise during childhood  What causes lentigines? Common forms of lentigo are due to exposure to ultraviolet radiation:   Sun damage including sunburn    Indoor tanning    Phototherapy, especially photochemotherapy (PUVA)    Ionizing radiation, eg radiation therapy, can also cause lentigines  Several familial syndromes associated with widespread lentigines originate from mutations in Lamin-MAP kinase, mTOR signaling and PTEN pathways  What are the clinical features of lentigines?   Lentigines have been classified into several different types depending on what they look like, where they appear on the body, causative factors, and whether they are associated to other diseases or conditions  Lentigines may be solitary or more often, multiple  Most lentigines are smaller than 5 mm in diameter      Lentigo simplex   A precursor to junctional naevus    Arises during childhood and early adult life    Found on trunk and limbs    Small brown round or oval macule or thin plaque    Jagged or smooth edge    May have a dry surface    May disappear in time  Solar lentigo   A precursor to seborrhoeic keratosis    Found on chronically sun exposed sites such as hands, face, lower legs    May also follow sunburn to shoulders    Yellow, light or dark brown regular or irregular macule or thin plaque    May have a dry surface    Often has moth-eaten outline    Can slowly enlarge to several centimeters in diameter    May disappear, often through the process known as lichenoid keratosis    When atypical in appearance, may be difficult to distinguish from melanoma in situ  Ink spot lentigo   Also known as reticulated lentigo    Few in number compared to solar lentigines    Follows sunburn in very fair skinned individuals    Dark brown to black irregular ink spot-like macule  PUVA lentigo   Similar to ink spot lentigo but follows photochemotherapy (PUVA)    Location anywhere exposed to PUVA  Tanning bed lentigo   Similar to ink spot lentigo but follows indoor tanning    Location anywhere exposed to tanning bed  Radiation lentigo   Occurs in site of irradiation (accidental or therapeutic)    Associated with late-stage radiation dermatitis: epidermal atrophy, subcutaneous fibrosis, keratosis, telangiectasias  Melanotic macule   Mucosal surfaces or adjacent glabrous skin eg lip, vulva, penis, anus    Light to dark brown    Also called mucosal melanosis  Generalised lentigines   Found on any exposed or covered site from early childhood    Small macules may merge to form larger patches    Not associated with a syndrome    Also called lentigines profusa, multiple lentigines  Agminated lentigines   Naevoid eruption of lentigos confined to a single segmental area    Sharp demarcation in midline    May have associated neurological and developmental abnormalities  Patterned lentigines   Inherited tendency to lentigines on face, lips, buttocks, palms, soles    Recognised mainly in people of  ethnicity  Centrofacial neurodysraphic lentiginosis  Associated with mental retardation  Lentiginosis syndromes   Syndromes include LEOPARD/Yamile, Peutz-Jeghers, Laugier-Hunziker, Moynahan, Xeroderma pigmentosum, myxoma syndromes (HART, NAME, Jones), Ruvalcaba-Myhre-David, Bannayan-Zonnana syndrome, Cowden disease (multiple hamartoma syndrome )    Inheritance is autosomal dominant; sporadic cases common    Widespread lentigines present at birth or arise in early childhood    Associated with neural, endocrine, and mesenchymal tumors    How is the diagnosis made? Lentigines are usually diagnosed clinically by their typical appearance  Concern regarding possibility of melanoma may lead to:   Dermatoscopy    Diagnostic excision biopsy    Histopathology of a lentigo shows:   Thickened epidermis    An increased number of melanocytes along the basal layer of epidermis    Unlike junctional melanocytic naevus, there are no nests of melanocytes    Increased melanin pigment within the keratinocytes    Additional features depending on type of lentigo    In contrast, an ephelis (freckle) shows sun-induced increased melanin within the keratinocytes, without an increase in number of cells  What is the treatment for lentigines? Most lentigines are left alone  Attempts to lighten them may not be successful   The following approaches are used:   SPF 50+ broad-spectrum sunscreen    Hydroquinone bleaching cream    Alpha hydroxy acids    Vitamin C    Retinoids    Azelaic acid  Chemical peels  Individual lesions can be permanently removed using:   Cryotherapy    Intense pulsed light    Pigment lasers    How can lentigines be prevented? Lentigines associated with exposure ultraviolet radiation can be prevented by very careful sun protection  Clothing is more successful at preventing new lentigines than are sunscreens  What is the outlook for lentigines? Lentigines usually persist  They may increase in number with age and sun exposure  Some in sun-protected sites may fade and disappear  2  MELANOCYTIC NEVI ("Moles")      Assessment and Plan:  Based on a thorough discussion of this condition and the management approach to it (including a comprehensive discussion of the known risks, side effects and potential benefits of treatment), the patient (family) agrees to implement the following specific plan:   May consider laser treatment  Patient is aware that this could be a cosmetic visit and out of pocket expense  Melanocytic Nevi  Melanocytic nevi ("moles") are caused by collections of the color producing skin cells, or melanocytes, in 1 area in the skin  They can range in color from pink to dark brown and be either raised or flat  Some moles are present at birth (I e , "congenital nevi"), while others come up later in life (i e , "acquired nevi")  Selinda Holbrook exposure also stimulates the body to make more moles, ie the more sun you get the more moles you'll grow  Clinically distinguishing a healthy mole from melanoma may be difficult  The "ABCDE's" of moles have been suggested as a means of helping to alert a person to a suspicious mole and the possible increased risk of melanoma  Asymmetry: Healthy moles tend to be symmetric, while melanomas are often asymmetric    Asymmetry means if you draw a line through the mole, the two halves do not match in color, size, shape, or surface texture Any mole that starts to demonstrate "asymmetry" should be examined promptly by a board certified dermatologist      Claire Vernon: Healthy moles tend to have discrete, even borders  The border of a melanoma often blends into the normal skin and does not sharply delineate the mole from normal skin  Any mole that starts to demonstrate "uneven borders" should be examined promptly     Color: Healthy moles tend to be one color throughout  Melanomas tend to be made up of different colors ranging from dark black, blue, white, or red  Any mole that demonstrates a color change should be examined promptly    Diameter: Healthy moles tend to be smaller than 0 6 cm in size; an exception are "congenital nevi" that can be larger  Melanomas tend to grow and can often be greater than 0 6 cm (1/4 of an inch, or the size of a pencil eraser)  This is only a guideline, and many normal moles may be larger than 0 6 cm without being unhealthy  Any mole that starts to change in size (small to bigger or bigger to smaller) should be examined promptly    Evolving: Healthy moles tend to "stay the same "  Melanomas may often show signs of change or evolution such as a change in size, shape, color, or elevation  Any mole that starts to itch, bleed, crust, burn, hurt, or ulcerate or demonstrate a change or evolution should be examined promptly by a board certified dermatologist       What are atypical moles or dysplastic nevi? Dysplastic moles are moles that have some of the ABCDE  changes listed above but  are not cancerous  Sometimes a biopsy and microscopic examination are needed to determine the difference  They may indicate an increased risk of melanoma in that person, especially if there is a family history of melanoma  What is a Melanoma? The main concern when looking at a new or changing mole it to evaluate whether it may be a melanoma  The appearance of a "new mole" remains one of the most reliable methods for identifying a malignant melanoma     A melanoma is a type of skin cancer that can be deadly if it spreads throughout the body  The prognosis of a Melanoma depends on how deep it has penetrated in the skin  If caught early, they generally will not have had time to grow into the deeper layers of the skin and they cure rate is then very high  Once the melanoma grows deeper into the skin, the cure rate drops dramatically  Therefore, early detection and removal of a malignant melanoma results in a much better chance of complete cure

## 2021-02-17 DIAGNOSIS — E03.8 HYPOTHYROIDISM DUE TO HASHIMOTO'S THYROIDITIS: ICD-10-CM

## 2021-02-17 DIAGNOSIS — E06.3 HYPOTHYROIDISM DUE TO HASHIMOTO'S THYROIDITIS: ICD-10-CM

## 2021-02-17 DIAGNOSIS — E03.8 HYPOTHYROIDISM DUE TO HASHIMOTO'S THYROIDITIS: Primary | ICD-10-CM

## 2021-02-17 DIAGNOSIS — E06.3 HYPOTHYROIDISM DUE TO HASHIMOTO'S THYROIDITIS: Primary | ICD-10-CM

## 2021-02-17 RX ORDER — LEVOTHYROXINE SODIUM 137 MCG
TABLET ORAL
Qty: 30 TABLET | Refills: 0 | Status: SHIPPED | OUTPATIENT
Start: 2021-02-17 | End: 2021-04-07

## 2021-02-17 NOTE — TELEPHONE ENCOUNTER
Pt requested a refill of Levothyroxine  Last appt 2/2020   No upcoming appt  Changed to 30 days until patient is seen

## 2021-03-10 DIAGNOSIS — Z23 ENCOUNTER FOR IMMUNIZATION: ICD-10-CM

## 2021-03-19 ENCOUNTER — IMMUNIZATIONS (OUTPATIENT)
Dept: FAMILY MEDICINE CLINIC | Facility: HOSPITAL | Age: 36
End: 2021-03-19

## 2021-03-19 DIAGNOSIS — Z23 ENCOUNTER FOR IMMUNIZATION: Primary | ICD-10-CM

## 2021-03-19 PROCEDURE — 91300 SARS-COV-2 / COVID-19 MRNA VACCINE (PFIZER-BIONTECH) 30 MCG: CPT

## 2021-03-19 PROCEDURE — 0001A SARS-COV-2 / COVID-19 MRNA VACCINE (PFIZER-BIONTECH) 30 MCG: CPT

## 2021-04-07 ENCOUNTER — OFFICE VISIT (OUTPATIENT)
Dept: ENDOCRINOLOGY | Facility: CLINIC | Age: 36
End: 2021-04-07
Payer: COMMERCIAL

## 2021-04-07 ENCOUNTER — APPOINTMENT (OUTPATIENT)
Dept: LAB | Facility: CLINIC | Age: 36
End: 2021-04-07
Payer: COMMERCIAL

## 2021-04-07 VITALS
SYSTOLIC BLOOD PRESSURE: 100 MMHG | DIASTOLIC BLOOD PRESSURE: 70 MMHG | HEART RATE: 76 BPM | WEIGHT: 206 LBS | BODY MASS INDEX: 31.22 KG/M2 | HEIGHT: 68 IN

## 2021-04-07 DIAGNOSIS — E04.2 MULTIPLE THYROID NODULES: ICD-10-CM

## 2021-04-07 DIAGNOSIS — E55.9 VITAMIN D DEFICIENCY: ICD-10-CM

## 2021-04-07 DIAGNOSIS — E03.9 HYPOTHYROIDISM, UNSPECIFIED TYPE: Primary | ICD-10-CM

## 2021-04-07 DIAGNOSIS — E03.9 HYPOTHYROIDISM, UNSPECIFIED TYPE: ICD-10-CM

## 2021-04-07 LAB
25(OH)D3 SERPL-MCNC: 19.4 NG/ML (ref 30–100)
T4 FREE SERPL-MCNC: 1.15 NG/DL (ref 0.76–1.46)
TSH SERPL DL<=0.05 MIU/L-ACNC: 4.4 UIU/ML (ref 0.36–3.74)

## 2021-04-07 PROCEDURE — 82306 VITAMIN D 25 HYDROXY: CPT

## 2021-04-07 PROCEDURE — 99214 OFFICE O/P EST MOD 30 MIN: CPT | Performed by: PHYSICIAN ASSISTANT

## 2021-04-07 PROCEDURE — 36415 COLL VENOUS BLD VENIPUNCTURE: CPT

## 2021-04-07 PROCEDURE — 84443 ASSAY THYROID STIM HORMONE: CPT

## 2021-04-07 PROCEDURE — 84439 ASSAY OF FREE THYROXINE: CPT

## 2021-04-07 RX ORDER — LEVOTHYROXINE SODIUM 150 UG/1
150 TABLET ORAL DAILY
Qty: 90 TABLET | Refills: 3 | Status: SHIPPED | OUTPATIENT
Start: 2021-04-07 | End: 2021-04-09 | Stop reason: ALTCHOICE

## 2021-04-07 RX ORDER — VITAMIN B COMPLEX
2000 TABLET ORAL DAILY
Start: 2021-04-07 | End: 2021-04-07 | Stop reason: SDUPTHER

## 2021-04-07 RX ORDER — VITAMIN B COMPLEX
TABLET ORAL
Start: 2021-04-07

## 2021-04-07 NOTE — ASSESSMENT & PLAN NOTE
Continue Synthroid 137mcg daily  Check TSH/Free T4 today  After  Results reviewed she will need 90 day refill to mail order

## 2021-04-07 NOTE — PROGRESS NOTES
Established Patient Progress Note       Chief Complaint   Patient presents with    Hypothyroidism        History of Present Illness:     Kayla Eddy is a 38 y o  female with a history of hypothyroidism due to hashimotos thyroiditis, Thyroid Nodule, and Vitamin D Deficiency  For Hypothyroidism, she is taking Synthroid 137mcg daily  Did not do well on generic levothyroxine  Since last visit, she delivered a daughter 5 months ago  She is feeling well in general   She is back on pre-pregnancy dose of Synthroid but has not yet gotten a chance to do lab testing  Will do labs today  She had salpingectomy and no plans for future pregnancy  She is not breastfeeding and she is getting periods on a regular basis     She does have history of thyroid nodule which required FNA in  and abnormal/unusual FNA result lead to lymphoma workup which was negative  Most recent ultrasound showed no nodules in 2018       For Vitamin D Deficiency she is taking supplements (2,000 units daily)          Needs 90 day supply for Synthroid Mail order after labs         Patient Active Problem List   Diagnosis    Multiple thyroid nodules    Vitamin D deficiency    Hypothyroidism    Cystic fibrosis carrier    GERD (gastroesophageal reflux disease)    S/P  section    Status post bilateral salpingectomy      Past Medical History:   Diagnosis Date    Abnormal Pap smear of cervix     AMA (advanced maternal age) multigravida 33+     Cystic fibrosis carrier     Disease of thyroid gland     hashimotos thyroiditis, nodule, hypo    Female infertility     3 years of infertilty treatments; this is a spontaneous pregnancy    HPV (human papilloma virus) infection     Migraine     PVC (premature ventricular contraction)     daily had them intermittently    Varicella     childhood    Vitiligo       Past Surgical History:   Procedure Laterality Date     SECTION       SECTION  2019    COLPOSCOPY  DILATION AND CURETTAGE OF UTERUS      age 19 with TOP, and SAB in 2017    OPERATIVE HYSTEROSCOPY      removal of septum    HI  DELIVERY ONLY N/A 2019    Procedure:  SECTION () REPEAT;  Surgeon: Blaine Escalona MD;  [facility]: [facility];  Service: Obstetrics    HI  DELIVERY ONLY N/A 10/27/2020    Procedure:  SECTION () REPEAT;  Surgeon: Kristal Green MD;  [facility];  Service: Obstetrics    HI LIGATION,FALLOPIAN TUBE W/ Bilateral 10/27/2020    Procedure: LIGATION/COAGULATION TUBAL;  Surgeon: Kristal Green MD;  [facility];  Service: Obstetrics    REDUCTION MAMMAPLASTY        Family History   Problem Relation Age of Onset    Hypertension Mother     Multiple sclerosis Mother     Hashimoto's thyroiditis Mother     Factor V Leiden deficiency Mother     Other Mother         ms    Thyroid disease Sister     Factor V Leiden deficiency Sister     Hashimoto's thyroiditis Brother     Lupus Brother     No Known Problems Father     No Known Problems Daughter     COPD Maternal Grandmother     Deep vein thrombosis Maternal Grandfather     Diabetes Paternal Grandmother         TYPE 2    Cancer Paternal Grandfather         [email or web address]    No Known Problems Son     Factor V Leiden deficiency Sister     Celiac disease Sister     Cystic fibrosis Maternal Aunt      Social History     Tobacco Use    Smoking status: Never Smoker    Smokeless tobacco: Never Used   Substance Use Topics    Alcohol use: Yes     Alcohol/week: 2 0 standard drinks     Types: 2 Cans of beer per week     Frequency: 2-3 times a week     No Known Allergies    Current Outpatient Medications:     cholecalciferol (VITAMIN D3) 25 mcg (1,000 units) tablet, Take 2 tablets (2,000 Units total) by mouth daily, Disp: , Rfl:     Synthroid 137 MCG tablet, 1 tab daily  Brand medically necessary  , Disp: 30 tablet, Rfl: 0    Review of Systems   Constitutional: Negative for activity change, appetite change, chills, diaphoresis, fatigue, fever and unexpected weight change  HENT: Negative for trouble swallowing and voice change  Eyes: Negative for visual disturbance  Respiratory: Negative for shortness of breath  Cardiovascular: Negative for chest pain and palpitations  Gastrointestinal: Negative for abdominal pain, constipation and diarrhea  Endocrine: Negative for cold intolerance, heat intolerance, polydipsia, polyphagia and polyuria  Genitourinary: Negative for frequency and menstrual problem  Musculoskeletal: Negative for arthralgias and myalgias  Skin: Negative for rash  Allergic/Immunologic: Negative for food allergies  Neurological: Negative for dizziness and tremors  Hematological: Negative for adenopathy  Psychiatric/Behavioral: Negative for sleep disturbance  All other systems reviewed and are negative  Physical Exam:  Body mass index is 31 32 kg/m²  /70 (BP Location: Left arm, Patient Position: Sitting, Cuff Size: Standard)   Pulse 76   Ht 5' 8" (1 727 m)   Wt 93 4 kg (206 lb)   BMI 31 32 kg/m²    Wt Readings from Last 3 Encounters:   04/07/21 93 4 kg (206 lb)   01/25/21 93 8 kg (206 lb 12 8 oz)   12/03/20 94 3 kg (207 lb 12 8 oz)       Physical Exam  Vitals signs reviewed  Constitutional:       General: She is not in acute distress  Appearance: She is well-developed  HENT:      Head: Normocephalic and atraumatic  Eyes:      Conjunctiva/sclera: Conjunctivae normal       Pupils: Pupils are equal, round, and reactive to light  Neck:      Musculoskeletal: Normal range of motion and neck supple  Thyroid: No thyromegaly  Cardiovascular:      Rate and Rhythm: Normal rate and regular rhythm  Heart sounds: Normal heart sounds  Pulmonary:      Effort: Pulmonary effort is normal  No respiratory distress  Breath sounds: Normal breath sounds  No wheezing or rales     Abdominal:      General: Bowel sounds are normal  There is no distension  Palpations: Abdomen is soft  Tenderness: There is no abdominal tenderness  Musculoskeletal: Normal range of motion  Skin:     General: Skin is warm and dry  Neurological:      Mental Status: She is alert and oriented to person, place, and time  Labs:       No results found for: CREATININE, BUN, NA, K, CL, CO2  No results found for: EGFR    Lab Results   Component Value Date    HDL 55 09/12/2018    TRIG 176 (H) 09/12/2018       No results found for: ALT, AST, GGT, ALKPHOS, BILITOT    Lab Results   Component Value Date    FREET4 0 89 08/28/2020         Impression & Plan:    Problem List Items Addressed This Visit        Endocrine    Multiple thyroid nodules     Ultrasound in 2018 showed no nodules  Hypothyroidism - Primary     Continue Synthroid 137mcg daily  Check TSH/Free T4 today  After  Results reviewed she will need 90 day refill to mail order  Relevant Medications    cholecalciferol (VITAMIN D3) 25 mcg (1,000 units) tablet    Other Relevant Orders    TSH, 3rd generation    T4, free    TSH, 3rd generation    T4, free       Other    Vitamin D deficiency     Continue Supplements  Check Vitamin D level  Relevant Orders    Vitamin D 25 hydroxy          Orders Placed This Encounter   Procedures    TSH, 3rd generation     This is a patient instruction: This test is non-fasting  Please drink two glasses of water morning of bloodwork  Standing Status:   Future     Standing Expiration Date:   10/7/2021    T4, free     Standing Status:   Future     Standing Expiration Date:   10/7/2021    Vitamin D 25 hydroxy     Standing Status:   Future     Standing Expiration Date:   10/7/2021    TSH, 3rd generation     This is a patient instruction: This test is non-fasting  Please drink two glasses of water morning of bloodwork          Standing Status:   Future     Standing Expiration Date:   10/7/2022    T4, free     Standing Status: Future     Standing Expiration Date:   10/7/2022       There are no Patient Instructions on file for this visit  Discussed with the patient and all questioned fully answered  She will call me if any problems arise  Follow-up appointment in 12 months       Counseled patient on diagnostic results, prognosis, risk and benefit of treatment options, instruction for management, importance of treatment compliance, Risk  factor reduction and impressions      Sam Almanzar PA-C

## 2021-04-09 DIAGNOSIS — E03.9 HYPOTHYROIDISM, UNSPECIFIED TYPE: Primary | ICD-10-CM

## 2021-04-09 RX ORDER — LEVOTHYROXINE SODIUM 137 MCG
137 TABLET ORAL DAILY
Qty: 90 TABLET | Refills: 3 | Status: SHIPPED | OUTPATIENT
Start: 2021-04-09 | End: 2021-09-27 | Stop reason: SDUPTHER

## 2021-04-10 ENCOUNTER — IMMUNIZATIONS (OUTPATIENT)
Dept: FAMILY MEDICINE CLINIC | Facility: HOSPITAL | Age: 36
End: 2021-04-10

## 2021-04-10 DIAGNOSIS — Z23 ENCOUNTER FOR IMMUNIZATION: Primary | ICD-10-CM

## 2021-04-10 PROCEDURE — 0002A SARS-COV-2 / COVID-19 MRNA VACCINE (PFIZER-BIONTECH) 30 MCG: CPT

## 2021-04-10 PROCEDURE — 91300 SARS-COV-2 / COVID-19 MRNA VACCINE (PFIZER-BIONTECH) 30 MCG: CPT

## 2021-04-27 ENCOUNTER — OFFICE VISIT (OUTPATIENT)
Dept: DERMATOLOGY | Facility: CLINIC | Age: 36
End: 2021-04-27
Payer: COMMERCIAL

## 2021-04-27 VITALS — BODY MASS INDEX: 31.22 KG/M2 | WEIGHT: 206 LBS | HEIGHT: 68 IN | TEMPERATURE: 99.4 F

## 2021-04-27 DIAGNOSIS — L70.0 ACNE VULGARIS: ICD-10-CM

## 2021-04-27 DIAGNOSIS — L81.4 SOLAR LENTIGO: Primary | ICD-10-CM

## 2021-04-27 PROCEDURE — 99213 OFFICE O/P EST LOW 20 MIN: CPT | Performed by: STUDENT IN AN ORGANIZED HEALTH CARE EDUCATION/TRAINING PROGRAM

## 2021-04-27 RX ORDER — SPIRONOLACTONE 100 MG/1
TABLET, FILM COATED ORAL
Qty: 60 TABLET | Refills: 2 | Status: SHIPPED | OUTPATIENT
Start: 2021-04-27 | End: 2021-08-27 | Stop reason: SDUPTHER

## 2021-04-27 NOTE — PATIENT INSTRUCTIONS
1  SOLAR LENTIGO      Assessment and Plan:  Based on a thorough discussion of this condition and the management approach to it (including a comprehensive discussion of the known risks, side effects and potential benefits of treatment), the patient (family) agrees to implement the following specific plan:   Continue using compound cream as directed  Take 3 months break and then start again in the  season  2  ACNE VULGARIS ("COMMON ACNE")    Assessment and Plan:   We reviewed the causes of acne, the kinds of acne, and the expected clinical course   We discussed treatment options ranging from over-the-counter products, topical retinoids, antibiotics, BP, hormonal therapies (OCPs/spironolactone), and isotretinoin (Accutane)   We reviewed specific over-the-counter interventions and medications  Recommended typical hygiene measures washing regularly with mild cleanser, and refraining from picking and popping any pimples  Recommended non-comedogenic sunscreen use daily   Expectations of therapy discussed  Side effects, risks and benefits of medications discussed  A comprehensive handout with treatment plan provided  The phone number to call in case of questions or concerns (and instructions to stop medications in such a scenario) was provided   Counseled patient on treatment options  After lengthy discussion of etiology and treatment options, we decided to implement/continue the following personalized treatment plan  All adjustments from prior treatments highlighted below   Discussed fading away of post inflammatory erythema and hyperpigmentation and that good sun protection and topical tretinoin would help with this  Mornin  Wash your face with a gentle face wash - like Cetaphil wash, Cerave Hydrating , LaRoche Hydrating Cleanser   2  Follow with an oil-free sunscreen (SPF 30 or higher)  Some options include Neutrogena oil-free moisturizer with SPF     Night:    1   Wash your face with a gentle face wash - like Cetaphil wash, Cerave Hydrating , LaRoche Hydrating Cleanser   2  Pat dry your face, wait 15 mins and then apply a pea-sized amount of tretinoin 0 025% - an even thin layer on your face  Can be drying  Start by using every other night and then build it up to every night  Avoid the eye area and the curves around your nose and corners of your lips  - If too irritating, keep using spaced out to every other night or every 3rd night and slowly increase frequency of use to nightly  Can also use on back and chest if you have these areas involved with acne  - If you were prescribed a prescription strength version and this is not covered by insurance, you can get over the counter Differin Gel or LaRoche Adapalene 0 1% gel  3  Follow with an oil free moisturizer on top of this medicine to combat the dryness  Other common side effects include headaches; dizziness; discoloration of scars, gums, or teeth (often with minocycline); nail changes  Minocycline can rarely cause liver disease, joint pains, severe skin rashes, and flu-like symptoms  If you should notice yellowing of the eyes or skin, or any of the above, notify your doctor and stop using the medication immediately  Spironolactone (prescription medicine) - take 1 pill daily for 1 week then 2 pills daily  -Start spironolactone; discussed risks, benefits, side effects including breast tenderness, breast enlargement, spotting, diuresis, dizziness   -Limit bananas and avocados to one daily  Avoid coconut water while on this medicine     Make sure all products you use on face are labeled as "non-comedongenic" or "oil-free" or " does not clog pores"  Acne can be frustrating and difficult to treat  Most acne regimens take 2-3 months to see an improvement, so stick with them  Don't give up! As always, call your doctor if you have any concerns about your medications

## 2021-04-27 NOTE — PROGRESS NOTES
[facility] Follow Up Note    Patient Name: Joaquim Kirby  Encounter Date: 04/27/2021    Today's Chief Concerns:  Ignacio Puente Concern #1:  Follow up lentigo       Current Medications:    Current Outpatient Medications:     cholecalciferol (VITAMIN D3) 25 mcg (1,000 units) tablet, Take 4,000 units daily  , Disp: , Rfl:     Synthroid 137 MCG tablet, Take 1 tablet (137 mcg total) by mouth daily, Disp: 90 tablet, Rfl: 3    CONSTITUTIONAL:   Vitals:    04/27/21 1406   Temp: 99 4 °F (37 4 °C)   TempSrc: Tympanic   Weight: 93 4 kg (206 lb)   Height: 5' 8" (1 727 m)         Specific Alerts:    Have you been seen by a [facility] Dermatologist in the last 3 years? YES    Are you pregnant or planning to become pregnant? No    Are you currently or planning to be nursing or breast feeding? No    No Known Allergies    May we call your Preferred Phone number to discuss your specific medical information? YES    May we leave a detailed message that includes your specific medical information? YES    Have you traveled outside of the [facility] in the past 3 months? No    Do you currently have a pacemaker or defibrillator? No    Do you have any artificial heart valves, joints, plates, screws, rods, stents, pins, etc? No   - If Yes, were any placed within the last 2 years? Do you require any medications prior to a surgical procedure? No   - If Yes, for which procedure? - If Yes, what medications to you require? Are you taking any medications that cause you to bleed more easily ("blood thinners") No    Have you ever experienced a rapid heartbeat with epinephrine? No    Have you ever been treated with "gold" (gold sodium thiomalate) therapy? No    [facility] can help with wrinkles, "laugh lines," facial volume loss, "double chin," "love handles," age spots, and more  Are you interested in learning today about some of the skin enhancement procedures that we offer?  (If Yes, please provide more detail) No    Review of Systems:  Have you recently had or currently have any of the following? · Fever or chills: No  · Night Sweats: No  · Headaches: No  · Weight Gain: No  · Weight Loss: No  · Blurry Vision: No  · Nausea: No  · Vomiting: No  · Diarrhea: No  · Blood in Stool: No  · Abdominal Pain: No  · Itchy Skin: No  · Painful Joints: No  · Swollen Joints: No  · Muscle Pain: No  · Irregular Mole: No  · Sun Burn: No  · Dry Skin: No  · Skin Color Changes: No  · Scar or Keloid: No  · Cold Sores/Fever Blisters: No  · Bacterial Infections/MRSA: No  · Anxiety: No  · Depression: No  · Suicidal or Homicidal Thoughts: No      PSYCH: Normal mood and affect  EYES: Normal conjunctiva  ENT: Normal lips and oral mucosa  CARDIOVASCULAR: No edema  RESPIRATORY: Normal respirations  HEME/LYMPH/IMMUNO:  No regional lymphadenopathy except as noted below in ASSESSMENT AND PLAN BY DIAGNOSIS    SKIN:  FOCUSED ORGAN SYSTEM EXAM   Hair, Ears, Face Normal except as noted below in Assessment   Neck Normal except as noted below in Assessment       1  SOLAR LENTIGO    Physical Exam:   Anatomic Location Affected:  Right nasal Bridge    Morphological Description:  Tan macule   Pertinent Positives:   Pertinent Negatives: Additional History of Present Condition:  Patient is currently using a compound prescription with Tretinoin 0 1%, Fluocinolone 0 025%, Kijic acid 1%, hydroquinone 8%, niacinamide 4% cream via [facility]  Today she's here for follow up check up  Has reached 3 months of this treatment  Assessment and Plan:  Based on a thorough discussion of this condition and the management approach to it (including a comprehensive discussion of the known risks, side effects and potential benefits of treatment), the patient (family) agrees to implement the following specific plan:   Take drug holiday from skin lightening cream  Will start tretinoin alone as noted below    Take 3 months break and then start again in the fall season  2  ACNE VULGARIS ("COMMON ACNE")  Physical Exam:  Anatomic Location Affected & Morphological Description:  Face, mostly chin with scattered open/closed comedones, inflammatory papules    Additional History of Present Condition:  Present for several months   Treatments tried include: Tretinoin 0 1% compound cream Fluocinolone 0 025%, Kijic acid 1%, hydroquinone 8%, niacinamide 4% cream via [facility]  Assessment and Plan:   We reviewed the causes of acne, the kinds of acne, and the expected clinical course   We discussed treatment options ranging from over-the-counter products, topical retinoids, antibiotics, BP, hormonal therapies (OCPs/spironolactone), and isotretinoin (Accutane)   We reviewed specific over-the-counter interventions and medications  Recommended typical hygiene measures washing regularly with mild cleanser, and refraining from picking and popping any pimples  Recommended non-comedogenic sunscreen use daily   Expectations of therapy discussed  Side effects, risks and benefits of medications discussed  A comprehensive handout with treatment plan provided  The phone number to call in case of questions or concerns (and instructions to stop medications in such a scenario) was provided   Counseled patient on treatment options  After lengthy discussion of etiology and treatment options, we decided to implement/continue the following personalized treatment plan  All adjustments from prior treatments highlighted below  Mornin  Wash your face with a gentle face wash - like Cetaphil wash, Cerave Hydrating , LaRoche Hydrating Cleanser   2  Follow with an oil-free sunscreen (SPF 30 or higher)  Some options include Neutrogena oil-free moisturizer with SPF     Night:    1  Wash your face with a gentle face wash - like Cetaphil wash, Cerave Hydrating , LaRoche Hydrating Cleanser   2   Pat dry your face, wait 15 mins and then apply a pea-sized amount of tretinoin 0 025% - an even thin layer on your face  Can be drying  Start by using every other night and then build it up to every night  Avoid the eye area and the curves around your nose and corners of your lips  - If too irritating, keep using spaced out to every other night or every 3rd night and slowly increase frequency of use to nightly  Can also use on back and chest if you have these areas involved with acne  - If you were prescribed a prescription strength version and this is not covered by insurance, you can get over the counter Differin Gel or LaRoche Adapalene 0 1% gel  3  Follow with an oil free moisturizer on top of this medicine to combat the dryness  Other common side effects include headaches; dizziness; discoloration of scars, gums, or teeth (often with minocycline); nail changes  Minocycline can rarely cause liver disease, joint pains, severe skin rashes, and flu-like symptoms  If you should notice yellowing of the eyes or skin, or any of the above, notify your doctor and stop using the medication immediately  Spironolactone (prescription medicine) - take 1 pill daily for 1 week then 2 pills daily  -Start spironolactone; discussed risks, benefits, side effects including breast tenderness, breast enlargement, spotting, diuresis, dizziness   -Limit bananas and avocados to one daily  Avoid coconut water while on this medicine     Make sure all products you use on face are labeled as "non-comedongenic" or "oil-free" or " does not clog pores"  Acne can be frustrating and difficult to treat  Most acne regimens take 2-3 months to see an improvement, so stick with them  Don't give up! As always, call your doctor if you have any concerns about your medications       Scribe Attestation    I,:  Pat Higuera MA am acting as a scribe while in the presence of the attending physician :       I,:  Nate Rogers MD personally performed the services described in this documentation    as scribed in my presence :

## 2021-08-27 ENCOUNTER — OFFICE VISIT (OUTPATIENT)
Dept: FAMILY MEDICINE CLINIC | Facility: CLINIC | Age: 36
End: 2021-08-27
Payer: COMMERCIAL

## 2021-08-27 VITALS
DIASTOLIC BLOOD PRESSURE: 70 MMHG | SYSTOLIC BLOOD PRESSURE: 116 MMHG | HEART RATE: 84 BPM | WEIGHT: 201.4 LBS | HEIGHT: 69 IN | BODY MASS INDEX: 29.83 KG/M2 | RESPIRATION RATE: 16 BRPM

## 2021-08-27 DIAGNOSIS — E55.9 VITAMIN D DEFICIENCY: ICD-10-CM

## 2021-08-27 DIAGNOSIS — L70.0 ACNE VULGARIS: ICD-10-CM

## 2021-08-27 DIAGNOSIS — Z13.228 SCREENING FOR METABOLIC DISORDER: ICD-10-CM

## 2021-08-27 DIAGNOSIS — Z00.00 ANNUAL PHYSICAL EXAM: Primary | ICD-10-CM

## 2021-08-27 DIAGNOSIS — M79.89 PALPABLE MASS OF SOFT TISSUE OF SHOULDER: ICD-10-CM

## 2021-08-27 DIAGNOSIS — Z13.220 SCREENING FOR LIPID DISORDERS: ICD-10-CM

## 2021-08-27 DIAGNOSIS — F41.1 GENERALIZED ANXIETY DISORDER: ICD-10-CM

## 2021-08-27 DIAGNOSIS — Z13.1 SCREENING FOR DIABETES MELLITUS: ICD-10-CM

## 2021-08-27 DIAGNOSIS — D64.9 ANEMIA, UNSPECIFIED TYPE: ICD-10-CM

## 2021-08-27 PROBLEM — K21.9 GERD (GASTROESOPHAGEAL REFLUX DISEASE): Status: RESOLVED | Noted: 2020-04-27 | Resolved: 2021-08-27

## 2021-08-27 PROCEDURE — 99395 PREV VISIT EST AGE 18-39: CPT | Performed by: NURSE PRACTITIONER

## 2021-08-27 PROCEDURE — 99214 OFFICE O/P EST MOD 30 MIN: CPT | Performed by: NURSE PRACTITIONER

## 2021-08-27 RX ORDER — SERTRALINE HYDROCHLORIDE 25 MG/1
25 TABLET, FILM COATED ORAL DAILY
Qty: 30 TABLET | Refills: 1 | Status: SHIPPED | OUTPATIENT
Start: 2021-08-27 | End: 2021-10-28 | Stop reason: SDUPTHER

## 2021-08-27 RX ORDER — SPIRONOLACTONE 50 MG/1
TABLET, FILM COATED ORAL
Start: 2021-08-27 | End: 2022-06-13 | Stop reason: SDUPTHER

## 2021-08-27 NOTE — PROGRESS NOTES
BMI Counseling: Body mass index is 29 96 kg/m²  The BMI is above normal  Nutrition recommendations include reducing portion sizes, decreasing overall calorie intake, 3-5 servings of fruits/vegetables daily, reducing fast food intake, consuming healthier snacks and decreasing soda and/or juice intake  Exercise recommendations include moderate aerobic physical activity for 150 minutes/week  ADULT ANNUAL PHYSICAL  [facility]    NAME: Roxann Arce  AGE: 43 y o  SEX: female  : 1985     DATE: 2021     Assessment and Plan:  1  PAP through GYN  2  Obtain fasting labs when able  3  Immunizations UTD  4  F/u in 6 weeks for recheck or sooner PRN  Problem List Items Addressed This Visit     None      Visit Diagnoses     BMI 29 0-29 9,adult    -  Primary    Annual physical exam              Immunizations and preventive care screenings were discussed with patient today  Appropriate education was printed on patient's after visit summary  Counseling:  Alcohol/drug use: discussed moderation in alcohol intake, the recommendations for healthy alcohol use, and avoidance of illicit drug use  Dental Health: discussed importance of regular tooth brushing, flossing, and dental visits  Injury prevention: discussed safety/seat belts, safety helmets, smoke detectors, carbon dioxide detectors, and smoking near bedding or upholstery  Sexual health: discussed sexually transmitted diseases, partner selection, use of condoms, avoidance of unintended pregnancy, and contraceptive alternatives  · Exercise: the importance of regular exercise/physical activity was discussed  Recommend exercise 3-5 times per week for at least 30 minutes  BMI Counseling: Body mass index is 29 96 kg/m²   The BMI is above normal  Nutrition recommendations include decreasing portion sizes, encouraging healthy choices of fruits and vegetables, decreasing fast food intake, consuming healthier snacks, limiting drinks that contain sugar, moderation in carbohydrate intake, increasing intake of lean protein, reducing intake of saturated and trans fat and reducing intake of cholesterol  Exercise recommendations include moderate physical activity 150 minutes/week  No pharmacotherapy was ordered  No follow-ups on file  Chief Complaint:     Chief Complaint   Patient presents with    Physical Exam      History of Present Illness:     Adult Annual Physical   Patient here for a comprehensive physical exam  The patient reports no problems  Diet and Physical Activity  · Diet/Nutrition: well balanced diet, consuming 3-5 servings of fruits/vegetables daily and adequate fiber intake  · Exercise: moderate cardiovascular exercise, 3-4 times a week on average and 30-60 minutes on average  Depression Screening  PHQ-9 Depression Screening    PHQ-9:   Frequency of the following problems over the past two weeks:      Little interest or pleasure in doing things: 0 - not at all  Feeling down, depressed, or hopeless: 0 - not at all  PHQ-2 Score: 0       General Health  · Sleep: sleeps well and gets 7-8 hours of sleep on average  · Hearing: normal - bilateral   · Vision: no vision problems and most recent eye exam >1 year ago  · Dental: regular dental visits, brushes teeth twice daily and flosses teeth occasionally  /GYN Health  · Last menstrual period: 08/16/21  · Contraceptive method: hx of tubal ligation  · History of STDs?: yes, HPV     Review of Systems:     Review of Systems   Constitutional: Negative for chills and fever  HENT: Negative for ear pain and sore throat  Eyes: Negative for pain and visual disturbance  Respiratory: Negative for cough and shortness of breath  Cardiovascular: Negative for chest pain and palpitations  Gastrointestinal: Negative for abdominal pain and vomiting  Genitourinary: Negative for dysuria and hematuria  Musculoskeletal: Negative for arthralgias and back pain  Skin: Negative for color change and rash  Neurological: Negative for seizures and syncope  All other systems reviewed and are negative       Past Medical History:     Past Medical History:   Diagnosis Date    Abnormal Pap smear of cervix     AMA (advanced maternal age) multigravida 33+     Cystic fibrosis carrier     Disease of thyroid gland     hashimotos thyroiditis, nodule, hypo    Female infertility     3 years of infertilty treatments; this is a spontaneous pregnancy    HPV (human papilloma virus) infection     Migraine     PVC (premature ventricular contraction)     daily had them intermittently    Varicella     childhood    Vitiligo       Past Surgical History:     Past Surgical History:   Procedure Laterality Date     SECTION  2013     SECTION  2019    COLPOSCOPY      DILATION AND CURETTAGE OF UTERUS      age 19 with TOP, and SAB in 2017    OPERATIVE HYSTEROSCOPY      removal of septum    MA  DELIVERY ONLY N/A 2019    Procedure:  SECTION () REPEAT;  Surgeon: Amber Aaron MD;  [facility]: [facility];  Service: Obstetrics    MA  DELIVERY ONLY N/A 10/27/2020    Procedure: Poly Mulling () REPEAT;  Surgeon: Lesley Henriquez MD;  [facility];  Service: Obstetrics    MA Sydney Barrington TUBE W/ Bilateral 10/27/2020    Procedure: LIGATION/COAGULATION TUBAL;  Surgeon: Lesley Henriquez MD;  [facility];  Service: Obstetrics    REDUCTION MAMMAPLASTY        Social History:     Social History     Socioeconomic History    Marital status: /Civil Union     Spouse name: None    Number of children: None    Years of education: None    Highest education level: None   Occupational History    None   Tobacco Use    Smoking status: Never Smoker    Smokeless tobacco: Never Used   Vaping Use    Vaping Use: Never used   Substance and Sexual Activity    Alcohol use: Yes     Alcohol/week: 2 0 standard drinks     Types: 2 Cans of beer per week    Drug use: Never    Sexual activity: Yes     Partners: Male     Birth control/protection: None   Other Topics Concern    None   Social History Narrative    CAFFEINE USE     Social Determinants of Health     Financial Resource Strain:     Difficulty of Paying Living Expenses:    Food Insecurity:     Worried About Running Out of Food in the Last Year:     [address] in the Last Year:    Transportation Needs:     Lack of Transportation (Medical):      Lack of Transportation (Non-Medical):    Physical Activity:     Days of Exercise per Week:     Minutes of Exercise per Session:    Stress:     Feeling of Stress :    Social Connections:     Frequency of Communication with Friends and Family:     Frequency of Social Gatherings with Friends and Family:     Attends Synagogue Services:     Active Member of Clubs or Organizations:     Attends Club or Organization Meetings:     Marital Status:    Intimate Partner Violence:     Fear of Current or Ex-Partner:     Emotionally Abused:     Physically Abused:     Sexually Abused:       Family History:     Family History   Problem Relation Age of Onset    Hypertension Mother     Multiple sclerosis Mother     Hashimoto's thyroiditis Mother     Factor V Leiden deficiency Mother     Other Mother         ms    Thyroid disease Sister     Factor V Leiden deficiency Sister     Hashimoto's thyroiditis Brother     Lupus Brother     No Known Problems Father     No Known Problems Daughter     COPD Maternal Grandmother     Deep vein thrombosis Maternal Grandfather     Diabetes Paternal Grandmother         TYPE 2    Cancer Paternal Grandfather         [email or web address]    No Known Problems Son     Factor V Leiden deficiency Sister     Celiac disease Sister     Cystic fibrosis Maternal Aunt       Current Medications:     Current Outpatient Medications Medication Sig Dispense Refill    cholecalciferol (VITAMIN D3) 25 mcg (1,000 units) tablet Take 4,000 units daily   spironolactone (ALDACTONE) 100 mg tablet take 1 pill daily for 1 week then 2 pills daily 60 tablet 2    Synthroid 137 MCG tablet Take 1 tablet (137 mcg total) by mouth daily 90 tablet 3    tretinoin (RETIN-A) 0 025 % cream Apply topically daily at bedtime Spread one pea-sized amount of medication over entire face about one hour before bedtime  45 g 4     No current facility-administered medications for this visit  Allergies:     No Known Allergies   Physical Exam:     /70 (BP Location: Left arm, Patient Position: Sitting, Cuff Size: Standard)   Pulse 84   Resp 16   Ht 5' 8 75" (1 746 m)   Wt 91 4 kg (201 lb 6 4 oz)   Breastfeeding No   BMI 29 96 kg/m²     Physical Exam  Vitals and nursing note reviewed  Constitutional:       General: She is not in acute distress  Appearance: She is well-developed  She is not ill-appearing  HENT:      Head: Normocephalic and atraumatic  Eyes:      Conjunctiva/sclera: Conjunctivae normal    Neck:      Vascular: No carotid bruit  Cardiovascular:      Rate and Rhythm: Normal rate and regular rhythm  Pulses: Normal pulses  Heart sounds: Normal heart sounds  No murmur heard  Pulmonary:      Effort: Pulmonary effort is normal  No respiratory distress  Breath sounds: Normal breath sounds  No wheezing  Abdominal:      General: There is no distension  Palpations: Abdomen is soft  There is no mass  Tenderness: There is no abdominal tenderness  There is no guarding or rebound  Hernia: No hernia is present  Musculoskeletal:         General: Normal range of motion  Cervical back: Normal range of motion and neck supple  Right lower leg: No edema  Left lower leg: No edema  Lymphadenopathy:      Cervical: No cervical adenopathy  Skin:     General: Skin is warm and dry        Capillary Refill: Capillary refill takes less than 2 seconds  Neurological:      General: No focal deficit present  Mental Status: She is alert and oriented to person, place, and time  Mental status is at baseline  Motor: No weakness  Gait: Gait normal    Psychiatric:         Mood and Affect: Mood normal          Behavior: Behavior normal          Thought Content:  Thought content normal          Judgment: Judgment normal           Meghna Lorenzo, [address] [facility]

## 2021-08-27 NOTE — PATIENT INSTRUCTIONS

## 2021-08-27 NOTE — PROGRESS NOTES
Assessment/Plan:     Diagnoses and all orders for this visit:    BMI 29 0-29 9,adult    We discussed appropriate lifestyle recommendations & goal BMI range  Pt was educated on appropriate diet and exercise modifications  Acne vulgaris  -     spironolactone (ALDACTONE) 50 mg tablet; Take 1 tablet daily    Refill prescribed  Screening for diabetes mellitus  -     Hemoglobin A1C; Future    Anemia, unspecified type  -     CBC and differential; Future    Last Hgb 9 9 after  delivery in 2020  Level never rechecked  CBC ordered  Screening for metabolic disorder  -     Comprehensive metabolic panel; Future    Vitamin D deficiency  -     Vitamin D 25 hydroxy; Future    Last level at 19 4 on 21  Pt taking Vitamin D supplementation  Vitamin D level ordered to be checked  Screening for lipid disorders  -     Lipid panel; Future    Generalized anxiety disorder  -     sertraline (ZOLOFT) 25 mg tablet; Take 1 tablet (25 mg total) by mouth daily    We will start patient on Sertraline 25mg QD for anxiety  Medication and s/e reviewed  If feeling well, she can increase to (2) 25 mg tablets after her first two weeks  She is to RTO in 6 weeks for a recheck  Patient states they understand and agree with treatment plan  Palpable mass of soft tissue of shoulder  -     US head neck soft tissue; Future      Soft moveable non-tender mass located on left shoulder approx 1 5 cm x 1 5 cm  US ordered to confirm whether this may be lipoma, cyst or something else  Pt notified that we will contact her with results once available  Pt to f/u in 6 weeks for recheck or sooner PRN  Subjective:      Patient ID: Orion Laureano is a 43 y o  female  Pt presents for her annual physical   She also has hx of hypothyroidism which she is due for TSH for  Additionally, pt notes a soft mass on the top of her left shoulder which she would like evaluated    Pt also notes that she has been having increased anxiety, especially to things she wouldn't normally be anxious about  While talking with her friends, they noted how beneficial Sertraline was  She would like to try medication to see if this helps  The following portions of the patient's history were reviewed and updated as appropriate: allergies, current medications, past family history, past medical history, past social history, past surgical history and problem list     Review of Systems   Constitutional: Negative for chills and fever  HENT: Negative for ear pain and sore throat  Eyes: Negative for pain and visual disturbance  Respiratory: Negative for cough and shortness of breath  Cardiovascular: Negative for chest pain and palpitations  Gastrointestinal: Negative for abdominal pain and vomiting  Genitourinary: Negative for dysuria and hematuria  Musculoskeletal: Negative for arthralgias and back pain  Skin: Negative for color change and rash  Neurological: Negative for seizures and syncope  Psychiatric/Behavioral: The patient is nervous/anxious  All other systems reviewed and are negative  Objective:      /70 (BP Location: Left arm, Patient Position: Sitting, Cuff Size: Standard)   Pulse 84   Resp 16   Ht 5' 8 75" (1 746 m)   Wt 91 4 kg (201 lb 6 4 oz)   Breastfeeding No   BMI 29 96 kg/m²          Physical Exam  Vitals reviewed  Constitutional:       Appearance: Normal appearance  HENT:      Head: Normocephalic  Cardiovascular:      Rate and Rhythm: Normal rate and regular rhythm  Pulses: Normal pulses  Heart sounds: Normal heart sounds  Pulmonary:      Effort: Pulmonary effort is normal       Breath sounds: Normal breath sounds  Abdominal:      General: Bowel sounds are normal       Palpations: Abdomen is soft  Musculoskeletal:         General: Normal range of motion  Skin:     General: Skin is warm and dry            Neurological:      Mental Status: She is alert and oriented to person, place, and time  Mental status is at baseline     Psychiatric:         Mood and Affect: Mood normal          Behavior: Behavior normal

## 2021-09-15 ENCOUNTER — APPOINTMENT (OUTPATIENT)
Dept: LAB | Facility: CLINIC | Age: 36
End: 2021-09-15
Payer: COMMERCIAL

## 2021-09-15 DIAGNOSIS — D64.9 ANEMIA, UNSPECIFIED TYPE: ICD-10-CM

## 2021-09-15 DIAGNOSIS — E55.9 VITAMIN D DEFICIENCY: ICD-10-CM

## 2021-09-15 DIAGNOSIS — Z13.220 SCREENING FOR LIPID DISORDERS: ICD-10-CM

## 2021-09-15 DIAGNOSIS — Z13.1 SCREENING FOR DIABETES MELLITUS: ICD-10-CM

## 2021-09-15 DIAGNOSIS — Z13.228 SCREENING FOR METABOLIC DISORDER: ICD-10-CM

## 2021-09-15 DIAGNOSIS — E03.9 HYPOTHYROIDISM, UNSPECIFIED TYPE: ICD-10-CM

## 2021-09-15 LAB
25(OH)D3 SERPL-MCNC: 35.8 NG/ML (ref 30–100)
ALBUMIN SERPL BCP-MCNC: 3.8 G/DL (ref 3.5–5)
ALP SERPL-CCNC: 79 U/L (ref 46–116)
ALT SERPL W P-5'-P-CCNC: 22 U/L (ref 12–78)
ANION GAP SERPL CALCULATED.3IONS-SCNC: 5 MMOL/L (ref 4–13)
AST SERPL W P-5'-P-CCNC: 14 U/L (ref 5–45)
BASOPHILS # BLD AUTO: 0.05 THOUSANDS/ΜL (ref 0–0.1)
BASOPHILS NFR BLD AUTO: 1 % (ref 0–1)
BILIRUB SERPL-MCNC: 0.63 MG/DL (ref 0.2–1)
BUN SERPL-MCNC: 16 MG/DL (ref 5–25)
CALCIUM SERPL-MCNC: 9.3 MG/DL (ref 8.3–10.1)
CHLORIDE SERPL-SCNC: 111 MMOL/L (ref 100–108)
CHOLEST SERPL-MCNC: 197 MG/DL (ref 50–200)
CO2 SERPL-SCNC: 22 MMOL/L (ref 21–32)
CREAT SERPL-MCNC: 1.11 MG/DL (ref 0.6–1.3)
EOSINOPHIL # BLD AUTO: 0.16 THOUSAND/ΜL (ref 0–0.61)
EOSINOPHIL NFR BLD AUTO: 2 % (ref 0–6)
ERYTHROCYTE [DISTWIDTH] IN BLOOD BY AUTOMATED COUNT: 13 % (ref 11.6–15.1)
EST. AVERAGE GLUCOSE BLD GHB EST-MCNC: 103 MG/DL
GFR SERPL CREATININE-BSD FRML MDRD: 63 ML/MIN/1.73SQ M
GLUCOSE P FAST SERPL-MCNC: 98 MG/DL (ref 65–99)
HBA1C MFR BLD: 5.2 %
HCT VFR BLD AUTO: 45.7 % (ref 34.8–46.1)
HDLC SERPL-MCNC: 48 MG/DL
HGB BLD-MCNC: 14.8 G/DL (ref 11.5–15.4)
IMM GRANULOCYTES # BLD AUTO: 0.02 THOUSAND/UL (ref 0–0.2)
IMM GRANULOCYTES NFR BLD AUTO: 0 % (ref 0–2)
LDLC SERPL CALC-MCNC: 126 MG/DL (ref 0–100)
LYMPHOCYTES # BLD AUTO: 2.21 THOUSANDS/ΜL (ref 0.6–4.47)
LYMPHOCYTES NFR BLD AUTO: 29 % (ref 14–44)
MCH RBC QN AUTO: 28.8 PG (ref 26.8–34.3)
MCHC RBC AUTO-ENTMCNC: 32.4 G/DL (ref 31.4–37.4)
MCV RBC AUTO: 89 FL (ref 82–98)
MONOCYTES # BLD AUTO: 0.56 THOUSAND/ΜL (ref 0.17–1.22)
MONOCYTES NFR BLD AUTO: 7 % (ref 4–12)
NEUTROPHILS # BLD AUTO: 4.73 THOUSANDS/ΜL (ref 1.85–7.62)
NEUTS SEG NFR BLD AUTO: 61 % (ref 43–75)
NONHDLC SERPL-MCNC: 149 MG/DL
NRBC BLD AUTO-RTO: 0 /100 WBCS
PLATELET # BLD AUTO: 213 THOUSANDS/UL (ref 149–390)
PMV BLD AUTO: 11.2 FL (ref 8.9–12.7)
POTASSIUM SERPL-SCNC: 4.3 MMOL/L (ref 3.5–5.3)
PROT SERPL-MCNC: 7.7 G/DL (ref 6.4–8.2)
RBC # BLD AUTO: 5.14 MILLION/UL (ref 3.81–5.12)
SODIUM SERPL-SCNC: 138 MMOL/L (ref 136–145)
T4 FREE SERPL-MCNC: 1.01 NG/DL (ref 0.76–1.46)
TRIGL SERPL-MCNC: 113 MG/DL
TSH SERPL DL<=0.05 MIU/L-ACNC: 5.65 UIU/ML (ref 0.36–3.74)
WBC # BLD AUTO: 7.73 THOUSAND/UL (ref 4.31–10.16)

## 2021-09-15 PROCEDURE — 84443 ASSAY THYROID STIM HORMONE: CPT

## 2021-09-15 PROCEDURE — 80053 COMPREHEN METABOLIC PANEL: CPT

## 2021-09-15 PROCEDURE — 82306 VITAMIN D 25 HYDROXY: CPT

## 2021-09-15 PROCEDURE — 83036 HEMOGLOBIN GLYCOSYLATED A1C: CPT

## 2021-09-15 PROCEDURE — 84439 ASSAY OF FREE THYROXINE: CPT

## 2021-09-15 PROCEDURE — 36415 COLL VENOUS BLD VENIPUNCTURE: CPT

## 2021-09-15 PROCEDURE — 80061 LIPID PANEL: CPT

## 2021-09-15 PROCEDURE — 85025 COMPLETE CBC W/AUTO DIFF WBC: CPT

## 2021-09-23 ENCOUNTER — TELEPHONE (OUTPATIENT)
Dept: DERMATOLOGY | Facility: CLINIC | Age: 36
End: 2021-09-23

## 2021-09-23 NOTE — TELEPHONE ENCOUNTER
Pt is calling to get refill on prescription that Dr Chantal Pugh provided to her through [facility] and it is a compound topical prescription, it is not something that is in her chart to be able to refill  Pt read label:  SM 20, Hyquinon 8%, tretinoin 1%, Kojic 1%, niacinamide 4%, sluocinolone  25%  Please advise if you can refill a prescription for pt  [facility]     Thank you!

## 2021-09-27 DIAGNOSIS — E03.9 HYPOTHYROIDISM, UNSPECIFIED TYPE: ICD-10-CM

## 2021-09-27 RX ORDER — LEVOTHYROXINE SODIUM 137 MCG
137 TABLET ORAL DAILY
Qty: 90 TABLET | Refills: 1 | Status: SHIPPED | OUTPATIENT
Start: 2021-09-27 | End: 2022-08-02 | Stop reason: SDUPTHER

## 2021-09-27 NOTE — TELEPHONE ENCOUNTER
Forrest Roberts,    I tried calling the patient back to discuss this medication  There was no answer so I left a message       Thanks,  RINA Home	Mobile

## 2021-10-28 ENCOUNTER — OFFICE VISIT (OUTPATIENT)
Dept: FAMILY MEDICINE CLINIC | Facility: CLINIC | Age: 36
End: 2021-10-28
Payer: COMMERCIAL

## 2021-10-28 VITALS
HEART RATE: 83 BPM | BODY MASS INDEX: 30.33 KG/M2 | WEIGHT: 204.8 LBS | DIASTOLIC BLOOD PRESSURE: 74 MMHG | RESPIRATION RATE: 15 BRPM | HEIGHT: 69 IN | SYSTOLIC BLOOD PRESSURE: 120 MMHG

## 2021-10-28 DIAGNOSIS — F41.1 GENERALIZED ANXIETY DISORDER: Primary | ICD-10-CM

## 2021-10-28 DIAGNOSIS — Z23 NEED FOR PROPHYLACTIC VACCINATION AND INOCULATION AGAINST INFLUENZA: ICD-10-CM

## 2021-10-28 PROCEDURE — 99213 OFFICE O/P EST LOW 20 MIN: CPT | Performed by: NURSE PRACTITIONER

## 2021-10-28 PROCEDURE — 90686 IIV4 VACC NO PRSV 0.5 ML IM: CPT

## 2021-10-28 PROCEDURE — 90471 IMMUNIZATION ADMIN: CPT

## 2021-10-28 RX ORDER — SERTRALINE HYDROCHLORIDE 25 MG/1
25 TABLET, FILM COATED ORAL DAILY
Qty: 90 TABLET | Refills: 2 | Status: SHIPPED | OUTPATIENT
Start: 2021-10-28

## 2021-11-30 ENCOUNTER — HOSPITAL ENCOUNTER (OUTPATIENT)
Dept: ULTRASOUND IMAGING | Facility: HOSPITAL | Age: 36
Discharge: HOME | End: 2021-11-30
Payer: COMMERCIAL

## 2021-11-30 DIAGNOSIS — M79.89 PALPABLE MASS OF SOFT TISSUE OF SHOULDER: ICD-10-CM

## 2021-11-30 PROCEDURE — 76705 ECHO EXAM OF ABDOMEN: CPT

## 2022-01-17 ENCOUNTER — OFFICE VISIT (OUTPATIENT)
Dept: DERMATOLOGY | Facility: CLINIC | Age: 37
End: 2022-01-17
Payer: COMMERCIAL

## 2022-01-17 VITALS — WEIGHT: 210 LBS | BODY MASS INDEX: 31.24 KG/M2 | TEMPERATURE: 97.3 F

## 2022-01-17 DIAGNOSIS — D22.70 MULTIPLE BENIGN MELANOCYTIC NEVI OF UPPER AND LOWER EXTREMITIES AND TRUNK: Primary | ICD-10-CM

## 2022-01-17 DIAGNOSIS — L81.4 SOLAR LENTIGO: ICD-10-CM

## 2022-01-17 DIAGNOSIS — D17.1 LIPOMA OF TORSO: ICD-10-CM

## 2022-01-17 DIAGNOSIS — D22.60 MULTIPLE BENIGN MELANOCYTIC NEVI OF UPPER AND LOWER EXTREMITIES AND TRUNK: Primary | ICD-10-CM

## 2022-01-17 DIAGNOSIS — D22.5 MULTIPLE BENIGN MELANOCYTIC NEVI OF UPPER AND LOWER EXTREMITIES AND TRUNK: Primary | ICD-10-CM

## 2022-01-17 DIAGNOSIS — L71.9 ROSACEA: ICD-10-CM

## 2022-01-17 PROCEDURE — 99214 OFFICE O/P EST MOD 30 MIN: CPT | Performed by: DERMATOLOGY

## 2022-01-17 NOTE — PATIENT INSTRUCTIONS
LENTIGO    Assessment and Plan:  Based on a thorough discussion of this condition and the management approach to it (including a comprehensive discussion of the known risks, side effects and potential benefits of treatment), the patient (family) agrees to implement the following specific plan:   Monitor for changes    Continue Hydroquinone cream (through skin medicinals) as needed for dark spots   Continue tretinoin cream as needed to face     What is a lentigo? A lentigo is a pigmented flat or slightly raised lesion with a clearly defined edge  Unlike an ephelis (freckle), it does not fade in the winter months  There are several kinds of lentigo  The name lentigo originally referred to its appearance resembling a small lentil  The plural of lentigo is lentigines, although lentigos is also in common use  Who gets lentigines? Lentigines can affect males and females of all ages and races  Solar lentigines are especially prevalent in fair skinned adults  Lentigines associated with syndromes are present at birth or arise during childhood  What causes lentigines? Common forms of lentigo are due to exposure to ultraviolet radiation:   Sun damage including sunburn    Indoor tanning    Phototherapy, especially photochemotherapy (PUVA)    Ionizing radiation, eg radiation therapy, can also cause lentigines  Several familial syndromes associated with widespread lentigines originate from mutations in Lamin-MAP kinase, mTOR signaling and PTEN pathways  What are the clinical features of lentigines? Lentigines have been classified into several different types depending on what they look like, where they appear on the body, causative factors, and whether they are associated to other diseases or conditions  Lentigines may be solitary or more often, multiple  Most lentigines are smaller than 5 mm in diameter      Lentigo simplex   A precursor to junctional naevus    Arises during childhood and early adult life  Found on trunk and limbs    Small brown round or oval macule or thin plaque    Jagged or smooth edge    May have a dry surface    May disappear in time  Solar lentigo   A precursor to seborrhoeic keratosis    Found on chronically sun exposed sites such as hands, face, lower legs    May also follow sunburn to shoulders    Yellow, light or dark brown regular or irregular macule or thin plaque    May have a dry surface    Often has moth-eaten outline    Can slowly enlarge to several centimeters in diameter    May disappear, often through the process known as lichenoid keratosis    When atypical in appearance, may be difficult to distinguish from melanoma in situ  Ink spot lentigo   Also known as reticulated lentigo    Few in number compared to solar lentigines    Follows sunburn in very fair skinned individuals    Dark brown to black irregular ink spot-like macule  PUVA lentigo   Similar to ink spot lentigo but follows photochemotherapy (PUVA)    Location anywhere exposed to PUVA  Tanning bed lentigo   Similar to ink spot lentigo but follows indoor tanning    Location anywhere exposed to tanning bed  Radiation lentigo   Occurs in site of irradiation (accidental or therapeutic)    Associated with late-stage radiation dermatitis: epidermal atrophy, subcutaneous fibrosis, keratosis, telangiectasias  Melanotic macule   Mucosal surfaces or adjacent glabrous skin eg lip, vulva, penis, anus    Light to dark brown    Also called mucosal melanosis  Generalised lentigines   Found on any exposed or covered site from early childhood    Small macules may merge to form larger patches    Not associated with a syndrome    Also called lentigines profusa, multiple lentigines  Agminated lentigines   Naevoid eruption of lentigos confined to a single segmental area    Sharp demarcation in midline    May have associated neurological and developmental abnormalities  Patterned lentigines   Inherited tendency to lentigines on face, lips, buttocks, palms, soles    Recognised mainly in people of  ethnicity  Centrofacial neurodysraphic lentiginosis  Associated with mental retardation  Lentiginosis syndromes   Syndromes include LEOPARD/[place], Peutz-Jeghers, Laugier-Hunziker, Moynahan, Xeroderma pigmentosum, myxoma syndromes (HART, NAME, Jones), Ruvalcaba-Myhre-David, Bannayan-Zonnana syndrome, Cowden disease (multiple hamartoma syndrome )    Inheritance is autosomal dominant; sporadic cases common    Widespread lentigines present at birth or arise in early childhood    Associated with neural, endocrine, and mesenchymal tumors    How is the diagnosis made? Lentigines are usually diagnosed clinically by their typical appearance  Concern regarding possibility of melanoma may lead to:   Dermatoscopy    Diagnostic excision biopsy    Histopathology of a lentigo shows:   Thickened epidermis    An increased number of melanocytes along the basal layer of epidermis    Unlike junctional melanocytic naevus, there are no nests of melanocytes    Increased melanin pigment within the keratinocytes    Additional features depending on type of lentigo    In contrast, an ephelis (freckle) shows sun-induced increased melanin within the keratinocytes, without an increase in number of cells  What is the treatment for lentigines? Most lentigines are left alone  Attempts to lighten them may not be successful  The following approaches are used:   SPF 50+ broad-spectrum sunscreen    Hydroquinone bleaching cream    Alpha hydroxy acids    Vitamin C    Retinoids    Azelaic acid    Chemical peels  Individual lesions can be permanently removed using:   Cryotherapy    Intense pulsed light    Pigment lasers    How can lentigines be prevented? Lentigines associated with exposure ultraviolet radiation can be prevented by very careful sun protection   Clothing is more successful at preventing new lentigines than are sunscreens  What is the outlook for lentigines? Lentigines usually persist  They may increase in number with age and sun exposure  Some in sun-protected sites may fade and disappear  MELANOCYTIC NEVI ("Moles")    Assessment and Plan:  Based on a thorough discussion of this condition and the management approach to it (including a comprehensive discussion of the known risks, side effects and potential benefits of treatment), the patient (family) agrees to implement the following specific plan:   Monitor for changes   When outside we recommend using a wide brim hat, sunglasses, long sleeve and pants, sunscreen with SPF 55+ with reapplication every 2 hours, or SPF specific clothing     skin exam recommended every 2 years      Melanocytic Nevi  Melanocytic nevi ("moles") are caused by collections of the color producing skin cells, or melanocytes, in 1 area in the skin  They can range in color from pink to dark brown and be either raised or flat  Some moles are present at birth (I e , "congenital nevi"), while others come up later in life (i e , "acquired nevi")  Nicholos Gent exposure also stimulates the body to make more moles, ie the more sun you get the more moles you'll grow  Clinically distinguishing a healthy mole from melanoma may be difficult  The "ABCDE's" of moles have been suggested as a means of helping to alert a person to a suspicious mole and the possible increased risk of melanoma  Asymmetry: Healthy moles tend to be symmetric, while melanomas are often asymmetric  Asymmetry means if you draw a line through the mole, the two halves do not match in color, size, shape, or surface texture Any mole that starts to demonstrate "asymmetry" should be examined promptly by a board certified dermatologist      Border: Healthy moles tend to have discrete, even borders  The border of a melanoma often blends into the normal skin and does not sharply delineate the mole from normal skin    Any mole that starts to demonstrate "uneven borders" should be examined promptly     Color: Healthy moles tend to be one color throughout  Melanomas tend to be made up of different colors ranging from dark black, blue, white, or red  Any mole that demonstrates a color change should be examined promptly    Diameter: Healthy moles tend to be smaller than 0 6 cm in size; an exception are "congenital nevi" that can be larger  Melanomas tend to grow and can often be greater than 0 6 cm (1/4 of an inch, or the size of a pencil eraser)  This is only a guideline, and many normal moles may be larger than 0 6 cm without being unhealthy  Any mole that starts to change in size (small to bigger or bigger to smaller) should be examined promptly    Evolving: Healthy moles tend to "stay the same "  Melanomas may often show signs of change or evolution such as a change in size, shape, color, or elevation  Any mole that starts to itch, bleed, crust, burn, hurt, or ulcerate or demonstrate a change or evolution should be examined promptly by a board certified dermatologist       What are atypical moles or dysplastic nevi? Dysplastic moles are moles that have some of the ABCDE  changes listed above but  are not cancerous  Sometimes a biopsy and microscopic examination are needed to determine the difference  They may indicate an increased risk of melanoma in that person, especially if there is a family history of melanoma  What is a Melanoma? The main concern when looking at a new or changing mole it to evaluate whether it may be a melanoma  The appearance of a "new mole" remains one of the most reliable methods for identifying a malignant melanoma  A melanoma is a type of skin cancer that can be deadly if it spreads throughout the body  The prognosis of a Melanoma depends on how deep it has penetrated in the skin    If caught early, they generally will not have had time to grow into the deeper layers of the skin and they cure rate is then very high  Once the melanoma grows deeper into the skin, the cure rate drops dramatically  Therefore, early detection and removal of a malignant melanoma results in a much better chance of complete cure  LIPOMA    Assessment and Plan:  Based on a thorough discussion of this condition and the management approach to it (including a comprehensive discussion of the known risks, side effects and potential benefits of treatment), the patient (family) agrees to implement the following specific plan:   Reassured benign    Excision discussed if becomes bothersome   Injection versus liposuction discussed to dissolve contents (through plastics)     ROSACEA    Assessment and Plan:  Based on a thorough discussion of this condition and the management approach to it (including a comprehensive discussion of the known risks, side effects and potential benefits of treatment), the patient (family) agrees to implement the following specific plan:   Intense pulse light (IPL) or photo facial discussed    Start metronidazole 0 75% cream twice a day to face for redness    Follow up as needed     Rosacea is a chronic rash affecting the mid-face including the nose, cheeks, chin, forehead, and eyelids  The incidence is usually greatest between the ages of 30-60 years and is more common in people with fair skin  Common characteristics include redness, telangiectasias, papules and pustules over affected areas  Rosacea may look similar to acne, but there is a lack of comedones  Occasionally the eyes may also be involved in ocular rosacea  In advanced disease, enlargement of the sebaceous glands in the nose, termed rhinophyma, may be present  Rosacea results in red spots (papules) and sometimes pustules over the face, but unlike acne there are no blackheads, whiteheads, or cystic nodules  Patients often experience increased facial flushing with prominent blood vessels (erythematotelangiectatic rosacea) and dry, sensitive skin   These symptoms are exacerbated by sun exposure, hot or spicy foods, topical steroids and oil-based facial products  In ocular rosacea, eyelids may be red and sore due to conjunctivitis, keratitis, and episcleritis  If rhinophyma develops due to enlargement of sebaceous glands, the patient may have an enlarged and irregularly shaped nose with prominent pores  In rosacea that is refractory to treatment, patients can develop persistent redness and swelling of the face due to lymphatic obstruction (Morbihan disease)  Distribution around the cheeks may be confused with the malar or butterfly rash of lupus  However, the rash of lupus spares the nasal creases and lacks papules and pustules  If signs of photosensitivity, oral ulcers, arthritis, and kidney dysfunction are present then consider referral to a rheumatologist      There are many potential causes of rosacea including genetic, environmental, vascular, and inflammatory factors   These include, but are not limited to:   Chronic exposure to ultraviolet radiation    Increased immune responses in the form of cathelicidins that promote vessel dilation and infiltration with white blood cells (neutrophils) into the dermis   Increased matrix metalloproteinases such as collagen and elastase that remodel normal tissue may contribute to inflammation of the skin making it thicker and harder   There is some evidence to suggest that increased numbers of demodex mites on patient skin may contribute to rosacea papules     General Treatment Approach    Avoid exacerbating factors such as heat, spicy foods, and alcohol    Use daily SPF30+ sunscreen and other methods of coverage for sun protection   Use water-based make-up    Avoid applying topical steroids to affected areas as they can cause perioral dermatitis and exacerbate rosacea     Topical Treatment Approach   Metronidazole cream or gel by itself or in combination with oral antibiotics for more severe cases   Azelaic acid cream or lotion is effective for mild inflammatory rosacea when applied twice daily to affected areas   Brimonidine gel and oxymetazoline hydrochloride cream can reduce facial redness temporarily    Ivermectin cream can treat papulopustular rosacea by controlling demodex mites and inflammation    Pimecrolimus cream or tacrolimus ointment twice a day for 2-3 months can help reduce inflammation    Oral Treatment Approach   Antibiotics such as doxycycline, minocycline, or erythromycin for 1-3 months   Clonidine and carvedilol can help reduce facial flushing and are generally well tolerated  Common side effects include low blood pressure, gastrointestinal upset, dry eyes, blurred vision and low heart rate   Isotretinoin at low doses can be effective for long term treatment when antibiotics fail  Side effects may make it unsuitable for some patients   NSAIDs such as diclofenac can help reduce discomfort and redness in the skin       Procedural/Surgical Treatment Approach    Vascular lasers or intense pulsed light treatment may be used to treat persistent telangiectasia and papulopustular rosacea   Plastic surgery and carbon dioxide lasers may be used to treat rhinophyma

## 2022-03-07 NOTE — ASSESSMENT & PLAN NOTE
Placenta noted to be 15mm from os on L2  Discussed LLP at length  32 week US scheduled for placenta check and growth  Parent(s)

## 2022-05-25 ENCOUNTER — OFFICE VISIT (OUTPATIENT)
Dept: PODIATRY | Facility: CLINIC | Age: 37
End: 2022-05-25
Payer: COMMERCIAL

## 2022-05-25 ENCOUNTER — APPOINTMENT (OUTPATIENT)
Dept: RADIOLOGY | Facility: CLINIC | Age: 37
End: 2022-05-25
Payer: COMMERCIAL

## 2022-05-25 VITALS
SYSTOLIC BLOOD PRESSURE: 112 MMHG | DIASTOLIC BLOOD PRESSURE: 76 MMHG | HEIGHT: 69 IN | WEIGHT: 203 LBS | BODY MASS INDEX: 30.07 KG/M2

## 2022-05-25 DIAGNOSIS — M24.874 OTHER SPECIFIC JOINT DERANGEMENTS OF RIGHT FOOT, NOT ELSEWHERE CLASSIFIED: Primary | ICD-10-CM

## 2022-05-25 DIAGNOSIS — M25.571 PAIN IN JOINT OF RIGHT FOOT: ICD-10-CM

## 2022-05-25 DIAGNOSIS — M25.571 SINUS TARSI SYNDROME, RIGHT: ICD-10-CM

## 2022-05-25 PROCEDURE — 73630 X-RAY EXAM OF FOOT: CPT

## 2022-05-25 PROCEDURE — 99203 OFFICE O/P NEW LOW 30 MIN: CPT | Performed by: PODIATRIST

## 2022-05-25 PROCEDURE — 20600 DRAIN/INJ JOINT/BURSA W/O US: CPT | Performed by: PODIATRIST

## 2022-05-25 RX ORDER — TRIAMCINOLONE ACETONIDE 40 MG/ML
20 INJECTION, SUSPENSION INTRA-ARTICULAR; INTRAMUSCULAR
Status: SHIPPED | OUTPATIENT
Start: 2022-05-25

## 2022-05-25 RX ORDER — LIDOCAINE HYDROCHLORIDE 10 MG/ML
2 INJECTION, SOLUTION INFILTRATION; PERINEURAL
Status: SHIPPED | OUTPATIENT
Start: 2022-05-25

## 2022-05-25 RX ADMIN — TRIAMCINOLONE ACETONIDE 20 MG: 40 INJECTION, SUSPENSION INTRA-ARTICULAR; INTRAMUSCULAR at 13:43

## 2022-05-25 RX ADMIN — LIDOCAINE HYDROCHLORIDE 2 ML: 10 INJECTION, SOLUTION INFILTRATION; PERINEURAL at 13:43

## 2022-05-25 NOTE — PROGRESS NOTES
PATIENT:  Ana Gaston  1985       ASSESSMENT:     1  Other specific joint derangements of right foot, not elsewhere classified  Small joint arthrocentesis: R subtalar   2  Sinus tarsi syndrome, right  XR foot 3+ vw right             PLAN:  1  Patient was counseled and educated on the condition and the diagnosis  2  X-ray was obtained and personally reviewed  The radiological findings were discussed with the patient  3  The exam and symptoms are consistent with right sinus tarsi syndrome  The diagnosis, treatment options and prognosis were discussed with the patient  4  Treatment options were discussed and the patient wished to proceed with an injection  Injection was given as in the procedure  5  Instructed supportive care, home exercise, icing, and proper footwear/ arch support  6  Patient will return in 6 weeks for re-evaluation  Small joint arthrocentesis: R subtalar  Universal Protocol:  Consent: Verbal consent obtained  Risks and benefits: risks, benefits and alternatives were discussed  Consent given by: patient  Time out: Immediately prior to procedure a "time out" was called to verify the correct patient, procedure, equipment, support staff and site/side marked as required  Timeout called at: 5/25/2022 11:03 AM   Patient understanding: patient states understanding of the procedure being performed  Site marked: the operative site was marked  Patient identity confirmed: verbally with patient    Supporting Documentation  Indications: pain   Procedure Details  Location: foot - R subtalar  Needle size: 25 G  Ultrasound guidance: no  Approach: lateral  Medications administered: 20 mg triamcinolone acetonide 40 mg/mL; 2 mL lidocaine 1 %    Patient tolerance: patient tolerated the procedure well with no immediate complications            Subjective:       HPI  The patient presents with chief complaint of right foot pain for about 1 5 years    The symptoms presents around right midfoot with throbbing  Pain level is about 7 out of 10  The symptoms have been worse since the onset  Pain increases with walking and standing  The patient does not recall any injury  The patient tried OTC meds, and different shoes without relieving the pain  Denied any swelling  No associated numbness or paresthesia  No significant weakness  The following portions of the patient's history were reviewed and updated as appropriate: allergies, current medications, past family history, past medical history, past social history, past surgical history and problem list   All pertinent labs and images were reviewed        Past Medical History  Past Medical History:   Diagnosis Date    Abnormal Pap smear of cervix     AMA (advanced maternal age) multigravida 33+     Cystic fibrosis carrier     Disease of thyroid gland     hashimotos thyroiditis, nodule, hypo    Female infertility     3 years of infertilty treatments; this is a spontaneous pregnancy    HPV (human papilloma virus) infection     Migraine     PVC (premature ventricular contraction)     daily had them intermittently    Varicella     childhood    Vitiligo        Past Surgical History  Past Surgical History:   Procedure Laterality Date     SECTION       SECTION  2019    COLPOSCOPY      DILATION AND CURETTAGE OF UTERUS      age 19 with TOP, and SAB in 2017    OPERATIVE HYSTEROSCOPY      removal of septum    KS  DELIVERY ONLY N/A 2019    Procedure:  SECTION () REPEAT;  Surgeon: Romulo Newton MD;  [facility]: [facility];  Service: Obstetrics    KS  DELIVERY ONLY N/A 10/27/2020    Procedure: Ana Ramirez () REPEAT;  Surgeon: Tutu Pimentel MD;  [facility];  Service: Obstetrics    KS LIGATION,FALLOPIAN TUBE W/ Bilateral 10/27/2020    Procedure: LIGATION/COAGULATION TUBAL;  Surgeon: Tutu Pimentel MD;  [facility]; Service: Obstetrics    REDUCTION MAMMAPLASTY          Allergies:  Patient has no known allergies  Medications:  Current Outpatient Medications   Medication Sig Dispense Refill    cholecalciferol (VITAMIN D3) 25 mcg (1,000 units) tablet Take 4,000 units daily   metroNIDAZOLE (METROCREAM) 0 75 % cream Apply topically 2 (two) times a day Apply twice a day to face for redness 45 g 3    sertraline (ZOLOFT) 25 mg tablet Take 1 tablet (25 mg total) by mouth daily 90 tablet 2    spironolactone (ALDACTONE) 50 mg tablet Take 1 tablet daily      Synthroid 137 MCG tablet Take 1 tablet (137 mcg total) by mouth daily 90 tablet 1    tretinoin (RETIN-A) 0 025 % cream Apply topically daily at bedtime Spread one pea-sized amount of medication over entire face about one hour before bedtime  45 g 4     No current facility-administered medications for this visit  Social History:  Social History     Socioeconomic History    Marital status: /Civil Union     Spouse name: None    Number of children: None    Years of education: None    Highest education level: None   Occupational History    None   Tobacco Use    Smoking status: Never Smoker    Smokeless tobacco: Never Used   Vaping Use    Vaping Use: Never used   Substance and Sexual Activity    Alcohol use: Yes     Alcohol/week: 2 0 standard drinks     Types: 2 Cans of beer per week    Drug use: Never    Sexual activity: Yes     Partners: Male     Birth control/protection: None   Other Topics Concern    None   Social History Narrative    CAFFEINE USE     Social Determinants of Health     Financial Resource Strain: Not on file   Food Insecurity: Not on file   Transportation Needs: Not on file   Physical Activity: Not on file   Stress: Not on file   Social Connections: Not on file   Intimate Partner Violence: Not on file   Housing Stability: Not on file          Review of Systems   Constitutional: Negative for appetite change, chills and fever     HENT: Negative for sore throat  Respiratory: Negative  Cardiovascular: Negative  Gastrointestinal: Negative  Musculoskeletal: Negative for gait problem  Skin: Negative for rash and wound  Allergic/Immunologic: Negative for immunocompromised state  Neurological: Negative for weakness and numbness  Hematological: Negative  Psychiatric/Behavioral: Negative for behavioral problems and confusion  Objective:      /76   Ht 5' 8 75" (1 746 m)   Wt 92 1 kg (203 lb)   BMI 30 20 kg/m²          Physical Exam  Vitals reviewed  Constitutional:       General: She is not in acute distress  Appearance: She is not toxic-appearing or diaphoretic  HENT:      Head: Normocephalic and atraumatic  Eyes:      Extraocular Movements: Extraocular movements intact  Cardiovascular:      Rate and Rhythm: Normal rate and regular rhythm  Pulses: Normal pulses  Dorsalis pedis pulses are 2+ on the right side and 2+ on the left side  Posterior tibial pulses are 2+ on the right side and 2+ on the left side  Pulmonary:      Effort: Pulmonary effort is normal  No respiratory distress  Musculoskeletal:         General: Tenderness present  No swelling or signs of injury  Cervical back: Normal range of motion and neck supple  Right lower leg: No edema  Left lower leg: No edema  Right foot: No foot drop  Left foot: No foot drop  Comments: Focal pain on right sinus tarsi  No edema  Hyperpronation noted right STJ with ankle equinus  Skin:     General: Skin is warm  Capillary Refill: Capillary refill takes less than 2 seconds  Coloration: Skin is not cyanotic or mottled  Findings: No abscess, ecchymosis, erythema or wound  Nails: There is no clubbing  Neurological:      General: No focal deficit present  Mental Status: She is alert and oriented to person, place, and time  Cranial Nerves: No cranial nerve deficit  Sensory: No sensory deficit  Motor: No weakness  Coordination: Coordination normal    Psychiatric:         Mood and Affect: Mood normal          Behavior: Behavior normal          Thought Content:  Thought content normal          Judgment: Judgment normal

## 2022-06-13 DIAGNOSIS — L70.0 ACNE VULGARIS: ICD-10-CM

## 2022-06-14 DIAGNOSIS — L70.0 ACNE VULGARIS: ICD-10-CM

## 2022-06-14 RX ORDER — SPIRONOLACTONE 25 MG/1
TABLET ORAL
Qty: 90 TABLET | Refills: 0 | Status: SHIPPED | OUTPATIENT
Start: 2022-06-14

## 2022-06-14 NOTE — TELEPHONE ENCOUNTER
----- Message from Dale Gurrola sent at 6/14/2022  2:14 PM EDT -----  Regarding: Refill  Hi Payton Echols, hope all is well  Gorge Saab been taking spironolactone 25mg a day over the past six or so months, for my acne issues, and its the only thing thats every helped me  Dr Enriqueta Castaneda dermatology fellow I saw prescribed it  I was told dr Rocio Carrillo left [facility] but may be returning soon  At any rate, I need a refill  Derm isnt responding to me about it  Would you be able to refill that for me to the [facility] mail order pharm? Id appreciate if you can, let me know!  Thanks    [employer]

## 2022-07-01 ENCOUNTER — APPOINTMENT (OUTPATIENT)
Dept: RADIOLOGY | Facility: CLINIC | Age: 37
End: 2022-07-01
Payer: COMMERCIAL

## 2022-07-01 ENCOUNTER — OFFICE VISIT (OUTPATIENT)
Dept: FAMILY MEDICINE CLINIC | Facility: CLINIC | Age: 37
End: 2022-07-01
Payer: COMMERCIAL

## 2022-07-01 VITALS
HEART RATE: 64 BPM | BODY MASS INDEX: 28.56 KG/M2 | DIASTOLIC BLOOD PRESSURE: 74 MMHG | SYSTOLIC BLOOD PRESSURE: 116 MMHG | HEIGHT: 70 IN | RESPIRATION RATE: 16 BRPM | WEIGHT: 199.5 LBS

## 2022-07-01 DIAGNOSIS — R39.9 URINARY SYMPTOM OR SIGN: ICD-10-CM

## 2022-07-01 DIAGNOSIS — M54.50 ACUTE MIDLINE LOW BACK PAIN WITHOUT SCIATICA: ICD-10-CM

## 2022-07-01 DIAGNOSIS — M54.50 ACUTE MIDLINE LOW BACK PAIN WITHOUT SCIATICA: Primary | ICD-10-CM

## 2022-07-01 PROCEDURE — 72110 X-RAY EXAM L-2 SPINE 4/>VWS: CPT

## 2022-07-01 PROCEDURE — 99214 OFFICE O/P EST MOD 30 MIN: CPT | Performed by: PHYSICIAN ASSISTANT

## 2022-07-01 RX ORDER — CYCLOBENZAPRINE HCL 10 MG
10 TABLET ORAL 3 TIMES DAILY PRN
Qty: 20 TABLET | Refills: 0 | Status: SHIPPED | OUTPATIENT
Start: 2022-07-01

## 2022-07-01 RX ORDER — PREDNISONE 10 MG/1
TABLET ORAL
Qty: 20 TABLET | Refills: 0 | Status: ON HOLD | OUTPATIENT
Start: 2022-07-01 | End: 2022-10-10 | Stop reason: ALTCHOICE

## 2022-07-01 NOTE — PROGRESS NOTES
Assessment/Plan:    1  Acute midline low back pain without sciatica    - will XR lumbar region, start prednisone taper as discussed, flexeril at night, consider PT  - predniSONE 10 mg tablet; Take 4 tabs on day 1,2 with food, 3 tabs on day 3,4 with food, 2 tabs on day 5,6 with food, 1 tab on day 7,8 with food  Dispense: 20 tablet; Refill: 0  - cyclobenzaprine (FLEXERIL) 10 mg tablet; Take 1 tablet (10 mg total) by mouth 3 (three) times a day as needed for muscle spasms  Dispense: 20 tablet; Refill: 0  - XR spine lumbar minimum 4 views non injury; Future      F/u as needed    Subjective:   Chief Complaint   Patient presents with    Back Pain      Patient ID: Adrian Bazzi is a 43 y o  female  2 weeks ago did a HIIT work up, woke up the next morning and felt tight, went to the pool with kids and started really hurting, stopped  Saturday woke up same, feeling tight and progressed and got worse as day went on  Has continue on like this for 2 weeks  Lower lumbar region, tight to start, certain positions sharp, denies radiation  Urinating normally  Tried advil, aleve, motrin with no relief  Tried baclofen with no relief   Was so bad the other day had to stop and rest       The following portions of the patient's history were reviewed and updated as appropriate: allergies, current medications, past family history, past medical history, past social history, past surgical history and problem list     Past Medical History:   Diagnosis Date    Abnormal Pap smear of cervix 2007    AMA (advanced maternal age) multigravida 35+     Cystic fibrosis carrier     Disease of thyroid gland     hashimotos thyroiditis, nodule, hypo    Female infertility     3 years of infertilty treatments; this is a spontaneous pregnancy    HPV (human papilloma virus) infection     Migraine     PVC (premature ventricular contraction)     daily had them intermittently    Varicella     childhood    Vitiligo      Past Surgical History: Procedure Laterality Date     SECTION  2013     SECTION  2019    COLPOSCOPY      DILATION AND CURETTAGE OF UTERUS      age 19 with TOP, and SAB in 2017    OPERATIVE HYSTEROSCOPY      removal of septum    [place]  DELIVERY ONLY N/A 2019    Procedure:  SECTION () REPEAT;  Surgeon: Lilia James MD;  [facility];  Service: Obstetrics    [place]  DELIVERY ONLY N/A 10/27/2020    Procedure:  SECTION () REPEAT;  Surgeon: Corin Montgomery MD;  [facility];  Service: Obstetrics    [place] LIGATION,FALLOPIAN TUBE W/ Bilateral 10/27/2020    Procedure: LIGATION/COAGULATION TUBAL;  Surgeon: Corin Montgomery MD;  [facility];  Service: Obstetrics    REDUCTION MAMMAPLASTY       Family History   Problem Relation Age of Onset    Hypertension Mother     Multiple sclerosis Mother     Hashimoto's thyroiditis Mother     Factor V Leiden deficiency Mother     Other Mother         ms    Transient ischemic attack Father     Thyroid disease Sister     Factor V Leiden deficiency Sister     Factor V Leiden deficiency Sister     Celiac disease Sister     Hashimoto's thyroiditis Brother     Lupus Brother     No Known Problems Son     No Known Problems Daughter     COPD Maternal Grandmother     Deep vein thrombosis Maternal Grandfather     Diabetes Paternal Grandmother         TYPE 2    Cancer Paternal Grandfather         [email or web address]    Cystic fibrosis Maternal Aunt      Social History     Socioeconomic History    Marital status: /Civil Union     Spouse name: Not on file    Number of children: Not on file    Years of education: Not on file    Highest education level: Not on file   Occupational History    Not on file   Tobacco Use    Smoking status: Never Smoker    Smokeless tobacco: Never Used   Vaping Use    Vaping Use: Never used   Substance and Sexual Activity    Alcohol use:  Yes     Alcohol/week: 2 0 standard drinks     Types: 2 Cans of beer per week    Drug use: Never    Sexual activity: Yes     Partners: Male     Birth control/protection: None   Other Topics Concern    Not on file   Social History Narrative    CAFFEINE USE     Social Determinants of Health     Financial Resource Strain: Not on file   Food Insecurity: Not on file   Transportation Needs: Not on file   Physical Activity: Not on file   Stress: Not on file   Social Connections: Not on file   Intimate Partner Violence: Not on file   Housing Stability: Not on file       Current Outpatient Medications:     cholecalciferol (VITAMIN D3) 25 mcg (1,000 units) tablet, Take 4,000 units daily  , Disp: , Rfl:     sertraline (ZOLOFT) 25 mg tablet, Take 1 tablet (25 mg total) by mouth daily, Disp: 90 tablet, Rfl: 2    spironolactone (ALDACTONE) 25 mg tablet, Take 1 tablet daily, Disp: 90 tablet, Rfl: 0    Synthroid 137 MCG tablet, Take 1 tablet (137 mcg total) by mouth daily, Disp: 90 tablet, Rfl: 1    tretinoin (RETIN-A) 0 025 % cream, Apply topically daily at bedtime Spread one pea-sized amount of medication over entire face about one hour before bedtime  , Disp: 45 g, Rfl: 0    metroNIDAZOLE (METROCREAM) 0 75 % cream, Apply topically 2 (two) times a day Apply twice a day to face for redness, Disp: 45 g, Rfl: 3    Current Facility-Administered Medications:     lidocaine (XYLOCAINE) 1 % injection 2 mL, 2 mL, Injection, , Ector Ferguson DPM, 2 mL at 05/25/22 1343    triamcinolone acetonide (KENALOG-40) 40 mg/mL injection 20 mg, 20 mg, Intra-articular, , Ector Ferguson DPM, 20 mg at 05/25/22 1343    Review of Systems          Objective:    Vitals:    07/01/22 0947   BP: 116/74   Pulse: 64   Resp: 16   Weight: 90 5 kg (199 lb 8 oz)   Height: 5' 9 5" (1 765 m)        Physical Exam  Constitutional:       Appearance: Normal appearance  She is well-developed and normal weight  HENT:      Head: Normocephalic and atraumatic     Cardiovascular:      Rate and Rhythm: Normal rate and regular rhythm  Pulses: Normal pulses  Heart sounds: Normal heart sounds  Pulmonary:      Effort: Pulmonary effort is normal       Breath sounds: Normal breath sounds  Musculoskeletal:         General: Normal range of motion  Cervical back: Normal range of motion  Comments: Pain perceived L4/5/S1 region, SLR negative b/l, DTR +2, heel/toe walk normal, full ROM   Skin:     General: Skin is warm  Neurological:      General: No focal deficit present  Mental Status: She is alert and oriented to person, place, and time  Sensory: No sensory deficit  Motor: No weakness  Coordination: Coordination normal       Gait: Gait normal       Deep Tendon Reflexes: Reflexes normal    Psychiatric:         Mood and Affect: Mood normal          Behavior: Behavior normal          Thought Content:  Thought content normal          Judgment: Judgment normal

## 2022-08-02 DIAGNOSIS — E03.9 HYPOTHYROIDISM, UNSPECIFIED TYPE: Primary | ICD-10-CM

## 2022-09-14 ENCOUNTER — APPOINTMENT (OUTPATIENT)
Dept: LAB | Facility: CLINIC | Age: 37
End: 2022-09-14
Payer: COMMERCIAL

## 2022-09-14 ENCOUNTER — APPOINTMENT (OUTPATIENT)
Dept: RADIOLOGY | Facility: CLINIC | Age: 37
End: 2022-09-14
Payer: COMMERCIAL

## 2022-09-14 DIAGNOSIS — E03.9 HYPOTHYROIDISM, UNSPECIFIED TYPE: ICD-10-CM

## 2022-09-14 DIAGNOSIS — Z00.8 HEALTH EXAMINATION IN POPULATION SURVEY: ICD-10-CM

## 2022-09-14 DIAGNOSIS — M54.2 CERVICALGIA: ICD-10-CM

## 2022-09-14 LAB
CHOLEST SERPL-MCNC: 160 MG/DL
HDLC SERPL-MCNC: 45 MG/DL
LDLC SERPL CALC-MCNC: 98 MG/DL (ref 0–100)
NONHDLC SERPL-MCNC: 115 MG/DL
T4 FREE SERPL-MCNC: 1.13 NG/DL (ref 0.76–1.46)
TRIGL SERPL-MCNC: 83 MG/DL
TSH SERPL DL<=0.05 MIU/L-ACNC: 1.85 UIU/ML (ref 0.45–4.5)

## 2022-09-14 PROCEDURE — 36415 COLL VENOUS BLD VENIPUNCTURE: CPT

## 2022-09-14 PROCEDURE — 84439 ASSAY OF FREE THYROXINE: CPT

## 2022-09-14 PROCEDURE — 80061 LIPID PANEL: CPT

## 2022-09-14 PROCEDURE — 84443 ASSAY THYROID STIM HORMONE: CPT

## 2022-09-14 PROCEDURE — 72050 X-RAY EXAM NECK SPINE 4/5VWS: CPT

## 2022-09-14 PROCEDURE — 83036 HEMOGLOBIN GLYCOSYLATED A1C: CPT

## 2022-09-15 LAB
EST. AVERAGE GLUCOSE BLD GHB EST-MCNC: 108 MG/DL
HBA1C MFR BLD: 5.4 %

## 2022-09-26 ENCOUNTER — CONSULT (OUTPATIENT)
Dept: SURGERY | Facility: CLINIC | Age: 37
End: 2022-09-26
Payer: COMMERCIAL

## 2022-09-26 VITALS
WEIGHT: 203.4 LBS | HEIGHT: 69 IN | TEMPERATURE: 97.5 F | BODY MASS INDEX: 30.13 KG/M2 | SYSTOLIC BLOOD PRESSURE: 128 MMHG | HEART RATE: 68 BPM | DIASTOLIC BLOOD PRESSURE: 84 MMHG

## 2022-09-26 DIAGNOSIS — D17.1 LIPOMA OF TORSO: Primary | ICD-10-CM

## 2022-09-26 PROCEDURE — 99202 OFFICE O/P NEW SF 15 MIN: CPT | Performed by: SURGERY

## 2022-09-26 NOTE — ASSESSMENT & PLAN NOTE
Lipoma of left back  Plan for excision in the operating room with local and sedation  Will schedule at earliest mutual convenience

## 2022-09-26 NOTE — PROGRESS NOTES
Assessment/Plan:    Lipoma of torso  Lipoma of left back  Plan for excision in the operating room with local and sedation  Will schedule at earliest mutual convenience          Subjective:      Patient ID: Joaquim Kirby is a 42 y o  female  HPI  Patient presents with mass on her left back  She states it has been there for approximately one year and has grown slightly over that time  She denies any pain at the site  She states she now desires it to be removed  She had an ultrasound done of the mass which suggested that it was a lipoma  The following portions of the patient's history were reviewed and updated as appropriate: allergies, current medications, past family history, past medical history, past social history, past surgical history and problem list     Review of Systems   Constitutional: Negative  HENT: Negative  Eyes: Negative  Respiratory: Negative  Cardiovascular: Negative  Gastrointestinal: Negative  Endocrine: Negative  Genitourinary: Negative  Musculoskeletal: Negative  Skin: Negative  Mass of left upper back   Neurological: Negative  Psychiatric/Behavioral: Negative  Objective:      /84   Pulse 68   Temp 97 5 °F (36 4 °C) (Temporal)   Ht 5' 9" (1 753 m)   Wt 92 3 kg (203 lb 6 4 oz)   BMI 30 04 kg/m²          Physical Exam  Constitutional:       Appearance: Normal appearance  HENT:      Head: Normocephalic and atraumatic  Right Ear: External ear normal       Left Ear: External ear normal       Nose: Nose normal       Mouth/Throat:      Mouth: Mucous membranes are moist       Pharynx: Oropharynx is clear  Eyes:      Extraocular Movements: Extraocular movements intact  Conjunctiva/sclera: Conjunctivae normal       Pupils: Pupils are equal, round, and reactive to light  Cardiovascular:      Rate and Rhythm: Normal rate and regular rhythm  Pulses: Normal pulses     Pulmonary:      Effort: Pulmonary effort is normal  Abdominal:      General: Abdomen is flat  Palpations: Abdomen is soft  Tenderness: There is no abdominal tenderness  Musculoskeletal:         General: Normal range of motion  Cervical back: Normal range of motion  Skin:     General: Skin is warm and dry  Comments: 3cm mass of left upper back, non tender   Neurological:      General: No focal deficit present  Mental Status: She is alert and oriented to person, place, and time     Psychiatric:         Mood and Affect: Mood normal          Behavior: Behavior normal

## 2022-09-26 NOTE — PROGRESS NOTES
Office Visit - General Surgery  Hui Eldridge MRN: [id number]  Encounter: [id number]    Assessment and Plan  Problem List Items Addressed This Visit    None         Chief Complaint:  Hui Eldridge is a 42 y o  female who presents for Mass (Consult for a lipoma left shoulder )    Subjective      Past Medical History:   Diagnosis Date    Abnormal Pap smear of cervix     AMA (advanced maternal age) multigravida 33+     Cystic fibrosis carrier     Disease of thyroid gland     hashimotos thyroiditis, nodule, hypo    Female infertility     3 years of infertilty treatments; this is a spontaneous pregnancy    HPV (human papilloma virus) infection     Migraine     PVC (premature ventricular contraction)     daily had them intermittently    Varicella     childhood    Vitiligo        Past Surgical History:   Procedure Laterality Date     SECTION       SECTION  2019    COLPOSCOPY      DILATION AND CURETTAGE OF UTERUS      age 19 with TOP, and SAB in 2017    OPERATIVE HYSTEROSCOPY      removal of septum    OR  DELIVERY ONLY N/A 2019    Procedure:  SECTION () REPEAT;  Surgeon: Fei Neri MD;  [facility];  Service: Obstetrics    OR  DELIVERY ONLY N/A 10/27/2020    Procedure:  SECTION () REPEAT;  Surgeon: Marissa Odell MD;  [facility];  Service: Obstetrics    OR LIGATION,FALLOPIAN TUBE W/ Bilateral 10/27/2020    Procedure: LIGATION/COAGULATION TUBAL;  Surgeon: Marissa Odell MD;  [facility];  Service: Obstetrics    REDUCTION MAMMAPLASTY         Family History   Problem Relation Age of Onset    Hypertension Mother     Multiple sclerosis Mother     Hashimoto's thyroiditis Mother     Factor V Leiden deficiency Mother     Other Mother         ms    Transient ischemic attack Father     Thyroid disease Sister     Factor V Leiden deficiency Sister     Factor V Leiden deficiency Sister     Celiac disease Sister     Hashimoto's thyroiditis Brother     Lupus Brother     No Known Problems Son     No Known Problems Daughter     COPD Maternal Grandmother     Deep vein thrombosis Maternal Grandfather     Diabetes Paternal Grandmother         TYPE 2    Cancer Paternal Grandfather         [email or web address]    Cystic fibrosis Maternal Aunt        Social History     Tobacco Use    Smoking status: Never Smoker    Smokeless tobacco: Never Used   Vaping Use    Vaping Use: Never used   Substance Use Topics    Alcohol use: Yes     Alcohol/week: 2 0 standard drinks     Types: 2 Cans of beer per week    Drug use: Never        Medications  Current Outpatient Medications on File Prior to Visit   Medication Sig Dispense Refill    cholecalciferol (VITAMIN D3) 25 mcg (1,000 units) tablet Take 4,000 units daily   cyclobenzaprine (FLEXERIL) 10 mg tablet Take 1 tablet (10 mg total) by mouth 3 (three) times a day as needed for muscle spasms 20 tablet 0    predniSONE 10 mg tablet Take 4 tabs on day 1,2 with food, 3 tabs on day 3,4 with food, 2 tabs on day 5,6 with food, 1 tab on day 7,8 with food 20 tablet 0    sertraline (ZOLOFT) 25 mg tablet Take 1 tablet (25 mg total) by mouth daily 90 tablet 2    spironolactone (ALDACTONE) 25 mg tablet Take 1 tablet daily 90 tablet 0    Synthroid 137 MCG tablet Take 1 tablet (137 mcg total) by mouth daily 90 tablet 3    tretinoin (RETIN-A) 0 025 % cream Apply topically daily at bedtime Spread one pea-sized amount of medication over entire face about one hour before bedtime   45 g 0    metroNIDAZOLE (METROCREAM) 0 75 % cream Apply topically 2 (two) times a day Apply twice a day to face for redness 45 g 3     Current Facility-Administered Medications on File Prior to Visit   Medication Dose Route Frequency Provider Last Rate Last Admin    lidocaine (XYLOCAINE) 1 % injection 2 mL  2 mL Injection  Sharlene Millard DPM   2 mL at 05/25/22 1343    triamcinolone acetonide (KENALOG-40) 40 mg/mL injection 20 mg  20 mg Intra-articular  Rina Emerita, DPM   20 mg at 05/25/22 1343       Allergies  No Known Allergies    Review of Systems    Objective  Vitals:    09/26/22 0816   BP: 128/84   Pulse: 68   Temp: 97 5 °F (36 4 °C)       Physical Exam

## 2022-09-26 NOTE — H&P (VIEW-ONLY)
Assessment/Plan:    Lipoma of torso  Lipoma of left back  Plan for excision in the operating room with local and sedation  Will schedule at earliest mutual convenience          Subjective:      Patient ID: Rennie Boxer is a 42 y o  female  HPI  Patient presents with mass on her left back  She states it has been there for approximately one year and has grown slightly over that time  She denies any pain at the site  She states she now desires it to be removed  She had an ultrasound done of the mass which suggested that it was a lipoma  The following portions of the patient's history were reviewed and updated as appropriate: allergies, current medications, past family history, past medical history, past social history, past surgical history and problem list     Review of Systems   Constitutional: Negative  HENT: Negative  Eyes: Negative  Respiratory: Negative  Cardiovascular: Negative  Gastrointestinal: Negative  Endocrine: Negative  Genitourinary: Negative  Musculoskeletal: Negative  Skin: Negative  Mass of left upper back   Neurological: Negative  Psychiatric/Behavioral: Negative  Objective:      /84   Pulse 68   Temp 97 5 °F (36 4 °C) (Temporal)   Ht 5' 9" (1 753 m)   Wt 92 3 kg (203 lb 6 4 oz)   BMI 30 04 kg/m²          Physical Exam  Constitutional:       Appearance: Normal appearance  HENT:      Head: Normocephalic and atraumatic  Right Ear: External ear normal       Left Ear: External ear normal       Nose: Nose normal       Mouth/Throat:      Mouth: Mucous membranes are moist       Pharynx: Oropharynx is clear  Eyes:      Extraocular Movements: Extraocular movements intact  Conjunctiva/sclera: Conjunctivae normal       Pupils: Pupils are equal, round, and reactive to light  Cardiovascular:      Rate and Rhythm: Normal rate and regular rhythm  Pulses: Normal pulses     Pulmonary:      Effort: Pulmonary effort is normal  Abdominal:      General: Abdomen is flat  Palpations: Abdomen is soft  Tenderness: There is no abdominal tenderness  Musculoskeletal:         General: Normal range of motion  Cervical back: Normal range of motion  Skin:     General: Skin is warm and dry  Comments: 3cm mass of left upper back, non tender   Neurological:      General: No focal deficit present  Mental Status: She is alert and oriented to person, place, and time     Psychiatric:         Mood and Affect: Mood normal          Behavior: Behavior normal

## 2022-10-08 ENCOUNTER — ANESTHESIA EVENT (OUTPATIENT)
Dept: PERIOP | Facility: HOSPITAL | Age: 37
End: 2022-10-08
Payer: COMMERCIAL

## 2022-10-10 ENCOUNTER — ANESTHESIA (OUTPATIENT)
Dept: PERIOP | Facility: HOSPITAL | Age: 37
End: 2022-10-10
Payer: COMMERCIAL

## 2022-10-10 ENCOUNTER — HOSPITAL ENCOUNTER (OUTPATIENT)
Facility: HOSPITAL | Age: 37
Setting detail: OUTPATIENT SURGERY
Discharge: HOME | End: 2022-10-10
Attending: SURGERY | Admitting: SURGERY
Payer: COMMERCIAL

## 2022-10-10 VITALS
DIASTOLIC BLOOD PRESSURE: 86 MMHG | BODY MASS INDEX: 30.07 KG/M2 | HEART RATE: 76 BPM | SYSTOLIC BLOOD PRESSURE: 137 MMHG | TEMPERATURE: 97 F | RESPIRATION RATE: 18 BRPM | WEIGHT: 203 LBS | OXYGEN SATURATION: 97 % | HEIGHT: 69 IN

## 2022-10-10 DIAGNOSIS — R22.32 MASS OF SKIN OF LEFT SHOULDER: ICD-10-CM

## 2022-10-10 DIAGNOSIS — D17.1 LIPOMA OF TORSO: Primary | ICD-10-CM

## 2022-10-10 PROCEDURE — 23071 EXC SHOULDER LES SC 3 CM/>: CPT | Performed by: SURGERY

## 2022-10-10 PROCEDURE — 88304 TISSUE EXAM BY PATHOLOGIST: CPT | Performed by: SPECIALIST

## 2022-10-10 RX ORDER — ONDANSETRON 2 MG/ML
INJECTION INTRAMUSCULAR; INTRAVENOUS AS NEEDED
Status: DISCONTINUED | OUTPATIENT
Start: 2022-10-10 | End: 2022-10-10

## 2022-10-10 RX ORDER — DEXAMETHASONE SODIUM PHOSPHATE 10 MG/ML
INJECTION, SOLUTION INTRAMUSCULAR; INTRAVENOUS AS NEEDED
Status: DISCONTINUED | OUTPATIENT
Start: 2022-10-10 | End: 2022-10-10

## 2022-10-10 RX ORDER — ONDANSETRON 2 MG/ML
4 INJECTION INTRAMUSCULAR; INTRAVENOUS ONCE AS NEEDED
Status: DISCONTINUED | OUTPATIENT
Start: 2022-10-10 | End: 2022-10-10 | Stop reason: HOSPADM

## 2022-10-10 RX ORDER — FENTANYL CITRATE 50 UG/ML
INJECTION, SOLUTION INTRAMUSCULAR; INTRAVENOUS AS NEEDED
Status: DISCONTINUED | OUTPATIENT
Start: 2022-10-10 | End: 2022-10-10

## 2022-10-10 RX ORDER — EPHEDRINE SULFATE 50 MG/ML
INJECTION INTRAVENOUS AS NEEDED
Status: DISCONTINUED | OUTPATIENT
Start: 2022-10-10 | End: 2022-10-10

## 2022-10-10 RX ORDER — HYDROMORPHONE HCL IN WATER/PF 6 MG/30 ML
0.2 PATIENT CONTROLLED ANALGESIA SYRINGE INTRAVENOUS
Status: DISCONTINUED | OUTPATIENT
Start: 2022-10-10 | End: 2022-10-10 | Stop reason: HOSPADM

## 2022-10-10 RX ORDER — MIDAZOLAM HYDROCHLORIDE 2 MG/2ML
INJECTION, SOLUTION INTRAMUSCULAR; INTRAVENOUS AS NEEDED
Status: DISCONTINUED | OUTPATIENT
Start: 2022-10-10 | End: 2022-10-10

## 2022-10-10 RX ORDER — FENTANYL CITRATE/PF 50 MCG/ML
25 SYRINGE (ML) INJECTION
Status: DISCONTINUED | OUTPATIENT
Start: 2022-10-10 | End: 2022-10-10 | Stop reason: HOSPADM

## 2022-10-10 RX ORDER — PROPOFOL 10 MG/ML
INJECTION, EMULSION INTRAVENOUS CONTINUOUS PRN
Status: DISCONTINUED | OUTPATIENT
Start: 2022-10-10 | End: 2022-10-10

## 2022-10-10 RX ORDER — BUPIVACAINE HYDROCHLORIDE 2.5 MG/ML
INJECTION, SOLUTION EPIDURAL; INFILTRATION; INTRACAUDAL AS NEEDED
Status: DISCONTINUED | OUTPATIENT
Start: 2022-10-10 | End: 2022-10-10 | Stop reason: HOSPADM

## 2022-10-10 RX ORDER — PROPOFOL 10 MG/ML
INJECTION, EMULSION INTRAVENOUS AS NEEDED
Status: DISCONTINUED | OUTPATIENT
Start: 2022-10-10 | End: 2022-10-10

## 2022-10-10 RX ORDER — OXYCODONE HYDROCHLORIDE 5 MG/1
5 TABLET ORAL EVERY 6 HOURS PRN
Qty: 5 TABLET | Refills: 0 | Status: SHIPPED | OUTPATIENT
Start: 2022-10-10

## 2022-10-10 RX ORDER — LIDOCAINE HYDROCHLORIDE 10 MG/ML
0.5 INJECTION, SOLUTION EPIDURAL; INFILTRATION; INTRACAUDAL; PERINEURAL ONCE AS NEEDED
Status: DISCONTINUED | OUTPATIENT
Start: 2022-10-10 | End: 2022-10-10 | Stop reason: HOSPADM

## 2022-10-10 RX ORDER — SODIUM CHLORIDE, SODIUM LACTATE, POTASSIUM CHLORIDE, CALCIUM CHLORIDE 600; 310; 30; 20 MG/100ML; MG/100ML; MG/100ML; MG/100ML
50 INJECTION, SOLUTION INTRAVENOUS CONTINUOUS
Status: DISCONTINUED | OUTPATIENT
Start: 2022-10-10 | End: 2022-10-10 | Stop reason: HOSPADM

## 2022-10-10 RX ADMIN — DEXAMETHASONE SODIUM PHOSPHATE 10 MG: 10 INJECTION, SOLUTION INTRAMUSCULAR; INTRAVENOUS at 12:06

## 2022-10-10 RX ADMIN — SODIUM CHLORIDE, SODIUM LACTATE, POTASSIUM CHLORIDE, AND CALCIUM CHLORIDE 50 ML/HR: .6; .31; .03; .02 INJECTION, SOLUTION INTRAVENOUS at 10:45

## 2022-10-10 RX ADMIN — FENTANYL CITRATE 50 MCG: 50 INJECTION INTRAMUSCULAR; INTRAVENOUS at 11:53

## 2022-10-10 RX ADMIN — ONDANSETRON 4 MG: 2 INJECTION INTRAMUSCULAR; INTRAVENOUS at 12:06

## 2022-10-10 RX ADMIN — PROPOFOL 100 MCG/KG/MIN: 10 INJECTION, EMULSION INTRAVENOUS at 11:53

## 2022-10-10 RX ADMIN — EPHEDRINE SULFATE 5 MG: 50 INJECTION INTRAVENOUS at 12:34

## 2022-10-10 RX ADMIN — EPHEDRINE SULFATE 5 MG: 50 INJECTION INTRAVENOUS at 12:06

## 2022-10-10 RX ADMIN — EPHEDRINE SULFATE 5 MG: 50 INJECTION INTRAVENOUS at 11:57

## 2022-10-10 RX ADMIN — PROPOFOL 50 MG: 10 INJECTION, EMULSION INTRAVENOUS at 11:53

## 2022-10-10 RX ADMIN — EPHEDRINE SULFATE 5 MG: 50 INJECTION INTRAVENOUS at 12:12

## 2022-10-10 RX ADMIN — FENTANYL CITRATE 25 MCG: 50 INJECTION INTRAMUSCULAR; INTRAVENOUS at 12:06

## 2022-10-10 RX ADMIN — FENTANYL CITRATE 25 MCG: 50 INJECTION INTRAMUSCULAR; INTRAVENOUS at 12:16

## 2022-10-10 RX ADMIN — MIDAZOLAM 2 MG: 1 INJECTION INTRAMUSCULAR; INTRAVENOUS at 11:40

## 2022-10-10 RX ADMIN — EPHEDRINE SULFATE 5 MG: 50 INJECTION INTRAVENOUS at 11:54

## 2022-10-10 RX ADMIN — EPHEDRINE SULFATE 5 MG: 50 INJECTION INTRAVENOUS at 11:53

## 2022-10-10 NOTE — INTERVAL H&P NOTE
H&P reviewed  After examining the patient I find no changes in the patients condition since the H&P had been written  42 YOF with left shoulder lipoma  Plan for excision today  No changes in health, hospitalizations or changes in relevant medications since last seen in office      Vitals:    10/10/22 1030   BP: 118/80   Pulse: 69   Resp: 18   Temp: (!) 97 1 °F (36 2 °C)   SpO2: 98%

## 2022-10-10 NOTE — OP NOTE
OPERATIVE REPORT  PATIENT NAME: Violetta Kirk    :  1985  MRN: [id number]  Pt Location: [facility] OR ROOM 07    SURGERY DATE: 10/10/2022    Surgeon(s) and Role:     * Slade Tang DO - Primary     * Moose Allen MD - Assisting    Preop Diagnosis:  Mass of skin of left shoulder [R22 32]    Post-Op Diagnosis Codes:     * Mass of skin of left shoulder [R22 32]    Procedure(s) (LRB):  EXCISION of LEFT SHOULDER MASS (Left)    Specimen(s):  ID Type Source Tests Collected by Time Destination   1 : Left shoulder lipoma  Tissue Soft Tissue, Lipoma TISSUE EXAM Slade Tang DO 10/10/2022 1222        Estimated Blood Loss:   Minimal    Drains:  * No LDAs found *    Anesthesia Type:   IV Sedation with Anesthesia    Operative Indications: Mass of skin of left shoulder [R22 32]    Operative Findings:  3 x 2 cm lipomatous mass of left posterior shoulder    Complications:   None    Procedure and Technique:  Patient was brought into the operating room and identity and procedure were confirmed  Patient was placed in the right lateral decubitus position on the operating room table and was given sedation by Anesthesia  Time-out was performed and all parties were in agreement  Local anesthetic was instilled in the dermis overlying the area of the mass as well as deeper into the muscular fascia  A transverse incision was made in the skin using a 15 blade scalpel overlying the area of the mass  Dissection was carried down through subcutaneous tissue using Bovie electrocautery until a smooth lipomatous mass was encountered  The mass was dissected circumferentially using a combination of blunt dissection with a hemostat and Bovie electrocautery  The pedicle to the mass was divided using Bovie electrocautery  The specimen was then passed off and measured to be approximately 3 x 2 cm and multilobulated  It grossly had the appearance of a lipoma    The wound was then copiously irrigated with normal saline and hemostasis was ensured  Deep tissues were reapproximated using a single interrupted 3-0 Vicryl suture  Deep dermal layer was closed using interrupted 3-0 Vicryl suture  Skin was closed using 4-0 Monocryl running subcuticular suture  The wound was then washed and dried and sterile Exofin was applied  The patient was then awakened in the operating room and returned to the PACU in stable condition having tolerated the procedure well  Dr Doyle Luz was present for the entire procedure      Patient Disposition:  PACU         SIGNATURE: Paramjit Green MD  DATE: October 10, 2022  TIME: 1:13 PM

## 2022-10-10 NOTE — ANESTHESIA PREPROCEDURE EVALUATION
Procedure:  EXCISION of LEFT SHOULDER MASS (Left Shoulder)  Lipoma of left back  Relevant Problems   ENDO   (+) Hypothyroidism      Recent labs personally reviewed:  Lab Results   Component Value Date    WBC 7 73 09/15/2021    HGB 14 8 09/15/2021     09/15/2021     Lab Results   Component Value Date    K 4 3 09/15/2021    BUN 16 09/15/2021    CREATININE 1 11 09/15/2021     No results found for: PTT   No results found for: INR    Lab Results   Component Value Date    HGBA1C 5 4 09/14/2022       Type and Screen:  A      Physical Exam    Airway    Mallampati score: II  TM Distance: >3 FB  Neck ROM: full     Dental   No notable dental hx     Cardiovascular  Cardiovascular exam normal    Pulmonary  Pulmonary exam normal     Other Findings        Anesthesia Plan  ASA Score- 2     Anesthesia Type- IV sedation with anesthesia with ASA Monitors  Additional Monitors:   Airway Plan:     Comment: Supplemental O2, etco2 monitoring    Plan Factors-Exercise tolerance (METS): >4 METS  Chart reviewed  Patient summary reviewed  Patient is not a current smoker  Patient not instructed to abstain from smoking on day of procedure  Patient did not smoke on day of surgery  Induction- intravenous  Postoperative Plan-     Informed Consent- Anesthetic plan and risks discussed with patient  I personally reviewed this patient with the CRNA  Discussed and agreed on the Anesthesia Plan with the CRNA  Anju Handley

## 2022-10-10 NOTE — DISCHARGE INSTRUCTIONS
Please follow-up as scheduled  Activity:  - No lifting greater than 20 pounds or strenuous physical activity or exercise for 2 weeks  - Walking and normal light activities are encouraged  - Normal daily activities including climbing steps are okay  - No driving until no longer using pain medications  Diet:    - You may resume your normal diet  Wound Care:  - May shower daily  No tub baths or swimming until cleared by your surgeon   - Wash incision gently with soap and water and pat dry  - Do not apply any creams or ointments unless instructed to do so by your surgeon   - [facility] may apply ice as needed (no longer than 20 minutes at a time) for the first 48 hours  - Bruising is not unusual and will go away with a little time  You may apply a warm, moist compress that may help the bruising resolve quicker  - You may remove the dressings the day after surgery (unless otherwise instructed)  Leave any skin tapes (steri-strips) on the incision(s) in place until they fall off on their own  Any new dressings are optional      Medications:    - You may resume all of your regular medications, including blood thinners and aspirin, after going home unless otherwise instructed  Please refer to your discharge medication list for further details  - Please take the pain medications as directed  - You are encouraged to use non-narcotic pain medications first and whenever possible  Reserve the use of narcotic pain medication for moderate to severe pain not controlled by non-narcotic medications   - No driving while taking narcotic pain medications  - You may become constipated, especially if taking pain medications  You may take any over the counter stool softeners or laxatives as needed  Examples: Milk of Magnesia, Colace, Senna      Additional Instructions:  - If you have any questions or concerns after discharge please call the office   - Call office or return to ER if fever greater than 101, chills, persistent nausea/vomiting, worsening/uncontrollable pain, and/or increasing redness or purulent/foul smelling drainage from incision(s)

## 2022-10-10 NOTE — ANESTHESIA POSTPROCEDURE EVALUATION
Post-Op Assessment Note    CV Status:  Stable  Pain Score: 0    Pain management: adequate     Mental Status:  Alert and awake   Hydration Status:  Euvolemic   PONV Controlled:  Controlled   Airway Patency:  Patent      Post Op Vitals Reviewed: Yes      Staff: Anesthesiologist, CRNA         No complications documented      BP   129/84   Temp  98 3   Pulse  73   Resp   16   SpO2   97

## 2022-10-20 PROCEDURE — 88304 TISSUE EXAM BY PATHOLOGIST: CPT | Performed by: SPECIALIST

## 2022-10-31 ENCOUNTER — OFFICE VISIT (OUTPATIENT)
Dept: SURGERY | Facility: CLINIC | Age: 37
End: 2022-10-31

## 2022-10-31 VITALS
DIASTOLIC BLOOD PRESSURE: 81 MMHG | BODY MASS INDEX: 29.77 KG/M2 | SYSTOLIC BLOOD PRESSURE: 117 MMHG | HEART RATE: 66 BPM | HEIGHT: 69 IN | TEMPERATURE: 97.6 F | WEIGHT: 201 LBS

## 2022-10-31 DIAGNOSIS — D17.1 LIPOMA OF TORSO: Primary | ICD-10-CM

## 2022-10-31 NOTE — PROGRESS NOTES
Assessment/Plan:  42year old female s/p excision of lipoma on 10/10  Doing well post-operatively  Incision well healed  Final pathology report:   A  Soft Tissue, "left shoulder lipoma":  - Mature fibroadipose tissue consistent with lipoma  - Portion of skeletal muscle  - Patient may continue normal activity with no restrictions  - May call or follow-up with general surgery on an as needed basis             Subjective:      Patient ID: Kimberley Jay is a 42 y o  female  HPI  Patient seen and examined s/p lipoma excision on 10/10  Doing well post-operatively  Denies significant post-operative pain  No fever or chills  No erythema or drainage of incision  Review of Systems   All other systems reviewed and are negative  Objective:      /81   Pulse 66   Temp 97 6 °F (36 4 °C) (Temporal)   Ht 5' 9" (1 753 m)   Wt 91 2 kg (201 lb)   BMI 29 68 kg/m²          Physical Exam  Constitutional:       General: She is not in acute distress  HENT:      Head: Normocephalic and atraumatic  Eyes:      General: No scleral icterus  Extraocular Movements: Extraocular movements intact  Conjunctiva/sclera: Conjunctivae normal    Cardiovascular:      Rate and Rhythm: Normal rate  Pulmonary:      Effort: Pulmonary effort is normal  No respiratory distress  Musculoskeletal:         General: No swelling  Normal range of motion  Cervical back: Normal range of motion and neck supple  No tenderness  Skin:     General: Skin is warm and dry  Comments: L posterior shoulder surgical incision appears well healed, no erythema or drainage   Neurological:      Mental Status: She is alert and oriented to person, place, and time  Patient seen and examined with chief resident, Dr Janie Jones

## 2022-10-31 NOTE — PROGRESS NOTES
Office Visit - General Surgery  Padmini Baxter MRN: [id number]  Encounter: [id number]    Assessment and Plan  Problem List Items Addressed This Visit    None         Chief Complaint:  Padmini Baxter is a 42 y o  female who presents for Post-Op Infection and Post-op (P/o )    Subjective      Past Medical History:   Diagnosis Date   • Abnormal Pap smear of cervix    • AMA (advanced maternal age) multigravida 35+    • Cystic fibrosis carrier    • Disease of thyroid gland     hashimotos thyroiditis, nodule, hypo   • Female infertility     3 years of infertilty treatments; this is a spontaneous pregnancy   • HPV (human papilloma virus) infection    • Migraine    • PVC (premature ventricular contraction)     daily had them intermittently   • Varicella     childhood   • Vitiligo        Past Surgical History:   Procedure Laterality Date   •  SECTION     •  SECTION  2019   • COLPOSCOPY     • DILATION AND CURETTAGE OF UTERUS      age 19 with TOP, and SAB in 2017   • OPERATIVE HYSTEROSCOPY      removal of septum   • IN  DELIVERY ONLY N/A 2019    Procedure: Jerry Armendariz () REPEAT;  Surgeon: Landon Dorado MD;  [facility];  Service: Obstetrics   • IN  DELIVERY ONLY N/A 10/27/2020    Procedure:  SECTION () REPEAT;  Surgeon: Buddy Higgins MD;  [facility];  Service: Obstetrics   • IN EXC SKIN Reema Si 3 1-4 CM TRUNK,ARM,LEG Left 10/10/2022    Procedure: EXCISION of LEFT SHOULDER MASS;  Surgeon:  Em Dong DO;  [facility];  Service: General   • IN LIGATION,FALLOPIAN TUBE W/ Bilateral 10/27/2020    Procedure: LIGATION/COAGULATION TUBAL;  Surgeon: Buddy Higgins MD;  [facility] ;  Service: Obstetrics   • REDUCTION MAMMAPLASTY     • TUBAL LIGATION         Family History   Problem Relation Age of Onset   • Hypertension Mother    • Multiple sclerosis Mother    • Hashimoto's thyroiditis Mother    • Factor V Leiden deficiency Mother    • Other Mother         ms   • Transient ischemic attack Father    • Thyroid disease Sister    • Factor V Leiden deficiency Sister    • Factor V Leiden deficiency Sister    • Celiac disease Sister    • Hashimoto's thyroiditis Brother    • Lupus Brother    • No Known Problems Son    • No Known Problems Daughter    • COPD Maternal Grandmother    • Deep vein thrombosis Maternal Grandfather    • Diabetes Paternal Grandmother         TYPE 2   • Cancer Paternal Grandfather         [email or web address]   • Cystic fibrosis Maternal Aunt        Social History     Tobacco Use   • Smoking status: Never Smoker   • Smokeless tobacco: Never Used   Vaping Use   • Vaping Use: Never used   Substance Use Topics   • Alcohol use: Yes     Alcohol/week: 2 0 standard drinks     Types: 2 Cans of beer per week   • Drug use: Never        Medications  Current Outpatient Medications on File Prior to Visit   Medication Sig Dispense Refill   • cholecalciferol (VITAMIN D3) 25 mcg (1,000 units) tablet Take 4,000 units daily  • cyclobenzaprine (FLEXERIL) 10 mg tablet Take 1 tablet (10 mg total) by mouth 3 (three) times a day as needed for muscle spasms 20 tablet 0   • oxyCODONE (Roxicodone) 5 immediate release tablet Take 1 tablet (5 mg total) by mouth every 6 (six) hours as needed for severe pain Max Daily Amount: 20 mg 5 tablet 0   • sertraline (ZOLOFT) 25 mg tablet Take 1 tablet (25 mg total) by mouth daily 90 tablet 2   • spironolactone (ALDACTONE) 25 mg tablet Take 1 tablet daily 90 tablet 0   • Synthroid 137 MCG tablet Take 1 tablet (137 mcg total) by mouth daily (Patient taking differently: Take 630 mcg by mouth daily) 90 tablet 3   • tretinoin (RETIN-A) 0 025 % cream Apply topically daily at bedtime Spread one pea-sized amount of medication over entire face about one hour before bedtime   45 g 0   • metroNIDAZOLE (METROCREAM) 0 75 % cream Apply topically 2 (two) times a day Apply twice a day to face for redness 45 g 3     Current Facility-Administered Medications on File Prior to Visit   Medication Dose Route Frequency Provider Last Rate Last Admin   • lidocaine (XYLOCAINE) 1 % injection 2 mL  2 mL Injection  Stinson Kinnier, DPM   2 mL at 05/25/22 1343   • triamcinolone acetonide (KENALOG-40) 40 mg/mL injection 20 mg  20 mg Intra-articular  Stinson Kinnier, DPM   20 mg at 05/25/22 1343       Allergies  No Known Allergies    Review of Systems    Objective  Vitals:    10/31/22 1017   BP: 117/81   Pulse: 66   Temp: 97 6 °F (36 4 °C)       Physical Exam

## 2022-11-01 ENCOUNTER — OFFICE VISIT (OUTPATIENT)
Dept: FAMILY MEDICINE CLINIC | Facility: CLINIC | Age: 37
End: 2022-11-01

## 2022-11-01 VITALS
RESPIRATION RATE: 16 BRPM | WEIGHT: 209.3 LBS | BODY MASS INDEX: 31 KG/M2 | DIASTOLIC BLOOD PRESSURE: 76 MMHG | HEIGHT: 69 IN | HEART RATE: 70 BPM | SYSTOLIC BLOOD PRESSURE: 120 MMHG

## 2022-11-01 DIAGNOSIS — Z12.31 ENCOUNTER FOR SCREENING MAMMOGRAM FOR MALIGNANT NEOPLASM OF BREAST: ICD-10-CM

## 2022-11-01 DIAGNOSIS — F41.1 GENERALIZED ANXIETY DISORDER: ICD-10-CM

## 2022-11-01 DIAGNOSIS — Z00.00 ANNUAL PHYSICAL EXAM: Primary | ICD-10-CM

## 2022-11-01 DIAGNOSIS — E03.9 HYPOTHYROIDISM, UNSPECIFIED TYPE: ICD-10-CM

## 2022-11-01 RX ORDER — SERTRALINE HYDROCHLORIDE 25 MG/1
25 TABLET, FILM COATED ORAL DAILY
Qty: 90 TABLET | Refills: 3 | Status: SHIPPED | OUTPATIENT
Start: 2022-11-01

## 2022-11-01 RX ORDER — LEVOTHYROXINE SODIUM 137 MCG
137 TABLET ORAL DAILY
Qty: 90 TABLET | Refills: 3 | Status: SHIPPED | OUTPATIENT
Start: 2022-11-01

## 2022-11-01 RX ORDER — LEVOTHYROXINE SODIUM 137 MCG
137 TABLET ORAL DAILY
Qty: 90 TABLET | Refills: 3 | Status: SHIPPED | OUTPATIENT
Start: 2022-11-01 | End: 2022-11-01 | Stop reason: SDUPTHER

## 2022-11-01 RX ORDER — SERTRALINE HYDROCHLORIDE 25 MG/1
25 TABLET, FILM COATED ORAL DAILY
Qty: 90 TABLET | Refills: 3 | Status: SHIPPED | OUTPATIENT
Start: 2022-11-01 | End: 2022-11-01 | Stop reason: SDUPTHER

## 2022-11-01 NOTE — PATIENT INSTRUCTIONS

## 2022-11-01 NOTE — PROGRESS NOTES
ADULT ANNUAL PHYSICAL  [facility]    NAME: Nicky Houser  AGE: 42 y o  SEX: female  : 1985     DATE: 2022     Assessment and Plan:     Healthy 42year old female    Immunizations and preventive care screenings were discussed with patient today  Appropriate education was printed on patient's after visit summary  Counseling:  Alcohol/drug use: discussed moderation in alcohol intake, the recommendations for healthy alcohol use, and avoidance of illicit drug use  Dental Health: discussed importance of regular tooth brushing, flossing, and dental visits  Injury prevention: discussed safety/seat belts, safety helmets, smoke detectors, carbon dioxide detectors, and smoking near bedding or upholstery  Sexual health: discussed sexually transmitted diseases, partner selection, use of condoms, avoidance of unintended pregnancy, and contraceptive alternatives  · Exercise: the importance of regular exercise/physical activity was discussed  Recommend exercise 3-5 times per week for at least 30 minutes  Return in 1 year (on 2023)  Chief Complaint:     Chief Complaint   Patient presents with   • Physical Exam      History of Present Illness:     Adult Annual Physical   Patient here for a comprehensive physical exam  The patient reports no problems  Diet and Physical Activity  · Diet/Nutrition: well balanced diet  · Exercise: no formal exercise  Depression Screening  PHQ-2/9 Depression Screening         General Health  · Sleep: sleeps well  · Hearing: normal - bilateral   · Vision: goes for regular eye exams and most recent eye exam <1 year ago  · Dental: regular dental visits and brushes teeth twice daily  /GYN Health  · Last menstrual period: regular  · Pap due   · Mammo due next year 2023     Review of Systems:     Review of Systems   Constitutional: Negative  HENT: Negative      Eyes: Negative  Respiratory: Negative  Cardiovascular: Negative  Gastrointestinal: Negative  Endocrine: Negative  Genitourinary: Negative  Musculoskeletal: Negative  Skin: Negative  Allergic/Immunologic: Negative  Neurological: Negative  Hematological: Negative  Psychiatric/Behavioral: Negative  Past Medical History:     Past Medical History:   Diagnosis Date   • Abnormal Pap smear of cervix    • AMA (advanced maternal age) multigravida 35+    • Cystic fibrosis carrier    • Disease of thyroid gland     hashimotos thyroiditis, nodule, hypo   • Female infertility     3 years of infertilty treatments; this is a spontaneous pregnancy   • HPV (human papilloma virus) infection    • Migraine    • PVC (premature ventricular contraction)     daily had them intermittently   • Varicella     childhood   • Vitiligo       Past Surgical History:     Past Surgical History:   Procedure Laterality Date   •  SECTION     •  SECTION  2019   • COLPOSCOPY     • DILATION AND CURETTAGE OF UTERUS      age 19 with TOP, and SAB in 2017   • OPERATIVE HYSTEROSCOPY      removal of septum   • MT  DELIVERY ONLY N/A 2019    Procedure:  SECTION () REPEAT;  Surgeon: Nabeel Heck MD;  [facility];  Service: Obstetrics   • MT  DELIVERY ONLY N/A 10/27/2020    Procedure: Zohreh Kramer () REPEAT;  Surgeon: Tayla Ash MD;  [facility];  Service: Obstetrics   • MT EXC SKIN Talisha Adrián 3 1-4 CM TRUNK,ARM,LEG Left 10/10/2022    Procedure: EXCISION of LEFT SHOULDER MASS;  Surgeon:  Bill Benites DO;  [facility];  Service: General   • MT LIGATION,FALLOPIAN TUBE W/ Bilateral 10/27/2020    Procedure: LIGATION/COAGULATION TUBAL;  Surgeon: Tayla Ash MD;  [facility];  Service: Obstetrics   • REDUCTION MAMMAPLASTY     • TUBAL LIGATION        Social History:     Social History     Socioeconomic History   • Marital status: /Civil Union     Spouse name: None   • Number of children: None   • Years of education: None   • Highest education level: None   Occupational History   • None   Tobacco Use   • Smoking status: Never Smoker   • Smokeless tobacco: Never Used   Vaping Use   • Vaping Use: Never used   Substance and Sexual Activity   • Alcohol use: Yes     Alcohol/week: 2 0 standard drinks     Types: 2 Cans of beer per week   • Drug use: Never   • Sexual activity: Yes     Partners: Male     Birth control/protection: None   Other Topics Concern   • None   Social History Narrative    CAFFEINE USE     Social Determinants of Health     Financial Resource Strain: Not on file   Food Insecurity: Not on file   Transportation Needs: Not on file   Physical Activity: Not on file   Stress: Not on file   Social Connections: Not on file   Intimate Partner Violence: Not on file   Housing Stability: Not on file      Family History:     Family History   Problem Relation Age of Onset   • Hypertension Mother    • Multiple sclerosis Mother    • Hashimoto's thyroiditis Mother    • Factor V Leiden deficiency Mother    • Other Mother         ms   • Transient ischemic attack Father    • Thyroid disease Sister    • Factor V Leiden deficiency Sister    • Factor V Leiden deficiency Sister    • Celiac disease Sister    • Hashimoto's thyroiditis Brother    • Lupus Brother    • No Known Problems Son    • No Known Problems Daughter    • COPD Maternal Grandmother    • Deep vein thrombosis Maternal Grandfather    • Diabetes Paternal Grandmother         TYPE 2   • Cancer Paternal Grandfather         [email or web address]   • Cystic fibrosis Maternal Aunt       Current Medications:     Current Outpatient Medications   Medication Sig Dispense Refill   • cholecalciferol (VITAMIN D3) 25 mcg (1,000 units) tablet Take 4,000 units daily       • cyclobenzaprine (FLEXERIL) 10 mg tablet Take 1 tablet (10 mg total) by mouth 3 (three) times a day as needed for muscle spasms 20 tablet 0   • sertraline (ZOLOFT) 25 mg tablet Take 1 tablet (25 mg total) by mouth daily 90 tablet 2   • spironolactone (ALDACTONE) 25 mg tablet Take 1 tablet daily 90 tablet 0   • Synthroid 137 MCG tablet Take 1 tablet (137 mcg total) by mouth daily 90 tablet 3   • tretinoin (RETIN-A) 0 025 % cream Apply topically daily at bedtime Spread one pea-sized amount of medication over entire face about one hour before bedtime  45 g 0   • metroNIDAZOLE (METROCREAM) 0 75 % cream Apply topically 2 (two) times a day Apply twice a day to face for redness 45 g 3     Current Facility-Administered Medications   Medication Dose Route Frequency Provider Last Rate Last Admin   • lidocaine (XYLOCAINE) 1 % injection 2 mL  2 mL Injection  Edvin Farrier, DPM   2 mL at 05/25/22 1343   • triamcinolone acetonide (KENALOG-40) 40 mg/mL injection 20 mg  20 mg Intra-articular  Edvin Farrier, DPM   20 mg at 05/25/22 1343      Allergies:     No Known Allergies   Physical Exam:     /76   Pulse 70   Resp 16   Ht 5' 8 75" (1 746 m)   Wt 94 9 kg (209 lb 4 8 oz)   LMP  (LMP Unknown)   BMI 31 13 kg/m²     Physical Exam  Constitutional:       Appearance: Normal appearance  She is well-developed and normal weight  HENT:      Head: Normocephalic and atraumatic  Right Ear: Hearing normal       Left Ear: Hearing normal       Mouth/Throat:      Pharynx: Uvula midline  Eyes:      Extraocular Movements: Extraocular movements intact  Conjunctiva/sclera: Conjunctivae normal       Pupils: Pupils are equal, round, and reactive to light  Neck:      Thyroid: No thyromegaly  Cardiovascular:      Rate and Rhythm: Normal rate and regular rhythm  Pulses: Normal pulses  Heart sounds: Normal heart sounds  No murmur heard  Pulmonary:      Effort: Pulmonary effort is normal       Breath sounds: Normal breath sounds  Abdominal:      General: Abdomen is flat  Bowel sounds are normal  There is no distension        Palpations: Abdomen is soft  There is no mass  Tenderness: There is no abdominal tenderness  Musculoskeletal:         General: Normal range of motion  Cervical back: Normal range of motion and neck supple  Lymphadenopathy:      Cervical: No cervical adenopathy  Skin:     General: Skin is warm  Neurological:      General: No focal deficit present  Mental Status: She is alert and oriented to person, place, and time  Cranial Nerves: No cranial nerve deficit  Deep Tendon Reflexes: Reflexes normal    Psychiatric:         Mood and Affect: Mood normal          Behavior: Behavior normal          Thought Content: Thought content normal          Judgment: Judgment normal           Ajith Ball PA-C   [address]     BMI Counseling: Body mass index is 31 13 kg/m²  The BMI is above normal  Nutrition recommendations include reducing portion sizes

## 2022-12-21 ENCOUNTER — TELEPHONE (OUTPATIENT)
Dept: PLASTIC SURGERY | Facility: CLINIC | Age: 37
End: 2022-12-21

## 2023-04-20 NOTE — TELEPHONE ENCOUNTER
VN cued into room to complete admit assessment. VIP model introduced; VN working alongside bedside treatment team.  Plan of care reviewed with patient. Patient informed of fall risk, fall precautions, call light within reach, side rails x2 elevated. Patient notified to ask staff for assistance. Patient verbalized complete understanding. Time allowed for questions. Will continue to monitor and intervene as needed.    Called pt informed

## 2023-04-21 PROBLEM — Z98.891 S/P CESAREAN SECTION: Status: RESOLVED | Noted: 2020-10-27 | Resolved: 2023-04-21

## 2023-04-21 PROBLEM — L70.9 ACNE: Status: ACTIVE | Noted: 2023-04-21

## 2023-04-25 ENCOUNTER — TELEPHONE (OUTPATIENT)
Dept: OTHER | Facility: OTHER | Age: 38
End: 2023-04-25

## 2023-05-01 ENCOUNTER — TELEPHONE (OUTPATIENT)
Dept: PLASTIC SURGERY | Facility: CLINIC | Age: 38
End: 2023-05-01

## 2023-05-01 ENCOUNTER — EVALUATION (OUTPATIENT)
Dept: PHYSICAL THERAPY | Facility: CLINIC | Age: 38
End: 2023-05-01

## 2023-05-01 DIAGNOSIS — B37.9 CANDIDA INFECTION: Primary | ICD-10-CM

## 2023-05-01 DIAGNOSIS — M25.579 ANKLE PAIN, UNSPECIFIED CHRONICITY, UNSPECIFIED LATERALITY: Primary | ICD-10-CM

## 2023-05-01 RX ORDER — NYSTATIN 100000 U/G
CREAM TOPICAL 2 TIMES DAILY
Qty: 30 G | Refills: 0 | Status: SHIPPED | OUTPATIENT
Start: 2023-05-01 | End: 2023-07-13 | Stop reason: ALTCHOICE

## 2023-05-01 NOTE — PROGRESS NOTES
PT Evaluation     Today's date: 2023  Patient name: Elizabeth Vazquez  : 1985  MRN: [id number]  Referring provider: Conrad Lim*  Dx:   Encounter Diagnosis     ICD-10-CM    1  Ankle pain, unspecified chronicity, unspecified laterality  [id number] Ambulatory Referral to Physical Therapy                     Assessment  Assessment details: Elizabeth Vazquez is a pleasant 42 y o  female who presents with signs and symptoms correlating with referring diagnosis  No further referral appears necessary at this time based upon examination results  The patient's greatest concerns are decreasing soreness and improving confidence in her ability to keep up with her kids and not feel limited  She presents without any movement restrictions or strength limitations  The only limitation she had was end range DF/inverions PROM discomfort and tenderness to palpation of peroneal/soleus tendon  She was educated on returning to full functional activities and improving her confidence in her foot/ankle  Negative prognostic indicators:none  Positive prognostic indicators: great motivatin  Please contact me if you have any further questions or recommendations  Thank you very much for the kind referral       Impairments: activity intolerance    Symptom irritability: lowUnderstanding of Dx/Px/POC: excellent  Goals  STGs  1  Decrease pain by 100% in 2-4 weeks  2  Improve ankle PROM to full without pain in 2-4 weeks  3  Improve ankle strength by 1/3 grade in 2-4 weeks  Plan  Plan details: Improve confidence, focus on mobility and returning to full function  1-2 more visits depending on need     Patient would benefit from: skilled physical therapy  Referral necessary: No  Planned modality interventions: cryotherapy, TENS and thermotherapy: hydrocollator packs  Planned therapy interventions: manual therapy, therapeutic training, stretching, strengthening, therapeutic activities, therapeutic exercise, patient education, activity modification, neuromuscular re-education and home exercise program  Treatment plan discussed with: patient        Subjective Evaluation    History of Present Illness  Mechanism of injury: Pt is a 42 y o  female presenting w/ ankle pain starting new years blade  She was walking and slipped on a step outside and landed on her lateral R ankle in a inverted position  She reports that the pain was immediate and swelled up  She was able to weight bear so did not think she needed to get an x-ray  She is now able to ambulate and complete daily activities but occasionally gets soreness and discomfort in the lateral aspect  She is interested in returning to full function without thinking about limitations  Neurological signs: none   Red Flags: none   PMH: R foot pain h/o           Not a recurrent problem   Quality of life: good    Pain  Current pain ratin  At best pain ratin  At worst pain ratin  Quality: sore   Relieving factors: change in position  Aggravating factors: standing, walking and stair climbing  Progression: no change    Social Support  Stairs in house: yes     Employment status: not working  Patient Goals  Patient goals for therapy: increased strength, decreased pain, increased motion and independence with ADLs/IADLs          Objective     Active Range of Motion   Left Ankle/Foot   Normal active range of motion    Right Ankle/Foot   Normal active range of motion    Passive Range of Motion   Left Ankle/Foot  Normal passive range of motion    Right Ankle/Foot  Normal passive range of motion    Additional Passive Range of Motion Details  Discomfort at end range DF and inversion     Strength/Myotome Testing     Left Ankle/Foot   Normal strength    Right Ankle/Foot   Normal strength    Tests     Right Ankle/Foot   Negative for eversion talar tilt and syndesmosis external rotation       Additional Tests Details  Palpation: discomfort to peroneal tendon and soleus tendon  Balance WNL BLE no LOB on SLS  Functional squat WNL      Flowsheet Rows    Flowsheet Row Most Recent Value   PT/OT G-Codes    Current Score 91   Projected Score 88             Precautions: none      Manuals 5/1                                                                Neuro Re-Ed             Bosu ball SL squat             Foam rebounder             Balance beam              Side step balance              Side step w/ band around toes                                        Ther Ex             TM on incline              LP w/ HR              Ankle TB if needed             Heel walking              Toe walking                                                     Ther Activity                                       Gait Training                                       Modalities

## 2023-05-01 NOTE — TELEPHONE ENCOUNTER
Spoke to patient about criteria for breast reduction and emailed criteria and fax number so chiropractor notes can be sent  She will keep me posted on any physician and chiropractic documentation  Need 2 more months of physician documentation and 3 months of chiro

## 2023-05-03 ENCOUNTER — OFFICE VISIT (OUTPATIENT)
Dept: DERMATOLOGY | Facility: CLINIC | Age: 38
End: 2023-05-03

## 2023-05-03 VITALS — HEIGHT: 68 IN | WEIGHT: 214.4 LBS | BODY MASS INDEX: 32.49 KG/M2 | TEMPERATURE: 97.6 F

## 2023-05-03 DIAGNOSIS — L98.8 RHYTIDES: ICD-10-CM

## 2023-05-03 DIAGNOSIS — L70.0 ACNE VULGARIS: ICD-10-CM

## 2023-05-03 DIAGNOSIS — L71.9 ROSACEA: Primary | ICD-10-CM

## 2023-05-03 RX ORDER — SPIRONOLACTONE 25 MG/1
TABLET ORAL
Qty: 180 TABLET | Refills: 1 | Status: SHIPPED | OUTPATIENT
Start: 2023-05-03

## 2023-05-03 NOTE — PATIENT INSTRUCTIONS
"ROSACEA    Assessment and Plan:  History and physical most consistent with rosacea  Educated that rosacea is a chronic condition affecting the mid-face (nose, cheeks, chin, forehead, eyelids)  Educated that rosacea often presents as redness, telangiectasias, papules and pustules  Discussed that the underlying cause of rosacea is complex, including genetic, environmental, vascular and inflammatory factors  Discussed that there are various triggers to rosacea and that keeping a symptom journal can be helpful in identifying those triggers that make rosacea worse as some are patient-specific  Educated that UV exposure is a universal trigger and recommended use of daily SPF  Specifically recommended a physical blocker SPF to reduce the risk of flaring secondary to irritation from chemical blocker  Educated on the use of green tinted makeup to mask red coloration  Discussed treatment ladder including trigger avoidance, SPF use, topical treatment (metronidazole, ivermectin, azelaic acid, brimonidine gel, pimecrolimus), oral treatment options (short courses of oral antibiotics with sub-microbial dosing possible), laser treatment  Based on a thorough discussion of this condition and the management approach to it (including a comprehensive discussion of the known risks, side effects and potential benefits of treatment), the patient (family) agrees to implement the following specific plan:  Start use of SPF daily (look for sunscreens that list ONLY zinc oxide and titanium dioxide under \"active ingredients\")  Transition to gentle skin care products (ideally fragrance free)  Cerave, cetaphil, vanicream are good brands for this  Start journal to track any triggers and attempt to avoid all triggers  DRMTLGY/ [employer]- Brand for tinted sunscreen   Dr Rebecca Tobar favorite is called FLEX    *Discuss and educated patient on IPL (Intense pulse light)     *Discuss continuing Retin-A 0 025% cream if desired by patient  *Discuss " increasing Spironolactone   *Discuss and educated patient on Botox     *Discuss and educated patient on IPL (Intense pulse light)     *Discuss continuing Retin-A 0 025% cream if desired by patient  *Discuss increasing Spironolactone   *Discuss and educated patient on Botox

## 2023-05-03 NOTE — PROGRESS NOTES
"[facility] [facility] Note     Patient Name: Ly Rosario  Encounter Date: 05/03/23     Have you been cared for by a [facility] [address] [facility]atologist in the last 3 years and, if so, which description applies to you? Yes  I have been here within the last 3 years, and my medical history has NOT changed since that time  I am FEMALE/of child-bearing potential     REVIEW OF SYSTEMS:  Have you recently had or currently have any of the following? · No changes in my recent health  PAST MEDICAL HISTORY:  Have you personally ever had or currently have any of the following? If \"YES,\" then please provide more detail  · No changes in my medical history  FAMILY HISTORY:  Any \"first degree relatives\" (parent, brother, sister, or child) with the following?  No changes in my family's known health  PATIENT EXPERIENCE:     Do you want the Dermatologist to perform a COMPLETE skin exam today including a clinical examination under the \"bra and underwear\" areas? NO   If necessary, do we have your permission to call and leave a detailed message on your Preferred Phone number that includes your specific medical information? Yes      No Known Allergies   Current Outpatient Medications:     nystatin (MYCOSTATIN) cream, Apply topically 2 (two) times a day, Disp: 30 g, Rfl: 0    cholecalciferol (VITAMIN D3) 25 mcg (1,000 units) tablet, Take 4,000 units daily  , Disp: , Rfl:     cyclobenzaprine (FLEXERIL) 10 mg tablet, Take 1 tablet (10 mg total) by mouth 3 (three) times a day as needed for muscle spasms, Disp: 20 tablet, Rfl: 0    metroNIDAZOLE (METROCREAM) 0 75 % cream, Apply topically 2 (two) times a day Apply twice a day to face for redness (Patient not taking: Reported on 4/21/2023), Disp: 45 g, Rfl: 3    sertraline (ZOLOFT) 25 mg tablet, Take 1 tablet (25 mg total) by mouth daily (Patient not taking: Reported on 4/21/2023), Disp: 90 tablet, Rfl: 3    spironolactone (ALDACTONE) 25 mg tablet, Take 1 tablet daily, " Disp: 90 tablet, Rfl: 0    Synthroid 137 MCG tablet, Take 1 tablet (137 mcg total) by mouth daily, Disp: 90 tablet, Rfl: 3    tretinoin (RETIN-A) 0 025 % cream, Apply topically daily at bedtime Spread one pea-sized amount of medication over entire face about one hour before bedtime  , Disp: 45 g, Rfl: 0    Current Facility-Administered Medications:     lidocaine (XYLOCAINE) 1 % injection 2 mL, 2 mL, Injection, , Verner Rude, DPM, 2 mL at 05/25/22 1343    triamcinolone acetonide (KENALOG-40) 40 mg/mL injection 20 mg, 20 mg, Intra-articular, , Verner Rude, DPM, 20 mg at 05/25/22 1343           Whom besides the patient is providing clinical information about today's encounter?   o NO ADDITIONAL HISTORIAN (patient alone provided history)    Physical Exam and Assessment/Plan by Diagnosis:    Patient is here for rosacea  Patient always had rosacea but never knew it was rosacea  Patient saw Dr Brenda Waite and put her on metronidazole but did not take it because she was unsure of the medication  ROSACEA    Physical Exam:   Anatomic Location Affected:  Cheeks, chin, nose   Morphological Description:  Background centrofacial erythema with visible telangiectasias   Pertinent Positives:   Pertinent Negatives:     Additional History of Present Condition:    Duration: Throughout life  Symptoms: Redness  Triggers: Heat  Treatments attempted: None  Ocular symptoms? (Frequent styes, foreign body sensation in eyes, redness/irritation):  No    Assessment and Plan:   History and physical most consistent with rosacea   Educated that rosacea is a chronic condition affecting the mid-face (nose, cheeks, chin, forehead, eyelids)   Educated that rosacea often presents as redness, telangiectasias, papules and pustules   Discussed that the underlying cause of rosacea is complex, including genetic, environmental, vascular and inflammatory factors   Discussed that there are various triggers to rosacea and that keeping a symptom journal "can be helpful in identifying those triggers that make rosacea worse as some are patient-specific   Educated that UV exposure is a universal trigger and recommended use of daily SPF  Specifically recommended a physical blocker SPF to reduce the risk of flaring secondary to irritation from chemical blocker   Educated on the use of green tinted makeup to mask red coloration   Discussed treatment ladder including trigger avoidance, SPF use, topical treatment (metronidazole, ivermectin, azelaic acid, brimonidine gel, pimecrolimus), oral treatment options (short courses of oral antibiotics with sub-microbial dosing possible), laser treatment   Based on a thorough discussion of this condition and the management approach to it (including a comprehensive discussion of the known risks, side effects and potential benefits of treatment), the patient (family) agrees to implement the following specific plan:  o Start use of SPF daily (look for sunscreens that list ONLY zinc oxide and titanium dioxide under \"active ingredients\")  DRMTLGY (mineral tinted lotion) or [employer]- FLEX are good options for mineral sunscreens with some tinted coverage  o Transition to gentle skin care products (ideally fragrance free)  Cerave, cetaphil, vanicream are good brands for this   o Start journal to track any triggers and attempt to avoid all triggers  o Patient to consider IPL  If she desires to proceed with topical treatment she will contact us  ACNE    Physical Exam:   Anatomic Location Affected:  Chin   Morphological Description:  Clear today but previously with nodulocystic lesions per patient   Pertinent Positives:   Pertinent Negatives: Additional History of Present Condition:   - Started on spironolactone 25 mg daily with Dr Rosanne Stark for hormonal acne  - Previously well-controlled   Now with breakouts during menses  - Birth control method in place (surgical) with no plans for additional children  - No side effects " with spironolactone  Assessment and Plan:  - Patient has findings most consistent with hormonal acne  - Patient counseled on treatment options that more effectively target hormonal acne, including spironolactone and OCP, which can be used in combination     - Risks of spironolactone reviewed, including teratogenicity and the need for pregnancy prevention while using it    - Other risks, including hyperkalemia, menstrual irregularity, breast tenderness, hypotension, dizziness/lightheadedness/orthostatic symptoms, etc were reviewed  - The patient has no history of renal/liver disease or hyperkalemia and eats a balanced diet  - Unless over age 39 or with a history of renal/liver disease, hyperkalemia, or medication interactions, evidence-based medicine does not support routine lab studies in patients on spironolactone  - The potential link to estrogen-sensitive cancers in high dose animal studies such as breast cancer was discussed and the patient was counseled that the most recent meta analyses on spironolactone in humans on typical dosing has not demonstrated this risk  - After reviewing risks and benefits as noted above and detailed in provided handout, patient elected to proceed with increased dose of spironolactone  *Increase spironolactone from 25 mg daily to 25 mg BID  Patient to call if new onset side effects    RHYTIDES    Physical Exam:   Anatomic Location Affected:  Forehead, glabella, periocular   Morphological Description:  Rhytides with facial movements   Pertinent Positives:   Pertinent Negatives:     Additional History of Present Condition:   - Never had botox in past  Interested in this      Assessment and Plan:  Based on a thorough discussion of this condition and the management approach to it (including a comprehensive discussion of the known risks, side effects and potential benefits of treatment), the patient (family) agrees to implement the following specific plan:    *Discuss and educated patient on Botox  She would like to proceed with botox to the affected areas and understands this is to treat active wrinkles  Will schedule for cosmetic visit with me or Dr Lukas Varner         Time: 30 minutes spent directly with patient   10 minutes spent on documentation    Scribe Attestation    I,:  Mindy Tyler am acting as a scribe while in the presence of the attending physician :       I,:  Marilynn Escobedo MD personally performed the services described in this documentation    as scribed in my presence :

## 2023-05-10 ENCOUNTER — APPOINTMENT (OUTPATIENT)
Dept: PHYSICAL THERAPY | Facility: CLINIC | Age: 38
End: 2023-05-10
Payer: COMMERCIAL

## 2023-05-19 DIAGNOSIS — L80 VITILIGO: Primary | ICD-10-CM

## 2023-06-02 ENCOUNTER — OFFICE VISIT (OUTPATIENT)
Dept: FAMILY MEDICINE CLINIC | Facility: CLINIC | Age: 38
End: 2023-06-02

## 2023-06-02 VITALS
BODY MASS INDEX: 31.7 KG/M2 | HEART RATE: 87 BPM | OXYGEN SATURATION: 98 % | WEIGHT: 214 LBS | HEIGHT: 69 IN | RESPIRATION RATE: 16 BRPM | DIASTOLIC BLOOD PRESSURE: 80 MMHG | SYSTOLIC BLOOD PRESSURE: 114 MMHG

## 2023-06-02 DIAGNOSIS — M54.2 CHRONIC NECK PAIN: Primary | ICD-10-CM

## 2023-06-02 DIAGNOSIS — N64.89 BILATERAL PENDULOUS BREASTS: ICD-10-CM

## 2023-06-02 DIAGNOSIS — G89.29 CHRONIC NECK PAIN: Primary | ICD-10-CM

## 2023-06-02 DIAGNOSIS — M54.9 CHRONIC UPPER BACK PAIN: ICD-10-CM

## 2023-06-02 DIAGNOSIS — G89.29 CHRONIC UPPER BACK PAIN: ICD-10-CM

## 2023-06-02 NOTE — PROGRESS NOTES
Assessment/Plan:    1  Neck pain/ upper back pain- chronic neck pain with acute flare, currently improving, continue with heat, stretching, motrin as needed  Has seen chiropractor continues as needed  Would benefit from breast reduction    F/u as needed        Subjective:   Chief Complaint   Patient presents with   • Neck Pain     Over the last week      Patient ID: Gilbert Hood is a 42 y o  female  Patient here complaining neck pain starting last Friday  Felt like it needed to be cracked and it couldn't  Motrin, heat, stretching  Slowly improved over the week  Better range of motion today  Still with chronic neck and upper back pain, acute flare has improved  Notes only different was she was attemting to get back into exercise  Chronic neck and upper back pain due to pendulous breasts  Still waiting to hear from plastics about appointment to discuss reduction        The following portions of the patient's history were reviewed and updated as appropriate: allergies, current medications, past family history, past medical history, past social history, past surgical history and problem list     Past Medical History:   Diagnosis Date   • Abnormal Pap smear of cervix    • AMA (advanced maternal age) multigravida 35+    • Cystic fibrosis carrier    • Disease of thyroid gland     hashimotos thyroiditis, nodule, hypo   • Female infertility     3 years of infertilty treatments; this is a spontaneous pregnancy   • HPV (human papilloma virus) infection    • Migraine    • PVC (premature ventricular contraction)     daily had them intermittently   • Varicella     childhood   • Vitiligo      Past Surgical History:   Procedure Laterality Date   •  SECTION     •  SECTION  2019   • COLPOSCOPY     • DILATION AND CURETTAGE OF UTERUS      age 19 with TOP, and SAB in 2017   • OPERATIVE HYSTEROSCOPY      removal of septum   • LA  DELIVERY ONLY N/A 2019    Procedure:  SECTION () REPEAT;  Surgeon: Shayy Aviles MD;  [facility];  Service: Obstetrics   • DC  DELIVERY ONLY N/A 10/27/2020    Procedure: Tilman Freeze () REPEAT;  Surgeon: Tena Villaloobs MD;  [facility];  Service: Obstetrics   • DC EXC B9 LESION MRGN XCP SK TG T/A/L 3 1-4 0 CM Left 10/10/2022    Procedure: EXCISION of LEFT SHOULDER MASS;  Surgeon: Cuate Sampsno DO;  Location: [facility];  Service: General   • DC LIG/TRNSXJ FALOPIAN TUBE  DEL/ABDML SURG Bilateral 10/27/2020    Procedure: LIGATION/COAGULATION TUBAL;  Surgeon: Tena Villalobos MD;  [facility];  Service: Obstetrics   • REDUCTION MAMMAPLASTY     • TUBAL LIGATION       Family History   Problem Relation Age of Onset   • Hypertension Mother    • Multiple sclerosis Mother    • Hashimoto's thyroiditis Mother    • Factor V Leiden deficiency Mother    • Other Mother         ms   • Transient ischemic attack Father    • Thyroid disease Sister    • Factor V Leiden deficiency Sister    • Factor V Leiden deficiency Sister    • Celiac disease Sister    • Hashimoto's thyroiditis Brother    • Lupus Brother    • No Known Problems Son    • No Known Problems Daughter    • COPD Maternal Grandmother    • Deep vein thrombosis Maternal Grandfather    • Diabetes Paternal Grandmother         TYPE 2   • Cancer Paternal Grandfather         [email or web address]   • Cystic fibrosis Maternal Aunt      Social History     Socioeconomic History   • Marital status: /Civil Union     Spouse name: Not on file   • Number of children: Not on file   • Years of education: Not on file   • Highest education level: Not on file   Occupational History   • Not on file   Tobacco Use   • Smoking status: Never   • Smokeless tobacco: Never   Vaping Use   • Vaping Use: Never used   Substance and Sexual Activity   • Alcohol use:  Yes     Alcohol/week: 2 0 standard drinks of alcohol     Types: 2 Cans of beer per week   • Drug use: Never   • Sexual activity: Yes     Partners: Male     Birth control/protection: None   Other Topics Concern   • Not on file   Social History Narrative    CAFFEINE USE     Social Determinants of Health     Financial Resource Strain: Not on file   Food Insecurity: Not on file   Transportation Needs: Not on file   Physical Activity: Not on file   Stress: Not on file   Social Connections: Not on file   Intimate Partner Violence: Not on file   Housing Stability: Not on file       Current Outpatient Medications:   •  cholecalciferol (VITAMIN D3) 25 mcg (1,000 units) tablet, Take 4,000 units daily  , Disp: , Rfl:   •  nystatin (MYCOSTATIN) cream, Apply topically 2 (two) times a day, Disp: 30 g, Rfl: 0  •  spironolactone (ALDACTONE) 25 mg tablet, Take 1 tablet daily, Disp: 90 tablet, Rfl: 0  •  spironolactone (ALDACTONE) 25 mg tablet, Take 2 pills ( 50 mg total )daily  May split dose or take together  Take with large glass of water  Avoid large quantities of high potassium foods while on this medication  , Disp: 180 tablet, Rfl: 1  •  Synthroid 137 MCG tablet, Take 1 tablet (137 mcg total) by mouth daily, Disp: 90 tablet, Rfl: 3  •  tretinoin (RETIN-A) 0 025 % cream, Apply topically daily at bedtime Spread one pea-sized amount of medication over entire face about one hour before bedtime  , Disp: 45 g, Rfl: 0  •  cyclobenzaprine (FLEXERIL) 10 mg tablet, Take 1 tablet (10 mg total) by mouth 3 (three) times a day as needed for muscle spasms (Patient not taking: Reported on 6/2/2023), Disp: 20 tablet, Rfl: 0  •  metroNIDAZOLE (METROCREAM) 0 75 % cream, Apply topically 2 (two) times a day Apply twice a day to face for redness (Patient not taking: Reported on 4/21/2023), Disp: 45 g, Rfl: 3  •  Ruxolitinib Phosphate 1 5 % CREA, Apply thin layers to area with lost pigmentation twice daily  Do not exceed 10% of your total body surface area  Stop medication if you develop an infection at site of application or if you develop a systemic infection   Once "infection resolves, you may reinitiate application  (Patient not taking: Reported on 6/2/2023), Disp: 60 g, Rfl: 2  •  sertraline (ZOLOFT) 25 mg tablet, Take 1 tablet (25 mg total) by mouth daily (Patient not taking: Reported on 4/21/2023), Disp: 90 tablet, Rfl: 3    Current Facility-Administered Medications:   •  lidocaine (XYLOCAINE) 1 % injection 2 mL, 2 mL, Injection, , Erskine Valdez, DPM, 2 mL at 05/25/22 1343  •  triamcinolone acetonide (KENALOG-40) 40 mg/mL injection 20 mg, 20 mg, Intra-articular, , Erskine Valdez, DPM, 20 mg at 05/25/22 1343    Review of Systems          Objective:    Vitals:    06/02/23 0955   Pulse: 87   Resp: 16   SpO2: 98%   Weight: 97 1 kg (214 lb)   Height: 5' 8 75\" (1 746 m)        Physical Exam  Constitutional:       Appearance: Normal appearance  HENT:      Head: Normocephalic and atraumatic  Musculoskeletal:      Comments: c-spine not tender, full ROM, left cervical neck muscles and trapezius muscle with spasm L>R, full ROM but with discomfort   Skin:     General: Skin is warm  Neurological:      General: No focal deficit present  Mental Status: She is alert and oriented to person, place, and time  Psychiatric:         Mood and Affect: Mood normal          Behavior: Behavior normal          Thought Content:  Thought content normal          Judgment: Judgment normal                "

## 2023-06-13 ENCOUNTER — TELEPHONE (OUTPATIENT)
Dept: DERMATOLOGY | Facility: CLINIC | Age: 38
End: 2023-06-13

## 2023-06-13 NOTE — TELEPHONE ENCOUNTER
Patient called checking in on the status of her PA for Opzelura cream for her Vitiligo  Any status on this Camden Kitchen? I saw Dr Ignacia Hutchison had sent you a message for this

## 2023-06-20 NOTE — TELEPHONE ENCOUNTER
Patient called called asking for update on Bianca Berrios prior authorization, I did make her aware that her insurance did deny medication due to not being medically necessary and was waiting to hear from provider to see how she would like to move forward  I stated I would contact her once I heard back from provider  She confirmed understanding

## 2023-06-21 NOTE — TELEPHONE ENCOUNTER
Thanks, Jensen Tobar  Happy to try for a peer to peer but as she has not yet been on tacrolimus or phototherapy I am not entirely optimistic  (Let me know if you or I initiate the request for the peer to peer)  Could you let her know that we will try but that we will likely need to attempt other treatments before they will release the medication?

## 2023-06-26 ENCOUNTER — HOSPITAL ENCOUNTER (OUTPATIENT)
Dept: MAMMOGRAPHY | Facility: CLINIC | Age: 38
Discharge: HOME | End: 2023-06-26
Payer: COMMERCIAL

## 2023-06-26 VITALS — HEIGHT: 69 IN | BODY MASS INDEX: 30.66 KG/M2 | WEIGHT: 207 LBS

## 2023-06-26 DIAGNOSIS — Z12.31 ENCOUNTER FOR SCREENING MAMMOGRAM FOR MALIGNANT NEOPLASM OF BREAST: ICD-10-CM

## 2023-06-26 PROCEDURE — 77067 SCR MAMMO BI INCL CAD: CPT

## 2023-06-26 PROCEDURE — 77063 BREAST TOMOSYNTHESIS BI: CPT

## 2023-07-12 ENCOUNTER — TELEPHONE (OUTPATIENT)
Dept: MAMMOGRAPHY | Facility: CLINIC | Age: 38
End: 2023-07-12

## 2023-07-12 DIAGNOSIS — L80 VITILIGO: Primary | ICD-10-CM

## 2023-07-12 RX ORDER — TACROLIMUS 1 MG/G
OINTMENT TOPICAL
Qty: 100 G | Refills: 1 | Status: SHIPPED | OUTPATIENT
Start: 2023-07-12

## 2023-07-12 NOTE — TELEPHONE ENCOUNTER
We were unsuccessful in contacting the above patient for her diagnostic follow up ultrasound. I have left messages for her on 6/28, 7/5 and 7/12 with no response to schedule. Please attempt to contact this patient and have them schedule their appointment. Please let me know if you have any questions or if I can be of any further assistance.   Thank you,  Farida Aviles, RN, BSN  Breast Nurse Navigator

## 2023-07-13 ENCOUNTER — OFFICE VISIT (OUTPATIENT)
Age: 38
End: 2023-07-13
Payer: COMMERCIAL

## 2023-07-13 VITALS
DIASTOLIC BLOOD PRESSURE: 72 MMHG | WEIGHT: 209.4 LBS | SYSTOLIC BLOOD PRESSURE: 110 MMHG | HEIGHT: 69 IN | BODY MASS INDEX: 31.01 KG/M2

## 2023-07-13 DIAGNOSIS — Z12.31 ENCOUNTER FOR SCREENING MAMMOGRAM FOR MALIGNANT NEOPLASM OF BREAST: ICD-10-CM

## 2023-07-13 DIAGNOSIS — Z01.419 ENCNTR FOR GYN EXAM (GENERAL) (ROUTINE) W/O ABN FINDINGS: Primary | ICD-10-CM

## 2023-07-13 DIAGNOSIS — N94.3 PMS (PREMENSTRUAL SYNDROME): ICD-10-CM

## 2023-07-13 PROCEDURE — G0476 HPV COMBO ASSAY CA SCREEN: HCPCS | Performed by: OBSTETRICS & GYNECOLOGY

## 2023-07-13 PROCEDURE — G0145 SCR C/V CYTO,THINLAYER,RESCR: HCPCS | Performed by: OBSTETRICS & GYNECOLOGY

## 2023-07-13 PROCEDURE — S0612 ANNUAL GYNECOLOGICAL EXAMINA: HCPCS | Performed by: OBSTETRICS & GYNECOLOGY

## 2023-07-13 RX ORDER — FLUOXETINE 10 MG/1
10 CAPSULE ORAL DAILY
Qty: 30 CAPSULE | Refills: 3 | Status: SHIPPED | OUTPATIENT
Start: 2023-07-13

## 2023-07-13 NOTE — PROGRESS NOTES
Jonel Enoc  1985      CC:  Yearly exam    S:  42 y.o. female here for yearly exam. Her cycles are regular, not heavy or crampy.     + moodiness, two weeks prior to period. She denies vaginal discharge, itching, pelvic pain. She has no urinary concerns, does have some stress incontinence (trampoline). No bowel concerns. No breast concerns. Sexual activity: She is sexually active without pain, bleeding or dryness. She is  and monogamous. She is not interested in STD screening today. Contraception: She uses tubal for contraception. Last Pap: 10/3/18 - NILM, Neg HPV  Last Mammo:  6/26/23, needs follow up right breast US - this is scheduled    We reviewed ASCCP guidelines for Pap testing today. Family hx of breast cancer: no  Family hx of ovarian cancer: no  Family hx of colon cancer: no      Current Outpatient Medications:   •  cholecalciferol (VITAMIN D3) 25 mcg (1,000 units) tablet, Take 4,000 units daily. , Disp: , Rfl:   •  spironolactone (ALDACTONE) 25 mg tablet, Take 1 tablet daily, Disp: 90 tablet, Rfl: 0  •  Synthroid 137 MCG tablet, Take 1 tablet (137 mcg total) by mouth daily, Disp: 90 tablet, Rfl: 3  •  tacrolimus (PROTOPIC) 0.1 % ointment, Apply twice daily on Saturdays and Sundays to areas of lost pigmentation on body. , Disp: 100 g, Rfl: 1  •  tretinoin (RETIN-A) 0.025 % cream, Apply topically daily at bedtime Spread one pea-sized amount of medication over entire face about one hour before bedtime. , Disp: 45 g, Rfl: 0  •  sertraline (ZOLOFT) 25 mg tablet, Take 1 tablet (25 mg total) by mouth daily (Patient not taking: Reported on 4/21/2023), Disp: 90 tablet, Rfl: 3    Current Facility-Administered Medications:   •  lidocaine (XYLOCAINE) 1 % injection 2 mL, 2 mL, Injection, , Ingrid Avendano DPM, 2 mL at 05/25/22 1343  •  triamcinolone acetonide (KENALOG-40) 40 mg/mL injection 20 mg, 20 mg, Intra-articular, , Ingrid Avendano DPM, 20 mg at 05/25/22 1343  Patient Active Problem List   Diagnosis   • Multiple thyroid nodules   • Vitamin D deficiency   • Hypothyroidism   • Cystic fibrosis carrier   • Status post bilateral salpingectomy   • Lipoma of torso   • Acne     Past Medical History:   Diagnosis Date   • Abnormal Pap smear of cervix 2007   • AMA (advanced maternal age) multigravida 33+    • Cystic fibrosis carrier    • Disease of thyroid gland     hashimotos thyroiditis, nodule, hypo   • Female infertility     3 years of infertilty treatments; this is a spontaneous pregnancy   • HPV (human papilloma virus) infection    • Migraine    • PVC (premature ventricular contraction)     daily had them intermittently   • Varicella     childhood   • Vitiligo      Family History   Problem Relation Age of Onset   • Hypertension Mother    • Multiple sclerosis Mother    • Hashimoto's thyroiditis Mother    • Factor V Leiden deficiency Mother    • Other Mother         ms   • Transient ischemic attack Father    • Thyroid disease Sister    • Factor V Leiden deficiency Sister    • Factor V Leiden deficiency Sister    • Celiac disease Sister    • No Known Problems Daughter    • No Known Problems Daughter    • COPD Maternal Grandmother    • Deep vein thrombosis Maternal Grandfather    • Diabetes Paternal Grandmother         TYPE 2   • Cancer Paternal Grandfather         [email or web address]   • Hashimoto's thyroiditis Brother    • Lupus Brother    • No Known Problems Son    • Cystic fibrosis Maternal Aunt 8   • No Known Problems Maternal Uncle    • No Known Problems Paternal Aunt    • No Known Problems Paternal Aunt    • No Known Problems Paternal Aunt    • No Known Problems Paternal Uncle    • Breast cancer Neg Hx    • Colon cancer Neg Hx           Review of Systems   Respiratory: Negative. Cardiovascular: Negative. Gastrointestinal: Negative for constipation and diarrhea.      O:  Blood pressure 110/72, height 5' 8.5" (1.74 m), weight 95 kg (209 lb 6.4 oz), last menstrual period 07/05/2023, not currently breastfeeding. Patient appears well and is not in distress  Breasts are symmetrical without mass, tenderness, nipple discharge, skin changes or adenopathy. Abdomen is soft and nontender without masses. External genitals are normal without lesions or rashes. Urethral meatus and urethra are normal  Bladder is normal to palpation  Vagina is normal without discharge or bleeding. Cervix is normal without discharge or lesion. Uterus is normal, mobile, nontender without palpable mass. Adnexa are normal, nontender, without palpable mass. A:  Yearly exam.     P:   Pap & HPV up to date   Mammo follow up ordered   Discussed PMS options - could consider OCP, cyclic prozac. She is most interested in trial of Prozac - sent to pharmacy. RTO one year for yearly exam or sooner as needed.

## 2023-07-14 LAB
HPV HR 12 DNA CVX QL NAA+PROBE: NEGATIVE
HPV16 DNA CVX QL NAA+PROBE: NEGATIVE
HPV18 DNA CVX QL NAA+PROBE: NEGATIVE

## 2023-07-20 LAB
LAB AP GYN PRIMARY INTERPRETATION: NORMAL
Lab: NORMAL

## 2023-07-27 ENCOUNTER — COSMETIC (OUTPATIENT)
Dept: PLASTIC SURGERY | Facility: CLINIC | Age: 38
End: 2023-07-27

## 2023-07-27 ENCOUNTER — CONSULT (OUTPATIENT)
Dept: PLASTIC SURGERY | Facility: CLINIC | Age: 38
End: 2023-07-27
Payer: COMMERCIAL

## 2023-07-27 VITALS
WEIGHT: 209 LBS | BODY MASS INDEX: 30.96 KG/M2 | HEIGHT: 69 IN | SYSTOLIC BLOOD PRESSURE: 126 MMHG | DIASTOLIC BLOOD PRESSURE: 86 MMHG

## 2023-07-27 DIAGNOSIS — N64.89 PENDULOUS BREAST: ICD-10-CM

## 2023-07-27 DIAGNOSIS — Z41.1 ENCOUNTER FOR COSMETIC SURGERY: Primary | ICD-10-CM

## 2023-07-27 PROCEDURE — RECHECK: Performed by: STUDENT IN AN ORGANIZED HEALTH CARE EDUCATION/TRAINING PROGRAM

## 2023-07-27 PROCEDURE — CP99022 PRE-OP COSMETIC EVALUATION: Performed by: STUDENT IN AN ORGANIZED HEALTH CARE EDUCATION/TRAINING PROGRAM

## 2023-07-27 PROCEDURE — 99214 OFFICE O/P EST MOD 30 MIN: CPT | Performed by: STUDENT IN AN ORGANIZED HEALTH CARE EDUCATION/TRAINING PROGRAM

## 2023-07-27 NOTE — PROGRESS NOTES
Assessment and Plan:  Ms. Edin Marroquin is a 34 y.o. female presenting with symptomatic macromastia and unacceptable contour of abdomen    We discussed the procedure, risks, benefits, alternatives and postoperative instructions and expectations. I do think the patient would benefit for a BBR as her breasts are dense, heavy and ptotic and large for her body habitus. We did speak regarding vascular supple to the NAC and complications associated with revision breast surgery but given the length of time since her prior operation, I am hopeful for full revascularization and ability to utilize the inferior pedicle despite no knowledge of pedicle utilized in the initial BBR. I also think her abdomen would be reasonable to address simultaneously. I would like her to be within 10-15 lbs of her goal weight. All patient's questions were answered and she voiced understanding. Booking form submitted. History of Present Illness:   Ms. Edin Marroquin is a 34 y.o. female presenting with symptomatic macromastia and unacceptable cosmetic appearance of abdomen. Underwent BBR at age of 23 but recurrence of macromastia that progressively worsened after birth of 3 children. Patient also discontent with abdominal contour despite increasing activity and improving diet. She is currently 20-25 lbs from her goal/sustainable weight. Doing beach body workouts. Will be more mindful of sodium intake. Did have mammogram recently and scheduled for ultrasound Monday to better assess irregularity observed on screening. Denies family history of breast CA. Denies nicotine use. Currently a 36G bra. Goal is to be proportional in contour. Reports pain, bra strap grooving, intertrigo/maceration along IMF. Review of Systems:  A 12 point ROS was performed and negative except per HPI.     Past Medical History:  Past Medical History:   Diagnosis Date   • Abnormal Pap smear of cervix 2007   • AMA (advanced maternal age) multigravida 35+    • Cystic fibrosis carrier    • Disease of thyroid gland     hashimotos thyroiditis, nodule, hypo   • Female infertility     3 years of infertilty treatments; this is a spontaneous pregnancy   • HPV (human papilloma virus) infection    • Migraine    • PVC (premature ventricular contraction)     daily had them intermittently   • Varicella     childhood   • Vitiligo        Past Surgical History:  Past Surgical History:   Procedure Laterality Date   •  SECTION     •  SECTION  2019   • COLPOSCOPY     • DILATION AND CURETTAGE OF UTERUS      age 19 with TOP, and SAB in 2017   • OPERATIVE HYSTEROSCOPY      removal of septum   • MD  DELIVERY ONLY N/A 2019    Procedure:  SECTION () REPEAT;  Surgeon: Grecia Gee MD;  [facility];  Service: Obstetrics   • MD  DELIVERY ONLY N/A 10/27/2020    Procedure:  SECTION () REPEAT;  Surgeon: Marlyn Camara MD;  [facility];  Service: Obstetrics   • MD EXC B9 LESION MRGN XCP SK TG T/A/L 3.1-4.0 CM Left 10/10/2022    Procedure: EXCISION of LEFT SHOULDER MASS;  Surgeon: Cori Cox DO;  [facility];  Service: General   • MD LIG/TRNSXJ FALOPIAN TUBE  DEL/ABDML SURG Bilateral 10/27/2020    Procedure: LIGATION/COAGULATION TUBAL;  Surgeon: Marlyn Camara MD;  [facility];  Service: Obstetrics   • REDUCTION MAMMAPLASTY Bilateral     ? • TUBAL LIGATION         Social History:  Social History     Tobacco Use   • Smoking status: Never   • Smokeless tobacco: Never   Vaping Use   • Vaping Use: Never used   Substance Use Topics   • Alcohol use:  Yes     Alcohol/week: 2.0 standard drinks of alcohol     Types: 2 Cans of beer per week   • Drug use: Never       Family History:  Family History   Problem Relation Age of Onset   • Hypertension Mother    • Multiple sclerosis Mother    • Hashimoto's thyroiditis Mother    • Factor V Leiden deficiency Mother    • Other Mother         ms   • Transient ischemic attack Father    • Thyroid disease Sister    • Factor V Leiden deficiency Sister    • Factor V Leiden deficiency Sister    • Celiac disease Sister    • No Known Problems Daughter    • No Known Problems Daughter    • COPD Maternal Grandmother    • Deep vein thrombosis Maternal Grandfather    • Diabetes Paternal Grandmother         TYPE 2   • Cancer Paternal Grandfather         [email or web address]   • Hashimoto's thyroiditis Brother    • Lupus Brother    • No Known Problems Son    • Cystic fibrosis Maternal Aunt 8   • No Known Problems Maternal Uncle    • No Known Problems Paternal Aunt    • No Known Problems Paternal Aunt    • No Known Problems Paternal Aunt    • No Known Problems Paternal Uncle    • Breast cancer Neg Hx    • Colon cancer Neg Hx        Allergies:  No Known Allergies    Medications:  Current Outpatient Medications on File Prior to Visit   Medication Sig Dispense Refill   • cholecalciferol (VITAMIN D3) 25 mcg (1,000 units) tablet Take 4,000 units daily. • FLUoxetine (PROzac) 10 mg capsule Take 1 capsule (10 mg total) by mouth daily 30 capsule 3   • sertraline (ZOLOFT) 25 mg tablet Take 1 tablet (25 mg total) by mouth daily (Patient not taking: Reported on 4/21/2023) 90 tablet 3   • spironolactone (ALDACTONE) 25 mg tablet Take 1 tablet daily 90 tablet 0   • Synthroid 137 MCG tablet Take 1 tablet (137 mcg total) by mouth daily 90 tablet 3   • tacrolimus (PROTOPIC) 0.1 % ointment Apply twice daily on Saturdays and Sundays to areas of lost pigmentation on body. 100 g 1   • tretinoin (RETIN-A) 0.025 % cream Apply topically daily at bedtime Spread one pea-sized amount of medication over entire face about one hour before bedtime.  45 g 0     Current Facility-Administered Medications on File Prior to Visit   Medication Dose Route Frequency Provider Last Rate Last Admin   • lidocaine (XYLOCAINE) 1 % injection 2 mL  2 mL Injection  José Luis Palmer DPLSE   2 mL at 05/25/22 1343   • triamcinolone acetonide (KENALOG-40) 40 mg/mL injection 20 mg  20 mg Intra-articular  Aga Alan, DPM   20 mg at 05/25/22 1343         Physical Examination:  /86   Ht 5' 8.5" (1.74 m)   Wt 94.8 kg (209 lb)   LMP 07/05/2023 (Exact Date) Comment: denies preg  BMI 31.32 kg/m²   Estimated body mass index is 31.32 kg/m² as calculated from the following:    Height as of this encounter: 5' 8.5" (1.74 m). Weight as of this encounter: 94.8 kg (209 lb). General: NAD, well appearing, AAOx3  HEENT: NCAT, EOMI, MMM, supple  Resp: Nonlabored  Heart: RRR  Abdomen: Soft, dense abdominal wall lipodystrophy and skin with lower skin excess, lipodystrophy of back, flanks, no palpable hernia, + diastasis, well healed Pfannenstiel  Extremities/MSK: no LE edema, no obvious deficits in ROM  Neuro: grossly intact with no obvious deficits  Skin: no obvious lesions or rashes  Breast: no palpable mass, no palpable axillary lymphadenopathy, grade II ptosis, very enlarged NAC with dense pigmentation, well healed wise pattern scars    Ayaka Davidson, DO  Plastic and Reconstructive Surgery

## 2023-07-28 NOTE — PROGRESS NOTES
Assessment and Plan:  Ms. Padmini Keith is a 34 y.o. female presenting with symptomatic macromastia and unacceptable contour of abdomen     We discussed the procedure, risks, benefits, alternatives and postoperative instructions and expectations.  I do think the patient would benefit for a BBR as her breasts are dense, heavy and ptotic and large for her body habitus. We did speak regarding vascular supple to the NAC and complications associated with revision breast surgery but given the length of time since her prior operation, I am hopeful for full revascularization and ability to utilize the inferior pedicle despite no knowledge of pedicle utilized in the initial BBR. I also think her abdomen would be reasonable to address simultaneously. I would like her to be within 10-15 lbs of her goal weight.  All patient's questions were answered and she voiced understanding. Booking form submitted.     History of Present Illness:   Ms. Padmini Keith is a 34 y.o. female presenting with symptomatic macromastia and unacceptable cosmetic appearance of abdomen. Underwent BBR at age of 23 but recurrence of macromastia that progressively worsened after birth of 3 children. Patient also discontent with abdominal contour despite increasing activity and improving diet. She is currently 20-25 lbs from her goal/sustainable weight. Doing beach body workouts. Will be more mindful of sodium intake. Did have mammogram recently and scheduled for ultrasound Monday to better assess irregularity observed on screening. Denies family history of breast CA. Denies nicotine use. Currently a 36G bra. Goal is to be proportional in contour. Reports pain, bra strap grooving, intertrigo/maceration along IMF.         Review of Systems:  A 12 point ROS was performed and negative except per HPI.     Past Medical History:  Medical History        Past Medical History:   Diagnosis Date   • Abnormal Pap smear of cervix 2007   • AMA (advanced maternal age) multigravida 35+     • Cystic fibrosis carrier     • Disease of thyroid gland       hashimotos thyroiditis, nodule, hypo   • Female infertility       3 years of infertilty treatments; this is a spontaneous pregnancy   • HPV (human papilloma virus) infection     • Migraine     • PVC (premature ventricular contraction)       daily had them intermittently   • Varicella       childhood   • Vitiligo              Past Surgical History:  Surgical History   Past Surgical History:   Procedure Laterality Date   •  SECTION      •  SECTION   2019   • COLPOSCOPY       • DILATION AND CURETTAGE OF UTERUS         age 19 with TOP, and SAB in 2017   • OPERATIVE HYSTEROSCOPY         removal of septum   • AR  DELIVERY ONLY N/A 2019     Procedure:  SECTION () REPEAT;  Surgeon: Sallie Segvoia MD;  [facility];  Service: Obstetrics   • AR  DELIVERY ONLY N/A 10/27/2020     Procedure:  SECTION () REPEAT;  Surgeon: Stevan Arizmendi MD;  [facility];  Service: Obstetrics   • AR EXC B9 LESION MRGN XCP SK TG T/A/L 3.1-4.0 CM Left 10/10/2022     Procedure: EXCISION of LEFT SHOULDER MASS;  Surgeon: Janey Lopez DO;  [facility];  Service: General   • AR LIG/TRNSXJ FALOPIAN TUBE  DEL/ABDML SURG Bilateral 10/27/2020     Procedure: LIGATION/COAGULATION TUBAL;  Surgeon: Stevan Arizmendi MD;  [facility];  Service: Obstetrics   • REDUCTION MAMMAPLASTY Bilateral       ? • TUBAL LIGATION                Social History:  Social History            Tobacco Use   • Smoking status: Never   • Smokeless tobacco: Never   Vaping Use   • Vaping Use: Never used   Substance Use Topics   • Alcohol use:  Yes       Alcohol/week: 2.0 standard drinks of alcohol       Types: 2 Cans of beer per week   • Drug use: Never         Family History:  Family History         Family History   Problem Relation Age of Onset   • Hypertension Mother     • Multiple sclerosis Mother     • Hashimoto's thyroiditis Mother     • Factor V Leiden deficiency Mother     • Other Mother           ms   • Transient ischemic attack Father     • Thyroid disease Sister     • Factor V Leiden deficiency Sister     • Factor V Leiden deficiency Sister     • Celiac disease Sister     • No Known Problems Daughter     • No Known Problems Daughter     • COPD Maternal Grandmother     • Deep vein thrombosis Maternal Grandfather     • Diabetes Paternal Grandmother           TYPE 2   • Cancer Paternal Grandfather           [email or web address]   • Hashimoto's thyroiditis Brother     • Lupus Brother     • No Known Problems Son     • Cystic fibrosis Maternal Aunt 8   • No Known Problems Maternal Uncle     • No Known Problems Paternal Aunt     • No Known Problems Paternal Aunt     • No Known Problems Paternal Aunt     • No Known Problems Paternal Uncle     • Breast cancer Neg Hx     • Colon cancer Neg Hx              Allergies:  No Known Allergies     Medications:         Current Outpatient Medications on File Prior to Visit   Medication Sig Dispense Refill   • cholecalciferol (VITAMIN D3) 25 mcg (1,000 units) tablet Take 4,000 units daily.       • FLUoxetine (PROzac) 10 mg capsule Take 1 capsule (10 mg total) by mouth daily 30 capsule 3   • sertraline (ZOLOFT) 25 mg tablet Take 1 tablet (25 mg total) by mouth daily (Patient not taking: Reported on 4/21/2023) 90 tablet 3   • spironolactone (ALDACTONE) 25 mg tablet Take 1 tablet daily 90 tablet 0   • Synthroid 137 MCG tablet Take 1 tablet (137 mcg total) by mouth daily 90 tablet 3   • tacrolimus (PROTOPIC) 0.1 % ointment Apply twice daily on Saturdays and Sundays to areas of lost pigmentation on body. 100 g 1   • tretinoin (RETIN-A) 0.025 % cream Apply topically daily at bedtime Spread one pea-sized amount of medication over entire face about one hour before bedtime.  45 g 0                Current Facility-Administered Medications on File Prior to Visit   Medication Dose Route Frequency Provider Last Rate Last Admin   • lidocaine (XYLOCAINE) 1 % injection 2 mL  2 mL Injection   Eugena Nickels, DPM   2 mL at 05/25/22 1343   • triamcinolone acetonide (KENALOG-40) 40 mg/mL injection 20 mg  20 mg Intra-articular   Eugena Nickels, DPM   20 mg at 05/25/22 1343            Physical Examination:  /86   Ht 5' 8.5" (1.74 m)   Wt 94.8 kg (209 lb)   LMP 07/05/2023 (Exact Date) Comment: denies preg  BMI 31.32 kg/m²   Estimated body mass index is 31.32 kg/m² as calculated from the following:    Height as of this encounter: 5' 8.5" (1.74 m). Weight as of this encounter: 94.8 kg (209 lb). General: NAD, well appearing, AAOx3  HEENT: NCAT, EOMI, MMM, supple  Resp: Nonlabored  Heart: RRR  Abdomen: Soft, dense abdominal wall lipodystrophy and skin with lower skin excess, lipodystrophy of back, flanks, no palpable hernia, + diastasis, well healed Pfannenstiel  Extremities/MSK: no LE edema, no obvious deficits in ROM  Neuro: grossly intact with no obvious deficits  Skin: no obvious lesions or rashes  Breast: no palpable mass, no palpable axillary lymphadenopathy, grade II ptosis, very enlarged NAC with dense pigmentation, well healed wise pattern scars     Ayaka Davidson, DO  Plastic and Reconstructive Surgery

## 2023-07-31 ENCOUNTER — HOSPITAL ENCOUNTER (OUTPATIENT)
Dept: ULTRASOUND IMAGING | Facility: CLINIC | Age: 38
Discharge: HOME | End: 2023-07-31
Payer: COMMERCIAL

## 2023-07-31 DIAGNOSIS — R92.8 ABNORMAL SCREENING MAMMOGRAM: ICD-10-CM

## 2023-07-31 PROCEDURE — 76642 ULTRASOUND BREAST LIMITED: CPT

## 2023-08-30 ENCOUNTER — APPOINTMENT (OUTPATIENT)
Dept: LAB | Facility: CLINIC | Age: 38
End: 2023-08-30
Payer: COMMERCIAL

## 2023-08-30 DIAGNOSIS — Z13.220 LIPID SCREENING: ICD-10-CM

## 2023-08-30 DIAGNOSIS — R53.83 FATIGUE, UNSPECIFIED TYPE: ICD-10-CM

## 2023-08-30 DIAGNOSIS — E55.9 VITAMIN D DEFICIENCY: ICD-10-CM

## 2023-08-30 DIAGNOSIS — Z13.1 DIABETES MELLITUS SCREENING: ICD-10-CM

## 2023-08-30 DIAGNOSIS — E03.9 HYPOTHYROIDISM, UNSPECIFIED TYPE: ICD-10-CM

## 2023-08-30 LAB
25(OH)D3 SERPL-MCNC: 45.4 NG/ML (ref 30–100)
ALBUMIN SERPL BCP-MCNC: 4.1 G/DL (ref 3.5–5)
ALP SERPL-CCNC: 65 U/L (ref 34–104)
ALT SERPL W P-5'-P-CCNC: 12 U/L (ref 7–52)
ANION GAP SERPL CALCULATED.3IONS-SCNC: 9 MMOL/L
AST SERPL W P-5'-P-CCNC: 16 U/L (ref 13–39)
BASOPHILS # BLD AUTO: 0.02 THOUSANDS/ÂΜL (ref 0–0.1)
BASOPHILS NFR BLD AUTO: 0 % (ref 0–1)
BILIRUB SERPL-MCNC: 0.49 MG/DL (ref 0.2–1)
BUN SERPL-MCNC: 15 MG/DL (ref 5–25)
CALCIUM SERPL-MCNC: 9.5 MG/DL (ref 8.4–10.2)
CHLORIDE SERPL-SCNC: 104 MMOL/L (ref 96–108)
CHOLEST SERPL-MCNC: 165 MG/DL
CO2 SERPL-SCNC: 26 MMOL/L (ref 21–32)
CREAT SERPL-MCNC: 0.96 MG/DL (ref 0.6–1.3)
EOSINOPHIL # BLD AUTO: 0.17 THOUSAND/ÂΜL (ref 0–0.61)
EOSINOPHIL NFR BLD AUTO: 3 % (ref 0–6)
ERYTHROCYTE [DISTWIDTH] IN BLOOD BY AUTOMATED COUNT: 13.1 % (ref 11.6–15.1)
EST. AVERAGE GLUCOSE BLD GHB EST-MCNC: 114 MG/DL
GFR SERPL CREATININE-BSD FRML MDRD: 74 ML/MIN/1.73SQ M
GLUCOSE P FAST SERPL-MCNC: 69 MG/DL (ref 65–99)
HBA1C MFR BLD: 5.6 %
HCT VFR BLD AUTO: 45.6 % (ref 34.8–46.1)
HDLC SERPL-MCNC: 43 MG/DL
HGB BLD-MCNC: 14.5 G/DL (ref 11.5–15.4)
IMM GRANULOCYTES # BLD AUTO: 0.02 THOUSAND/UL (ref 0–0.2)
IMM GRANULOCYTES NFR BLD AUTO: 0 % (ref 0–2)
LDLC SERPL CALC-MCNC: 94 MG/DL (ref 0–100)
LYMPHOCYTES # BLD AUTO: 2.25 THOUSANDS/ÂΜL (ref 0.6–4.47)
LYMPHOCYTES NFR BLD AUTO: 33 % (ref 14–44)
MCH RBC QN AUTO: 28.3 PG (ref 26.8–34.3)
MCHC RBC AUTO-ENTMCNC: 31.8 G/DL (ref 31.4–37.4)
MCV RBC AUTO: 89 FL (ref 82–98)
MONOCYTES # BLD AUTO: 0.45 THOUSAND/ÂΜL (ref 0.17–1.22)
MONOCYTES NFR BLD AUTO: 7 % (ref 4–12)
NEUTROPHILS # BLD AUTO: 3.93 THOUSANDS/ÂΜL (ref 1.85–7.62)
NEUTS SEG NFR BLD AUTO: 57 % (ref 43–75)
NRBC BLD AUTO-RTO: 0 /100 WBCS
PLATELET # BLD AUTO: 221 THOUSANDS/UL (ref 149–390)
PMV BLD AUTO: 12 FL (ref 8.9–12.7)
POTASSIUM SERPL-SCNC: 4.3 MMOL/L (ref 3.5–5.3)
PROT SERPL-MCNC: 6.9 G/DL (ref 6.4–8.4)
RBC # BLD AUTO: 5.13 MILLION/UL (ref 3.81–5.12)
SODIUM SERPL-SCNC: 139 MMOL/L (ref 135–147)
TRIGL SERPL-MCNC: 138 MG/DL
TSH SERPL DL<=0.05 MIU/L-ACNC: 0.68 UIU/ML (ref 0.45–4.5)
WBC # BLD AUTO: 6.84 THOUSAND/UL (ref 4.31–10.16)

## 2023-08-30 PROCEDURE — 80061 LIPID PANEL: CPT

## 2023-08-30 PROCEDURE — 83036 HEMOGLOBIN GLYCOSYLATED A1C: CPT

## 2023-08-30 PROCEDURE — 84443 ASSAY THYROID STIM HORMONE: CPT

## 2023-08-30 PROCEDURE — 85025 COMPLETE CBC W/AUTO DIFF WBC: CPT

## 2023-08-30 PROCEDURE — 80053 COMPREHEN METABOLIC PANEL: CPT

## 2023-08-30 PROCEDURE — 82306 VITAMIN D 25 HYDROXY: CPT

## 2023-08-30 PROCEDURE — 36415 COLL VENOUS BLD VENIPUNCTURE: CPT

## 2023-08-31 ENCOUNTER — PREP FOR PROCEDURE (OUTPATIENT)
Dept: PLASTIC SURGERY | Facility: CLINIC | Age: 38
End: 2023-08-31

## 2023-08-31 DIAGNOSIS — Z41.1 ENCOUNTER FOR COSMETIC PROCEDURE: ICD-10-CM

## 2023-08-31 DIAGNOSIS — N62 MACROMASTIA: Primary | ICD-10-CM

## 2023-09-18 ENCOUNTER — TELEPHONE (OUTPATIENT)
Age: 38
End: 2023-09-18

## 2023-09-18 NOTE — TELEPHONE ENCOUNTER
Patient called to schedule a follow up appt w/ Dr Gissel Bauer. Patient advised to call back in oct for her schedule.

## 2023-11-09 DIAGNOSIS — L70.0 ACNE VULGARIS: ICD-10-CM

## 2023-11-09 RX ORDER — SPIRONOLACTONE 25 MG/1
TABLET ORAL
Qty: 90 TABLET | Refills: 0 | Status: SHIPPED | OUTPATIENT
Start: 2023-11-09

## 2023-11-10 DIAGNOSIS — N94.3 PMS (PREMENSTRUAL SYNDROME): ICD-10-CM

## 2023-11-13 RX ORDER — FLUOXETINE 10 MG/1
10 CAPSULE ORAL DAILY
Qty: 90 CAPSULE | Refills: 0 | Status: SHIPPED | OUTPATIENT
Start: 2023-11-13

## 2023-11-21 ENCOUNTER — OFFICE VISIT (OUTPATIENT)
Dept: FAMILY MEDICINE CLINIC | Facility: CLINIC | Age: 38
End: 2023-11-21
Payer: COMMERCIAL

## 2023-11-21 VITALS
SYSTOLIC BLOOD PRESSURE: 114 MMHG | DIASTOLIC BLOOD PRESSURE: 70 MMHG | TEMPERATURE: 97.8 F | WEIGHT: 211 LBS | BODY MASS INDEX: 31.25 KG/M2 | HEIGHT: 69 IN | HEART RATE: 72 BPM | RESPIRATION RATE: 16 BRPM

## 2023-11-21 DIAGNOSIS — E78.2 MIXED HYPERLIPIDEMIA: Primary | ICD-10-CM

## 2023-11-21 DIAGNOSIS — E78.2 MIXED HYPERLIPIDEMIA: ICD-10-CM

## 2023-11-21 DIAGNOSIS — E66.09 CLASS 1 OBESITY DUE TO EXCESS CALORIES WITHOUT SERIOUS COMORBIDITY WITH BODY MASS INDEX (BMI) OF 30.0 TO 30.9 IN ADULT: ICD-10-CM

## 2023-11-21 DIAGNOSIS — E66.09 CLASS 1 OBESITY DUE TO EXCESS CALORIES WITHOUT SERIOUS COMORBIDITY WITH BODY MASS INDEX (BMI) OF 30.0 TO 30.9 IN ADULT: Primary | ICD-10-CM

## 2023-11-21 DIAGNOSIS — Z23 ENCOUNTER FOR IMMUNIZATION: ICD-10-CM

## 2023-11-21 PROBLEM — E66.811 CLASS 1 OBESITY DUE TO EXCESS CALORIES WITHOUT SERIOUS COMORBIDITY WITH BODY MASS INDEX (BMI) OF 30.0 TO 30.9 IN ADULT: Status: ACTIVE | Noted: 2023-11-21

## 2023-11-21 PROCEDURE — 90471 IMMUNIZATION ADMIN: CPT

## 2023-11-21 PROCEDURE — 90686 IIV4 VACC NO PRSV 0.5 ML IM: CPT

## 2023-11-21 PROCEDURE — 99214 OFFICE O/P EST MOD 30 MIN: CPT | Performed by: PHYSICIAN ASSISTANT

## 2023-11-21 NOTE — PROGRESS NOTES
Assessment/Plan:    Obesity - trial zepbound, no history of pancreatitis or thyroid cancer, will start 2.5 mg weekly for one month then increase 5 mg weekly    2. Hyperlipidemia - watch diet, repeat next summer    3. Hypothyroidism - well controlled on synthroid 137 mcg daily    F/u as needed    Subjective:   Chief Complaint   Patient presents with    Weight Check     Discuss weight loss medication      Patient ID: Kandi Frazier is a 34 y.o. female. Patient here to discuss weight loss medication. Would like to try an injectable medication. Scheduled for breast reduction in January, would really like to lose weight prior to that. Already on board with healthy eating and exercise.         The following portions of the patient's history were reviewed and updated as appropriate: allergies, current medications, past family history, past medical history, past social history, past surgical history, and problem list.    Past Medical History:   Diagnosis Date    Abnormal Pap smear of cervix     AMA (advanced maternal age) multigravida 35+     Cystic fibrosis carrier     Disease of thyroid gland     hashimotos thyroiditis, nodule, hypo    Female infertility     3 years of infertilty treatments; this is a spontaneous pregnancy    HPV (human papilloma virus) infection     Migraine     PVC (premature ventricular contraction)     daily had them intermittently    Varicella     childhood    Vitiligo      Past Surgical History:   Procedure Laterality Date     SECTION  2013     SECTION  2019    COLPOSCOPY      DILATION AND CURETTAGE OF UTERUS      age 19 with TOP, and SAB in 2017    OPERATIVE HYSTEROSCOPY      removal of septum    [place]  DELIVERY ONLY N/A 2019    Procedure:  SECTION () REPEAT;  Surgeon: Gay Snigleton MD;  Location: [facility];  Service: Obstetrics    MO  DELIVERY ONLY N/A 10/27/2020    Procedure: Rolm Sox () REPEAT;  Surgeon: Lauryn Winters Alisia Louis MD;  [facility];  Service: Obstetrics    WA EXC B9 LESION MRGN XCP SK TG T/A/L 3.1-4.0 CM Left 10/10/2022    Procedure: EXCISION of LEFT SHOULDER MASS;  Surgeon: Jacinta Azevedo DO;  [facility];  Service: General    WA LIG/TRNSXJ FALOPIAN TUBE  DEL/ABDML SURG Bilateral 10/27/2020    Procedure: LIGATION/COAGULATION TUBAL;  Surgeon: Karen Monteiro MD;  [facility];  Service: Obstetrics    REDUCTION MAMMAPLASTY Bilateral     ? TUBAL LIGATION      US GUIDED THYROID BIOPSY  2015     Family History   Problem Relation Age of Onset    Hypertension Mother     Multiple sclerosis Mother     Hashimoto's thyroiditis Mother     Factor V Leiden deficiency Mother     Other Mother         ms    Transient ischemic attack Father     Thyroid disease Sister     Factor V Leiden deficiency Sister     Factor V Leiden deficiency Sister     Celiac disease Sister     No Known Problems Daughter     No Known Problems Daughter     COPD Maternal Grandmother     Deep vein thrombosis Maternal Grandfather     Diabetes Paternal Grandmother         TYPE 2    Cancer Paternal Grandfather         leukemis    Hashimoto's thyroiditis Brother     Lupus Brother     No Known Problems Son     Cystic fibrosis Maternal Aunt 8    No Known Problems Maternal Uncle     No Known Problems Paternal Aunt     No Known Problems Paternal Aunt     No Known Problems Paternal Aunt     No Known Problems Paternal Uncle     Breast cancer Neg Hx     Colon cancer Neg Hx      Social History     Socioeconomic History    Marital status: /Civil Union     Spouse name: Not on file    Number of children: Not on file    Years of education: Not on file    Highest education level: Not on file   Occupational History    Not on file   Tobacco Use    Smoking status: Never    Smokeless tobacco: Never   Vaping Use    Vaping Use: Never used   Substance and Sexual Activity    Alcohol use:  Yes     Alcohol/week: 2.0 standard drinks of alcohol     Types: 2 Cans of beer per week    Drug use: Never    Sexual activity: Yes     Partners: Male     Birth control/protection: None   Other Topics Concern    Not on file   Social History Narrative    CAFFEINE USE     Social Determinants of Health     Financial Resource Strain: Not on file   Food Insecurity: Not on file   Transportation Needs: Not on file   Physical Activity: Not on file   Stress: Not on file   Social Connections: Not on file   Intimate Partner Violence: Not on file   Housing Stability: Not on file       Current Outpatient Medications:     cholecalciferol (VITAMIN D3) 25 mcg (1,000 units) tablet, Take 4,000 units daily. , Disp: , Rfl:     FLUoxetine (PROzac) 10 mg capsule, TAKE ONE CAPSULE BY MOUTH EVERY DAY, Disp: 90 capsule, Rfl: 0    spironolactone (ALDACTONE) 25 mg tablet, TAKE ONE TABLET BY MOUTH EVERY DAY (Patient taking differently: Take 50 mg by mouth daily TAKE ONE TABLET BY MOUTH EVERY DAY), Disp: 90 tablet, Rfl: 0    Synthroid 137 MCG tablet, Take 1 tablet (137 mcg total) by mouth daily, Disp: 90 tablet, Rfl: 3    tacrolimus (PROTOPIC) 0.1 % ointment, Apply twice daily on Saturdays and Sundays to areas of lost pigmentation on body. , Disp: 100 g, Rfl: 1    tretinoin (RETIN-A) 0.025 % cream, Apply topically daily at bedtime Spread one pea-sized amount of medication over entire face about one hour before bedtime. , Disp: 45 g, Rfl: 0    sertraline (ZOLOFT) 25 mg tablet, Take 1 tablet (25 mg total) by mouth daily (Patient not taking: Reported on 4/21/2023), Disp: 90 tablet, Rfl: 3    Current Facility-Administered Medications:     lidocaine (XYLOCAINE) 1 % injection 2 mL, 2 mL, Injection, , Abtista Staggers, DPM, 2 mL at 05/25/22 1343    triamcinolone acetonide (KENALOG-40) 40 mg/mL injection 20 mg, 20 mg, Intra-articular, , Batista Staggers, DPM, 20 mg at 05/25/22 1343    Review of Systems          Objective:    Vitals:    11/21/23 1032   BP: 114/70   Pulse: 72   Resp: 16   Temp: 97.8 °F (36.6 °C)   TempSrc: Temporal   Weight: 95.7 kg (211 lb)   Height: 5' 8.5" (1.74 m)        Physical Exam  Constitutional:       Appearance: Normal appearance. She is obese. HENT:      Head: Normocephalic and atraumatic. Neurological:      General: No focal deficit present. Mental Status: She is alert. Psychiatric:         Mood and Affect: Mood normal.         Behavior: Behavior normal.         Thought Content:  Thought content normal.         Judgment: Judgment normal.

## 2023-11-27 ENCOUNTER — TELEPHONE (OUTPATIENT)
Age: 38
End: 2023-11-27

## 2023-11-27 NOTE — TELEPHONE ENCOUNTER
Called pt and left message on VM    Appt made on 12/11 with dr Blanco Matters needs to be rescheduled, its for a vitiligo medication FU and not cosmetic

## 2023-11-29 ENCOUNTER — OFFICE VISIT (OUTPATIENT)
Dept: URGENT CARE | Facility: CLINIC | Age: 38
End: 2023-11-29
Payer: COMMERCIAL

## 2023-11-29 VITALS
TEMPERATURE: 97.6 F | SYSTOLIC BLOOD PRESSURE: 144 MMHG | HEART RATE: 79 BPM | OXYGEN SATURATION: 99 % | RESPIRATION RATE: 16 BRPM | DIASTOLIC BLOOD PRESSURE: 79 MMHG

## 2023-11-29 DIAGNOSIS — J02.9 ACUTE PHARYNGITIS, UNSPECIFIED ETIOLOGY: Primary | ICD-10-CM

## 2023-11-29 LAB — S PYO AG THROAT QL: NEGATIVE

## 2023-11-29 PROCEDURE — 87147 CULTURE TYPE IMMUNOLOGIC: CPT | Performed by: NURSE PRACTITIONER

## 2023-11-29 PROCEDURE — 99213 OFFICE O/P EST LOW 20 MIN: CPT | Performed by: NURSE PRACTITIONER

## 2023-11-29 PROCEDURE — 87070 CULTURE OTHR SPECIMN AEROBIC: CPT | Performed by: NURSE PRACTITIONER

## 2023-11-29 RX ORDER — AMOXICILLIN 500 MG/1
500 CAPSULE ORAL EVERY 12 HOURS SCHEDULED
Qty: 20 CAPSULE | Refills: 0 | Status: SHIPPED | OUTPATIENT
Start: 2023-11-29 | End: 2023-12-09

## 2023-11-29 NOTE — PROGRESS NOTES
[facility]        NAME: Albert Canales is a 34 y.o. female  : 1985    MRN: [id number]  DATE: 2023  TIME: 11:30 AM    Assessment and Plan   Acute pharyngitis, unspecified etiology [J02.9]  1. Acute pharyngitis, unspecified etiology  amoxicillin (AMOXIL) 500 mg capsule    POCT rapid strepA    Throat culture        Acute symptomatic not responding conservative treatment POCT rapid strep is negative however physical exam correlates with strep infection we will start amoxicillin 500 mg twice daily x7 days and send throat culture. Educated on side effects proper use of medication follow-up with primary care with worsening symptoms no improvement. Discussed with patient if throat culture comes back negative can stop antibiotic at that time if positive continue for full treatment. Patient verbalized understanding    Patient Instructions       Follow up with PCP in 3-5 days. Proceed to  ER if symptoms worsen. Chief Complaint     Chief Complaint   Patient presents with   • Sore Throat     Pt reports sore throat and fatigue x1 day. History of Present Illness       Patient is a 49-year-old female arrives with sore throat and fatigue started yesterday and worsening today. Denies all other symptoms at this time. Has been taking over-the-counter medication without relief        Review of Systems   Review of Systems   Constitutional:  Positive for fatigue. Negative for activity change, appetite change, chills and fever. HENT:  Positive for sore throat. Negative for congestion, postnasal drip, rhinorrhea and sneezing. Respiratory:  Negative for cough, chest tightness, shortness of breath and wheezing. Cardiovascular:  Negative for chest pain and palpitations. Gastrointestinal:  Negative for abdominal pain, constipation, diarrhea, nausea and vomiting. Musculoskeletal:  Negative for arthralgias and myalgias. Skin:  Negative for color change, pallor and rash. Neurological:  Negative for dizziness, weakness, light-headedness and headaches. Hematological:  Negative for adenopathy. Psychiatric/Behavioral:  Negative for agitation and confusion. Current Medications       Current Outpatient Medications:   •  amoxicillin (AMOXIL) 500 mg capsule, Take 1 capsule (500 mg total) by mouth every 12 (twelve) hours for 10 days, Disp: 20 capsule, Rfl: 0  •  cholecalciferol (VITAMIN D3) 25 mcg (1,000 units) tablet, Take 4,000 units daily. , Disp: , Rfl:   •  FLUoxetine (PROzac) 10 mg capsule, TAKE ONE CAPSULE BY MOUTH EVERY DAY, Disp: 90 capsule, Rfl: 0  •  sertraline (ZOLOFT) 25 mg tablet, Take 1 tablet (25 mg total) by mouth daily (Patient not taking: Reported on 4/21/2023), Disp: 90 tablet, Rfl: 3  •  spironolactone (ALDACTONE) 25 mg tablet, TAKE ONE TABLET BY MOUTH EVERY DAY (Patient taking differently: Take 50 mg by mouth daily TAKE ONE TABLET BY MOUTH EVERY DAY), Disp: 90 tablet, Rfl: 0  •  Synthroid 137 MCG tablet, Take 1 tablet (137 mcg total) by mouth daily, Disp: 90 tablet, Rfl: 3  •  tacrolimus (PROTOPIC) 0.1 % ointment, Apply twice daily on Saturdays and Sundays to areas of lost pigmentation on body. , Disp: 100 g, Rfl: 1  •  tirzepatide 2.5 MG/0.5ML, Inject 0.5 mL (2.5 mg total) under the skin every 7 days, Disp: 2 mL, Rfl: 0  •  tretinoin (RETIN-A) 0.025 % cream, Apply topically daily at bedtime Spread one pea-sized amount of medication over entire face about one hour before bedtime. , Disp: 45 g, Rfl: 0    Current Facility-Administered Medications:   •  lidocaine (XYLOCAINE) 1 % injection 2 mL, 2 mL, Injection, , Lynne Staggers, DPM, 2 mL at 05/25/22 1343  •  triamcinolone acetonide (KENALOG-40) 40 mg/mL injection 20 mg, 20 mg, Intra-articular, , Batista Staggers, DPM, 20 mg at 05/25/22 1343    Current Allergies     Allergies as of 11/29/2023   • (No Known Allergies)            The following portions of the patient's history were reviewed and updated as appropriate: allergies, current medications, past family history, past medical history, past social history, past surgical history and problem list.     Past Medical History:   Diagnosis Date   • Abnormal Pap smear of cervix    • AMA (advanced maternal age) multigravida 35+    • Cystic fibrosis carrier    • Disease of thyroid gland     hashimotos thyroiditis, nodule, hypo   • Female infertility     3 years of infertilty treatments; this is a spontaneous pregnancy   • HPV (human papilloma virus) infection    • Migraine    • PVC (premature ventricular contraction)     daily had them intermittently   • Varicella     childhood   • Vitiligo        Past Surgical History:   Procedure Laterality Date   •  SECTION     •  SECTION  2019   • COLPOSCOPY     • DILATION AND CURETTAGE OF UTERUS      age 19 with TOP, and SAB in 2017   • OPERATIVE HYSTEROSCOPY      removal of septum   • [place]  DELIVERY ONLY N/A 2019    Procedure:  SECTION () REPEAT;  Surgeon: Yonny Gonzalez MD;  [facility];  Service: Obstetrics   • [place]  DELIVERY ONLY N/A 10/27/2020    Procedure:  SECTION () REPEAT;  Surgeon: Shilpi Conway MD;  [facility];  Service: Obstetrics   • GA EXC B9 LESION MRGN XCP SK TG T/A/L 3.1-4.0 CM Left 10/10/2022    Procedure: EXCISION of LEFT SHOULDER MASS;  Surgeon: Silviano Álvarez DO;  [facility];  Service: General   • [place] LIG/TRNSXJ FALOPIAN TUBE  DEL/ABDML SURG Bilateral 10/27/2020    Procedure: LIGATION/COAGULATION TUBAL;  Surgeon: Shilpi Conway MD;  [facility];  Service: Obstetrics   • REDUCTION MAMMAPLASTY Bilateral     ?    • TUBAL LIGATION     • US GUIDED THYROID BIOPSY  2015       Family History   Problem Relation Age of Onset   • Hypertension Mother    • Multiple sclerosis Mother    • Hashimoto's thyroiditis Mother    • Factor V Leiden deficiency Mother    • Other Mother         ms   • Transient ischemic attack Father    • Thyroid disease Sister    • Factor V Leiden deficiency Sister    • Factor V Leiden deficiency Sister    • Celiac disease Sister    • No Known Problems Daughter    • No Known Problems Daughter    • COPD Maternal Grandmother    • Deep vein thrombosis Maternal Grandfather    • Diabetes Paternal Grandmother         TYPE 2   • Cancer Paternal Grandfather         [email or web address]   • Hashimoto's thyroiditis Brother    • Lupus Brother    • No Known Problems Son    • Cystic fibrosis Maternal Aunt 8   • No Known Problems Maternal Uncle    • No Known Problems Paternal Aunt    • No Known Problems Paternal Aunt    • No Known Problems Paternal Aunt    • No Known Problems Paternal Uncle    • Breast cancer Neg Hx    • Colon cancer Neg Hx          Medications have been verified. Objective   /79   Pulse 79   Temp 97.6 °F (36.4 °C)   Resp 16   SpO2 99%   No LMP recorded. Physical Exam     Physical Exam  Vitals and nursing note reviewed. Constitutional:       General: She is not in acute distress. Appearance: Normal appearance. She is ill-appearing. She is not diaphoretic. HENT:      Head: Normocephalic and atraumatic. Right Ear: Tympanic membrane, ear canal and external ear normal. There is no impacted cerumen. Left Ear: Tympanic membrane, ear canal and external ear normal. There is no impacted cerumen. Nose: No congestion or rhinorrhea. Mouth/Throat:      Pharynx: Uvula midline. Posterior oropharyngeal erythema present. No pharyngeal swelling or uvula swelling. Eyes:      General: No scleral icterus. Right eye: No discharge. Left eye: No discharge. Cardiovascular:      Rate and Rhythm: Normal rate and regular rhythm. Pulmonary:      Effort: Pulmonary effort is normal. No respiratory distress. Breath sounds: No stridor. No wheezing, rhonchi or rales. Musculoskeletal:         General: Normal range of motion.       Cervical back: Normal range of motion. Lymphadenopathy:      Cervical: Cervical adenopathy present. Skin:     Coloration: Skin is not jaundiced or pale. Findings: No bruising, erythema or rash. Neurological:      General: No focal deficit present. Mental Status: She is alert and oriented to person, place, and time. Psychiatric:         Mood and Affect: Mood normal.         Behavior: Behavior normal.         Thought Content:  Thought content normal.         Judgment: Judgment normal.

## 2023-11-30 ENCOUNTER — TELEPHONE (OUTPATIENT)
Dept: URGENT CARE | Facility: CLINIC | Age: 38
End: 2023-11-30

## 2023-12-01 LAB — BACTERIA THROAT CULT: ABNORMAL

## 2023-12-04 DIAGNOSIS — E03.9 HYPOTHYROIDISM, UNSPECIFIED TYPE: ICD-10-CM

## 2023-12-04 RX ORDER — LEVOTHYROXINE SODIUM 137 MCG
137 TABLET ORAL DAILY
Qty: 90 TABLET | Refills: 0 | Status: SHIPPED | OUTPATIENT
Start: 2023-12-04

## 2023-12-05 DIAGNOSIS — E66.9 OBESITY (BMI 30.0-34.9): Primary | ICD-10-CM

## 2023-12-08 NOTE — TELEPHONE ENCOUNTER
Rec'd return call from patient. Explained Dr. Radha Pimentel - cosmetic only. Patient verbalized understanding. (Was on wait listed for Dr. Lucretia Gill)    Scheduled on 1/3/2024 @ 4:20 pm in [place] location. Patient aware of office location. I apologized for inconvenience and thanked patient for her understanding.

## 2023-12-13 ENCOUNTER — COSMETIC (OUTPATIENT)
Dept: PLASTIC SURGERY | Facility: CLINIC | Age: 38
End: 2023-12-13

## 2023-12-13 DIAGNOSIS — Z41.1 ENCOUNTER FOR COSMETIC SURGERY: Primary | ICD-10-CM

## 2023-12-13 PROCEDURE — RECHECK: Performed by: STUDENT IN AN ORGANIZED HEALTH CARE EDUCATION/TRAINING PROGRAM

## 2023-12-13 PROCEDURE — CP99022 PRE-OP COSMETIC EVALUATION: Performed by: STUDENT IN AN ORGANIZED HEALTH CARE EDUCATION/TRAINING PROGRAM

## 2023-12-13 NOTE — PROGRESS NOTES
Assessment and Plan:  Ms. Jose Lyons is a 34 y.o. female presenting preop for revision BBR and abdominoplasty    We discussed the procedure, risks, benefits, alternatives and postoperative instructions and expectations. Informed consent obtained. L bra. We did discuss the cost of pathology in a cosmetic procedure. I do think the majority of the specimen will be skin. She would NOT like the specimen sent for pathology unless there is an abnormality I note during the operation. She does permit pathology at my discretion. The patient is responsible and diligent with her breast imaging. All patient's questions were answered and she voiced understanding. History of Present Illness:   Ms. Jose Lyons is a 34 y.o. female presenting preop for revision BBR and abdominoplasty. Insurance has denied appeal process. Patient will perform self appeal.  I am happy to support this in any possible way. Review of Systems:  A 12 point ROS was performed and negative except per HPI.     Past Medical History:  Past Medical History:   Diagnosis Date    Abnormal Pap smear of cervix     AMA (advanced maternal age) multigravida 35+     Cystic fibrosis carrier     Disease of thyroid gland     hashimotos thyroiditis, nodule, hypo    Female infertility     3 years of infertilty treatments; this is a spontaneous pregnancy    HPV (human papilloma virus) infection     Migraine     PVC (premature ventricular contraction)     daily had them intermittently    Varicella     childhood    Vitiligo        Past Surgical History:  Past Surgical History:   Procedure Laterality Date     SECTION  2013     SECTION  2019    COLPOSCOPY      DILATION AND CURETTAGE OF UTERUS      age 19 with TOP, and SAB in 2017    OPERATIVE HYSTEROSCOPY      removal of septum    [place]  DELIVERY ONLY N/A 2019    Procedure:  SECTION () REPEAT;  Surgeon: Penelope Perez MD;  [facility]: [facility] ;  Service: Obstetrics    [place]  DELIVERY ONLY N/A 10/27/2020    Procedure:  SECTION () REPEAT;  Surgeon: Dami Addison MD;  [facility];  Service: Obstetrics    MI EXC B9 LESION MRGN XCP SK TG T/A/L 3.1-4.0 CM Left 10/10/2022    Procedure: EXCISION of LEFT SHOULDER MASS;  Surgeon: Owen Blair DO;  [facility];  Service: General    MI LIG/TRNSXJ FALOPIAN TUBE  DEL/ABDML SURG Bilateral 10/27/2020    Procedure: LIGATION/COAGULATION TUBAL;  Surgeon: Dami Addison MD;  [facility];  Service: Obstetrics    REDUCTION MAMMAPLASTY Bilateral     ? TUBAL LIGATION      US GUIDED THYROID BIOPSY  2015       Social History:  Social History     Tobacco Use    Smoking status: Never    Smokeless tobacco: Never   Vaping Use    Vaping status: Never Used   Substance Use Topics    Alcohol use:  Yes     Alcohol/week: 2.0 standard drinks of alcohol     Types: 2 Cans of beer per week    Drug use: Never       Family History:  Family History   Problem Relation Age of Onset    Hypertension Mother     Multiple sclerosis Mother     Hashimoto's thyroiditis Mother     Factor V Leiden deficiency Mother     Other Mother         ms    Transient ischemic attack Father     Thyroid disease Sister     Factor V Leiden deficiency Sister     Factor V Leiden deficiency Sister     Celiac disease Sister     No Known Problems Daughter     No Known Problems Daughter     COPD Maternal Grandmother     Deep vein thrombosis Maternal Grandfather     Diabetes Paternal Grandmother         TYPE 2    Cancer Paternal Grandfather         leukemis    Hashimoto's thyroiditis Brother     Lupus Brother     No Known Problems Son     Cystic fibrosis Maternal Aunt 8    No Known Problems Maternal Uncle     No Known Problems Paternal Aunt     No Known Problems Paternal Aunt     No Known Problems Paternal Aunt     No Known Problems Paternal Uncle     Breast cancer Neg Hx     Colon cancer Neg Hx        Allergies:  No Known Allergies    Medications:  Current Outpatient Medications on File Prior to Visit   Medication Sig Dispense Refill    cholecalciferol (VITAMIN D3) 25 mcg (1,000 units) tablet Take 4,000 units daily. FLUoxetine (PROzac) 10 mg capsule TAKE ONE CAPSULE BY MOUTH EVERY DAY 90 capsule 0    Semaglutide-Weight Management (WEGOVY) 0.25 MG/0.5ML Inject 0.5 mL (0.25 mg total) under the skin once a week for 28 days 2 mL 0    sertraline (ZOLOFT) 25 mg tablet Take 1 tablet (25 mg total) by mouth daily (Patient not taking: Reported on 4/21/2023) 90 tablet 3    spironolactone (ALDACTONE) 25 mg tablet TAKE ONE TABLET BY MOUTH EVERY DAY (Patient taking differently: Take 50 mg by mouth daily TAKE ONE TABLET BY MOUTH EVERY DAY) 90 tablet 0    Synthroid 137 MCG tablet Take 1 tablet (137 mcg total) by mouth daily 90 tablet 0    tacrolimus (PROTOPIC) 0.1 % ointment Apply twice daily on Saturdays and Sundays to areas of lost pigmentation on body. 100 g 1    tretinoin (RETIN-A) 0.025 % cream Apply topically daily at bedtime Spread one pea-sized amount of medication over entire face about one hour before bedtime. 45 g 0     Current Facility-Administered Medications on File Prior to Visit   Medication Dose Route Frequency Provider Last Rate Last Admin    lidocaine (XYLOCAINE) 1 % injection 2 mL  2 mL Injection  Luberta Senna, DPM   2 mL at 05/25/22 1343    triamcinolone acetonide (KENALOG-40) 40 mg/mL injection 20 mg  20 mg Intra-articular  Luberta Senna, DPM   20 mg at 05/25/22 1343         Physical Examination:  There were no vitals taken for this visit. Estimated body mass index is 31.62 kg/m² as calculated from the following:    Height as of 11/21/23: 5' 8.5" (1.74 m). Weight as of 11/21/23: 95.7 kg (211 lb).   General: NAD, well appearing, AAOx3  HEENT: NCAT, EOMI, MMM, supple  Resp: Nonlabored  Heart: RRR  Abdomen: Soft, mild distension, lipodystrophy of back/flanks/abdomen with diastasis and well healed Pfannenstiel incision  Extremities/MSK: no LE edema, no obvious deficits in ROM  Neuro: grossly intact with no obvious deficits  Skin: no obvious lesions or rashes  Breast: no palpable mass, no palpable axillary lymphadenopathy,  grade II ptosis, obliteration of IMF with large NAC with former wise pattern scars      Ayaka Davidson, DO  Plastic and Reconstructive Surgery

## 2023-12-13 NOTE — H&P (VIEW-ONLY)
Assessment and Plan:  Ms. Houser is a 38 y.o. female presenting preop for revision BBR and abdominoplasty    We discussed the procedure, risks, benefits, alternatives and postoperative instructions and expectations.  Informed consent obtained.  L bra.  We did discuss the cost of pathology in a cosmetic procedure.  I do think the majority of the specimen will be skin.  She would NOT like the specimen sent for pathology unless there is an abnormality I note during the operation.  She does permit pathology at my discretion.  The patient is responsible and diligent with her breast imaging.  All patient's questions were answered and she voiced understanding.    History of Present Illness:   Ms. Houser is a 38 y.o. female presenting preop for revision BBR and abdominoplasty.  Insurance has denied appeal process.  Patient will perform self appeal.  I am happy to support this in any possible way.      Review of Systems:  A 12 point ROS was performed and negative except per HPI.    Past Medical History:  Past Medical History:   Diagnosis Date    Abnormal Pap smear of cervix     AMA (advanced maternal age) multigravida 35+     Cystic fibrosis carrier     Disease of thyroid gland     hashimotos thyroiditis, nodule, hypo    Female infertility     3 years of infertilty treatments; this is a spontaneous pregnancy    HPV (human papilloma virus) infection     Migraine     PVC (premature ventricular contraction)     daily had them intermittently    Varicella     childhood    Vitiligo        Past Surgical History:  Past Surgical History:   Procedure Laterality Date     SECTION  2013     SECTION  2019    COLPOSCOPY      DILATION AND CURETTAGE OF UTERUS      age 18 with TOP, and SAB in 2017    OPERATIVE HYSTEROSCOPY      removal of septum    [place]  DELIVERY ONLY N/A 2019    Procedure:  SECTION () REPEAT;  Surgeon: Sheba Velasquez MD;  Location: [facility];  Service: Obstetrics    [place]   DELIVERY ONLY N/A 10/27/2020    Procedure:  SECTION () REPEAT;  Surgeon: Kamini Garcia MD;  [facility];  Service: Obstetrics    MI EXC B9 LESION MRGN XCP SK TG T/A/L 3.1-4.0 CM Left 10/10/2022    Procedure: EXCISION of LEFT SHOULDER MASS;  Surgeon: Angel Winter DO;  [facility];  Service: General    MI LIG/TRNSXJ FALOPIAN TUBE  DEL/ABDML SURG Bilateral 10/27/2020    Procedure: LIGATION/COAGULATION TUBAL;  Surgeon: Kamini Garcia MD;  [facility];  Service: Obstetrics    REDUCTION MAMMAPLASTY Bilateral     ?    TUBAL LIGATION      US GUIDED THYROID BIOPSY  2015       Social History:  Social History     Tobacco Use    Smoking status: Never    Smokeless tobacco: Never   Vaping Use    Vaping status: Never Used   Substance Use Topics    Alcohol use: Yes     Alcohol/week: 2.0 standard drinks of alcohol     Types: 2 Cans of beer per week    Drug use: Never       Family History:  Family History   Problem Relation Age of Onset    Hypertension Mother     Multiple sclerosis Mother     Hashimoto's thyroiditis Mother     Factor V Leiden deficiency Mother     Other Mother         ms    Transient ischemic attack Father     Thyroid disease Sister     Factor V Leiden deficiency Sister     Factor V Leiden deficiency Sister     Celiac disease Sister     No Known Problems Daughter     No Known Problems Daughter     COPD Maternal Grandmother     Deep vein thrombosis Maternal Grandfather     Diabetes Paternal Grandmother         TYPE 2    Cancer Paternal Grandfather         leukemis    Hashimoto's thyroiditis Brother     Lupus Brother     No Known Problems Son     Cystic fibrosis Maternal Aunt 8    No Known Problems Maternal Uncle     No Known Problems Paternal Aunt     No Known Problems Paternal Aunt     No Known Problems Paternal Aunt     No Known Problems Paternal Uncle     Breast cancer Neg Hx     Colon cancer Neg Hx        Allergies:  No Known  "Allergies    Medications:  Current Outpatient Medications on File Prior to Visit   Medication Sig Dispense Refill    cholecalciferol (VITAMIN D3) 25 mcg (1,000 units) tablet Take 4,000 units daily.      FLUoxetine (PROzac) 10 mg capsule TAKE ONE CAPSULE BY MOUTH EVERY DAY 90 capsule 0    Semaglutide-Weight Management (WEGOVY) 0.25 MG/0.5ML Inject 0.5 mL (0.25 mg total) under the skin once a week for 28 days 2 mL 0    sertraline (ZOLOFT) 25 mg tablet Take 1 tablet (25 mg total) by mouth daily (Patient not taking: Reported on 4/21/2023) 90 tablet 3    spironolactone (ALDACTONE) 25 mg tablet TAKE ONE TABLET BY MOUTH EVERY DAY (Patient taking differently: Take 50 mg by mouth daily TAKE ONE TABLET BY MOUTH EVERY DAY) 90 tablet 0    Synthroid 137 MCG tablet Take 1 tablet (137 mcg total) by mouth daily 90 tablet 0    tacrolimus (PROTOPIC) 0.1 % ointment Apply twice daily on Saturdays and Sundays to areas of lost pigmentation on body. 100 g 1    tretinoin (RETIN-A) 0.025 % cream Apply topically daily at bedtime Spread one pea-sized amount of medication over entire face about one hour before bedtime. 45 g 0     Current Facility-Administered Medications on File Prior to Visit   Medication Dose Route Frequency Provider Last Rate Last Admin    lidocaine (XYLOCAINE) 1 % injection 2 mL  2 mL Injection  Simone Awan DPM   2 mL at 05/25/22 1343    triamcinolone acetonide (KENALOG-40) 40 mg/mL injection 20 mg  20 mg Intra-articular  Simone Awan DPM   20 mg at 05/25/22 1343         Physical Examination:  There were no vitals taken for this visit.  Estimated body mass index is 31.62 kg/m² as calculated from the following:    Height as of 11/21/23: 5' 8.5\" (1.74 m).    Weight as of 11/21/23: 95.7 kg (211 lb).  General: NAD, well appearing, AAOx3  HEENT: NCAT, EOMI, MMM, supple  Resp: Nonlabored  Heart: RRR  Abdomen: Soft, mild distension, lipodystrophy of back/flanks/abdomen with diastasis and well healed Pfannenstiel " incision  Extremities/MSK: no LE edema, no obvious deficits in ROM  Neuro: grossly intact with no obvious deficits  Skin: no obvious lesions or rashes  Breast: no palpable mass, no palpable axillary lymphadenopathy,  grade II ptosis, obliteration of IMF with large NAC with former wise pattern scars      Ayaka Davidson, DO  Plastic and Reconstructive Surgery

## 2023-12-28 ENCOUNTER — TELEPHONE (OUTPATIENT)
Dept: PLASTIC SURGERY | Facility: CLINIC | Age: 38
End: 2023-12-28

## 2024-01-03 ENCOUNTER — TELEPHONE (OUTPATIENT)
Dept: DERMATOLOGY | Facility: CLINIC | Age: 39
End: 2024-01-03

## 2024-01-03 ENCOUNTER — TELEPHONE (OUTPATIENT)
Age: 39
End: 2024-01-03

## 2024-01-03 NOTE — TELEPHONE ENCOUNTER
Received call from patient to chaparro her appt after receiving a call from us due to Dr potter being out sick.      She is asking for a call from the office for a sooner appt.    Please advise

## 2024-01-03 NOTE — TELEPHONE ENCOUNTER
Lvm to pt in regards of rescheduling appt for this afternoon (1/3) as dr. Rivas is out of office. Pt should be scheduled in soonest available, included cb number in message.

## 2024-01-08 NOTE — PRE-PROCEDURE INSTRUCTIONS
Pre-Surgery Instructions:   Medication Instructions    cholecalciferol (VITAMIN D3) 25 mcg (1,000 units) tablet Hold day of surgery.    FLUoxetine (PROzac) 10 mg capsule Take day of surgery.    spironolactone (ALDACTONE) 25 mg tablet Hold day of surgery.    Synthroid 137 MCG tablet Take day of surgery.    tacrolimus (PROTOPIC) 0.1 % ointment Hold day of surgery.    tretinoin (RETIN-A) 0.025 % cream Hold day of surgery.   Medication instructions for day surgery reviewed. Please use only a sip of water to take your instructed medications. Avoid all over the counter vitamins, supplements and NSAIDS for one week prior to surgery per anesthesia guidelines. Tylenol is ok to take as needed.     You will receive a call one business day prior to surgery with an arrival time and hospital directions. If your surgery is scheduled on a Monday, the hospital will be calling you on the Friday prior to your surgery. If you have not heard from anyone by 8pm, please call the hospital supervisor through the hospital  at [phone]. (Mychal [phone]).    Do not eat or drink anything after midnight the night before your surgery, including candy, mints, lifesavers, or chewing gum. Do not drink alcohol 24hrs before your surgery. Try not to smoke at least 24hrs before your surgery.       Follow the pre surgery showering instructions as listed in the “My Surgical Experience Booklet” or otherwise provided by your surgeon's office. Do not use a blade to shave the surgical area 1 week before surgery. It is okay to use a clean electric clippers up to 24 hours before surgery. Do not apply any lotions, creams, including makeup, cologne, deodorant, or perfumes after showering on the day of your surgery. Do not use dry shampoo, hair spray, hair gel, or any type of hair products.     No contact lenses, eye make-up, or artificial eyelashes. Remove nail polish, including gel polish, and any artificial, gel, or acrylic nails if possible.  Remove all jewelry including rings and body piercing jewelry.     Wear causal clothing that is easy to take on and off. Consider your type of surgery.    Keep any valuables, jewelry, piercings at home. Please bring any specially ordered equipment (sling, braces) if indicated.    Arrange for a responsible person to drive you to and from the hospital on the day of your surgery. Visitor Guidelines discussed.     Call the surgeon's office with any new illnesses, exposures, or additional questions prior to surgery.    Please reference your “My Surgical Experience Booklet” for additional information to prepare for your upcoming surgery.

## 2024-01-09 ENCOUNTER — APPOINTMENT (OUTPATIENT)
Dept: LAB | Facility: CLINIC | Age: 39
End: 2024-01-09
Payer: COMMERCIAL

## 2024-01-09 DIAGNOSIS — Z41.1 ENCOUNTER FOR COSMETIC PROCEDURE: ICD-10-CM

## 2024-01-09 DIAGNOSIS — N62 MACROMASTIA: ICD-10-CM

## 2024-01-09 LAB
ANION GAP SERPL CALCULATED.3IONS-SCNC: 11 MMOL/L
BASOPHILS # BLD AUTO: 0.05 THOUSANDS/ÂΜL (ref 0–0.1)
BASOPHILS NFR BLD AUTO: 1 % (ref 0–1)
BUN SERPL-MCNC: 13 MG/DL (ref 5–25)
CALCIUM SERPL-MCNC: 9.7 MG/DL (ref 8.4–10.2)
CHLORIDE SERPL-SCNC: 105 MMOL/L (ref 96–108)
CO2 SERPL-SCNC: 24 MMOL/L (ref 21–32)
CREAT SERPL-MCNC: 0.93 MG/DL (ref 0.6–1.3)
EOSINOPHIL # BLD AUTO: 0.15 THOUSAND/ÂΜL (ref 0–0.61)
EOSINOPHIL NFR BLD AUTO: 2 % (ref 0–6)
ERYTHROCYTE [DISTWIDTH] IN BLOOD BY AUTOMATED COUNT: 12.8 % (ref 11.6–15.1)
GFR SERPL CREATININE-BSD FRML MDRD: 77 ML/MIN/1.73SQ M
GLUCOSE P FAST SERPL-MCNC: 108 MG/DL (ref 65–99)
HCT VFR BLD AUTO: 43.3 % (ref 34.8–46.1)
HGB BLD-MCNC: 14 G/DL (ref 11.5–15.4)
IMM GRANULOCYTES # BLD AUTO: 0.02 THOUSAND/UL (ref 0–0.2)
IMM GRANULOCYTES NFR BLD AUTO: 0 % (ref 0–2)
LYMPHOCYTES # BLD AUTO: 2.77 THOUSANDS/ÂΜL (ref 0.6–4.47)
LYMPHOCYTES NFR BLD AUTO: 38 % (ref 14–44)
MCH RBC QN AUTO: 28.6 PG (ref 26.8–34.3)
MCHC RBC AUTO-ENTMCNC: 32.3 G/DL (ref 31.4–37.4)
MCV RBC AUTO: 88 FL (ref 82–98)
MONOCYTES # BLD AUTO: 0.44 THOUSAND/ÂΜL (ref 0.17–1.22)
MONOCYTES NFR BLD AUTO: 6 % (ref 4–12)
NEUTROPHILS # BLD AUTO: 3.88 THOUSANDS/ÂΜL (ref 1.85–7.62)
NEUTS SEG NFR BLD AUTO: 53 % (ref 43–75)
NRBC BLD AUTO-RTO: 0 /100 WBCS
PLATELET # BLD AUTO: 210 THOUSANDS/UL (ref 149–390)
PMV BLD AUTO: 10.8 FL (ref 8.9–12.7)
POTASSIUM SERPL-SCNC: 4.3 MMOL/L (ref 3.5–5.3)
RBC # BLD AUTO: 4.9 MILLION/UL (ref 3.81–5.12)
SODIUM SERPL-SCNC: 140 MMOL/L (ref 135–147)
WBC # BLD AUTO: 7.31 THOUSAND/UL (ref 4.31–10.16)

## 2024-01-09 PROCEDURE — 80048 BASIC METABOLIC PNL TOTAL CA: CPT

## 2024-01-09 PROCEDURE — 85025 COMPLETE CBC W/AUTO DIFF WBC: CPT

## 2024-01-09 PROCEDURE — 36415 COLL VENOUS BLD VENIPUNCTURE: CPT

## 2024-01-10 ENCOUNTER — ANESTHESIA EVENT (OUTPATIENT)
Dept: PERIOP | Facility: HOSPITAL | Age: 39
End: 2024-01-10
Payer: COMMERCIAL

## 2024-01-10 DIAGNOSIS — Z41.1 ENCOUNTER FOR COSMETIC SURGERY: Primary | ICD-10-CM

## 2024-01-10 RX ORDER — GABAPENTIN 300 MG/1
300 CAPSULE ORAL 3 TIMES DAILY
Qty: 15 CAPSULE | Refills: 0 | Status: SHIPPED | OUTPATIENT
Start: 2024-01-10 | End: 2024-01-15

## 2024-01-10 RX ORDER — IBUPROFEN 800 MG/1
800 TABLET ORAL EVERY 8 HOURS PRN
Qty: 15 TABLET | Refills: 0 | Status: SHIPPED | OUTPATIENT
Start: 2024-01-10

## 2024-01-10 RX ORDER — OXYCODONE HYDROCHLORIDE 5 MG/1
5 TABLET ORAL EVERY 6 HOURS PRN
Qty: 10 TABLET | Refills: 0 | Status: SHIPPED | OUTPATIENT
Start: 2024-01-10

## 2024-01-10 RX ORDER — TRAMADOL HYDROCHLORIDE 50 MG/1
50 TABLET ORAL EVERY 6 HOURS PRN
Qty: 20 TABLET | Refills: 0 | Status: SHIPPED | OUTPATIENT
Start: 2024-01-10

## 2024-01-10 RX ORDER — SENNOSIDES 8.6 MG
650 CAPSULE ORAL EVERY 6 HOURS
Qty: 20 TABLET | Refills: 0 | Status: SHIPPED | OUTPATIENT
Start: 2024-01-10 | End: 2024-01-15

## 2024-01-11 ENCOUNTER — ANESTHESIA (OUTPATIENT)
Dept: PERIOP | Facility: HOSPITAL | Age: 39
End: 2024-01-11
Payer: COMMERCIAL

## 2024-01-11 ENCOUNTER — HOSPITAL ENCOUNTER (OUTPATIENT)
Facility: HOSPITAL | Age: 39
Setting detail: OBSERVATION
Discharge: HOME | End: 2024-01-12
Attending: STUDENT IN AN ORGANIZED HEALTH CARE EDUCATION/TRAINING PROGRAM | Admitting: STUDENT IN AN ORGANIZED HEALTH CARE EDUCATION/TRAINING PROGRAM
Payer: COMMERCIAL

## 2024-01-11 LAB
EXT PREGNANCY TEST URINE: NEGATIVE
EXT. CONTROL: NORMAL

## 2024-01-11 PROCEDURE — C9290 INJ, BUPIVACAINE LIPOSOME: HCPCS | Performed by: STUDENT IN AN ORGANIZED HEALTH CARE EDUCATION/TRAINING PROGRAM

## 2024-01-11 PROCEDURE — 81025 URINE PREGNANCY TEST: CPT | Performed by: STUDENT IN AN ORGANIZED HEALTH CARE EDUCATION/TRAINING PROGRAM

## 2024-01-11 PROCEDURE — 19318 BREAST REDUCTION: CPT | Performed by: STUDENT IN AN ORGANIZED HEALTH CARE EDUCATION/TRAINING PROGRAM

## 2024-01-11 PROCEDURE — 15830 EXC EXCESSIVE SKIN ABDOMEN: CPT | Performed by: STUDENT IN AN ORGANIZED HEALTH CARE EDUCATION/TRAINING PROGRAM

## 2024-01-11 RX ORDER — MAGNESIUM HYDROXIDE 1200 MG/15ML
LIQUID ORAL AS NEEDED
Status: DISCONTINUED | OUTPATIENT
Start: 2024-01-11 | End: 2024-01-11 | Stop reason: HOSPADM

## 2024-01-11 RX ORDER — MIDAZOLAM HYDROCHLORIDE 2 MG/2ML
INJECTION, SOLUTION INTRAMUSCULAR; INTRAVENOUS AS NEEDED
Status: DISCONTINUED | OUTPATIENT
Start: 2024-01-11 | End: 2024-01-11

## 2024-01-11 RX ORDER — ONDANSETRON 2 MG/ML
INJECTION INTRAMUSCULAR; INTRAVENOUS AS NEEDED
Status: DISCONTINUED | OUTPATIENT
Start: 2024-01-11 | End: 2024-01-11

## 2024-01-11 RX ORDER — ONDANSETRON 2 MG/ML
4 INJECTION INTRAMUSCULAR; INTRAVENOUS ONCE AS NEEDED
Status: DISCONTINUED | OUTPATIENT
Start: 2024-01-11 | End: 2024-01-11 | Stop reason: HOSPADM

## 2024-01-11 RX ORDER — PHENYLEPHRINE HCL IN 0.9% NACL 1 MG/10 ML
SYRINGE (ML) INTRAVENOUS AS NEEDED
Status: DISCONTINUED | OUTPATIENT
Start: 2024-01-11 | End: 2024-01-11

## 2024-01-11 RX ORDER — FENTANYL CITRATE/PF 50 MCG/ML
50 SYRINGE (ML) INJECTION
Status: DISCONTINUED | OUTPATIENT
Start: 2024-01-11 | End: 2024-01-11 | Stop reason: HOSPADM

## 2024-01-11 RX ORDER — FENTANYL CITRATE 50 UG/ML
INJECTION, SOLUTION INTRAMUSCULAR; INTRAVENOUS AS NEEDED
Status: DISCONTINUED | OUTPATIENT
Start: 2024-01-11 | End: 2024-01-11

## 2024-01-11 RX ORDER — MINERAL OIL
OIL (ML) MISCELLANEOUS AS NEEDED
Status: DISCONTINUED | OUTPATIENT
Start: 2024-01-11 | End: 2024-01-11 | Stop reason: HOSPADM

## 2024-01-11 RX ORDER — SODIUM CHLORIDE, SODIUM LACTATE, POTASSIUM CHLORIDE, CALCIUM CHLORIDE 600; 310; 30; 20 MG/100ML; MG/100ML; MG/100ML; MG/100ML
INJECTION, SOLUTION INTRAVENOUS CONTINUOUS PRN
Status: DISCONTINUED | OUTPATIENT
Start: 2024-01-11 | End: 2024-01-11

## 2024-01-11 RX ORDER — HYDROMORPHONE HCL/PF 1 MG/ML
0.5 SYRINGE (ML) INJECTION EVERY 4 HOURS PRN
Status: DISCONTINUED | OUTPATIENT
Start: 2024-01-11 | End: 2024-01-12 | Stop reason: HOSPADM

## 2024-01-11 RX ORDER — OXYCODONE HYDROCHLORIDE 5 MG/1
5 TABLET ORAL EVERY 4 HOURS PRN
Status: DISCONTINUED | OUTPATIENT
Start: 2024-01-11 | End: 2024-01-12 | Stop reason: HOSPADM

## 2024-01-11 RX ORDER — LIDOCAINE HYDROCHLORIDE 10 MG/ML
INJECTION, SOLUTION EPIDURAL; INFILTRATION; INTRACAUDAL; PERINEURAL AS NEEDED
Status: DISCONTINUED | OUTPATIENT
Start: 2024-01-11 | End: 2024-01-11

## 2024-01-11 RX ORDER — ACETAMINOPHEN 325 MG/1
975 TABLET ORAL ONCE
Status: COMPLETED | OUTPATIENT
Start: 2024-01-11 | End: 2024-01-11

## 2024-01-11 RX ORDER — OXYCODONE HYDROCHLORIDE 5 MG/1
5 TABLET ORAL ONCE AS NEEDED
Status: DISCONTINUED | OUTPATIENT
Start: 2024-01-11 | End: 2024-01-12 | Stop reason: HOSPADM

## 2024-01-11 RX ORDER — SERTRALINE HYDROCHLORIDE 25 MG/1
25 TABLET, FILM COATED ORAL DAILY
Status: DISCONTINUED | OUTPATIENT
Start: 2024-01-12 | End: 2024-01-12 | Stop reason: HOSPADM

## 2024-01-11 RX ORDER — METOCLOPRAMIDE HYDROCHLORIDE 5 MG/ML
10 INJECTION INTRAMUSCULAR; INTRAVENOUS EVERY 6 HOURS PRN
Status: DISCONTINUED | OUTPATIENT
Start: 2024-01-11 | End: 2024-01-12 | Stop reason: HOSPADM

## 2024-01-11 RX ORDER — SODIUM CHLORIDE, SODIUM LACTATE, POTASSIUM CHLORIDE, CALCIUM CHLORIDE 600; 310; 30; 20 MG/100ML; MG/100ML; MG/100ML; MG/100ML
75 INJECTION, SOLUTION INTRAVENOUS CONTINUOUS
Status: DISCONTINUED | OUTPATIENT
Start: 2024-01-11 | End: 2024-01-12 | Stop reason: HOSPADM

## 2024-01-11 RX ORDER — GABAPENTIN 300 MG/1
300 CAPSULE ORAL 3 TIMES DAILY
Status: DISCONTINUED | OUTPATIENT
Start: 2024-01-11 | End: 2024-01-12 | Stop reason: HOSPADM

## 2024-01-11 RX ORDER — SODIUM CHLORIDE, SODIUM LACTATE, POTASSIUM CHLORIDE, CALCIUM CHLORIDE 600; 310; 30; 20 MG/100ML; MG/100ML; MG/100ML; MG/100ML
125 INJECTION, SOLUTION INTRAVENOUS CONTINUOUS
Status: DISCONTINUED | OUTPATIENT
Start: 2024-01-11 | End: 2024-01-12 | Stop reason: HOSPADM

## 2024-01-11 RX ORDER — ONDANSETRON 2 MG/ML
4 INJECTION INTRAMUSCULAR; INTRAVENOUS EVERY 6 HOURS PRN
Status: DISCONTINUED | OUTPATIENT
Start: 2024-01-11 | End: 2024-01-12 | Stop reason: HOSPADM

## 2024-01-11 RX ORDER — CEFAZOLIN SODIUM 2 G/50ML
2000 SOLUTION INTRAVENOUS ONCE
Status: COMPLETED | OUTPATIENT
Start: 2024-01-11 | End: 2024-01-11

## 2024-01-11 RX ORDER — CEFAZOLIN SODIUM 1 G/50ML
1000 SOLUTION INTRAVENOUS EVERY 8 HOURS
Status: COMPLETED | OUTPATIENT
Start: 2024-01-11 | End: 2024-01-12

## 2024-01-11 RX ORDER — ACETAMINOPHEN 325 MG/1
650 TABLET ORAL EVERY 6 HOURS
Status: DISCONTINUED | OUTPATIENT
Start: 2024-01-11 | End: 2024-01-12 | Stop reason: HOSPADM

## 2024-01-11 RX ORDER — PROPOFOL 10 MG/ML
INJECTION, EMULSION INTRAVENOUS AS NEEDED
Status: DISCONTINUED | OUTPATIENT
Start: 2024-01-11 | End: 2024-01-11

## 2024-01-11 RX ORDER — GABAPENTIN 300 MG/1
300 CAPSULE ORAL ONCE
Status: DISCONTINUED | OUTPATIENT
Start: 2024-01-11 | End: 2024-01-11 | Stop reason: HOSPADM

## 2024-01-11 RX ORDER — ENOXAPARIN SODIUM 100 MG/ML
40 INJECTION SUBCUTANEOUS ONCE
Status: COMPLETED | OUTPATIENT
Start: 2024-01-11 | End: 2024-01-11

## 2024-01-11 RX ORDER — CEFAZOLIN SODIUM 1 G/3ML
INJECTION, POWDER, FOR SOLUTION INTRAMUSCULAR; INTRAVENOUS AS NEEDED
Status: DISCONTINUED | OUTPATIENT
Start: 2024-01-11 | End: 2024-01-11

## 2024-01-11 RX ORDER — FLUOXETINE 10 MG/1
10 CAPSULE ORAL DAILY
Status: DISCONTINUED | OUTPATIENT
Start: 2024-01-12 | End: 2024-01-12 | Stop reason: HOSPADM

## 2024-01-11 RX ORDER — HYDROMORPHONE HCL/PF 1 MG/ML
0.5 SYRINGE (ML) INJECTION
Status: DISCONTINUED | OUTPATIENT
Start: 2024-01-11 | End: 2024-01-11 | Stop reason: HOSPADM

## 2024-01-11 RX ORDER — ROCURONIUM BROMIDE 10 MG/ML
INJECTION, SOLUTION INTRAVENOUS AS NEEDED
Status: DISCONTINUED | OUTPATIENT
Start: 2024-01-11 | End: 2024-01-11

## 2024-01-11 RX ORDER — HYDROMORPHONE HCL/PF 1 MG/ML
SYRINGE (ML) INJECTION AS NEEDED
Status: DISCONTINUED | OUTPATIENT
Start: 2024-01-11 | End: 2024-01-11

## 2024-01-11 RX ORDER — METOCLOPRAMIDE HYDROCHLORIDE 5 MG/ML
10 INJECTION INTRAMUSCULAR; INTRAVENOUS ONCE AS NEEDED
Status: DISCONTINUED | OUTPATIENT
Start: 2024-01-11 | End: 2024-01-11 | Stop reason: HOSPADM

## 2024-01-11 RX ORDER — DEXAMETHASONE SODIUM PHOSPHATE 10 MG/ML
INJECTION, SOLUTION INTRAMUSCULAR; INTRAVENOUS AS NEEDED
Status: DISCONTINUED | OUTPATIENT
Start: 2024-01-11 | End: 2024-01-11

## 2024-01-11 RX ADMIN — HYDROMORPHONE HYDROCHLORIDE 0.5 MG: 1 INJECTION, SOLUTION INTRAMUSCULAR; INTRAVENOUS; SUBCUTANEOUS at 08:01

## 2024-01-11 RX ADMIN — DEXAMETHASONE SODIUM PHOSPHATE 10 MG: 10 INJECTION, SOLUTION INTRAMUSCULAR; INTRAVENOUS at 07:53

## 2024-01-11 RX ADMIN — CEFAZOLIN 2000 MG: 330 INJECTION, POWDER, FOR SOLUTION INTRAMUSCULAR; INTRAVENOUS at 11:26

## 2024-01-11 RX ADMIN — HYDROMORPHONE HYDROCHLORIDE 0.5 MG: 1 INJECTION, SOLUTION INTRAMUSCULAR; INTRAVENOUS; SUBCUTANEOUS at 09:35

## 2024-01-11 RX ADMIN — CEFAZOLIN SODIUM 1000 MG: 1 SOLUTION INTRAVENOUS at 22:18

## 2024-01-11 RX ADMIN — ENOXAPARIN SODIUM 40 MG: 40 INJECTION SUBCUTANEOUS at 06:24

## 2024-01-11 RX ADMIN — SODIUM CHLORIDE, SODIUM LACTATE, POTASSIUM CHLORIDE, AND CALCIUM CHLORIDE: .6; .31; .03; .02 INJECTION, SOLUTION INTRAVENOUS at 06:27

## 2024-01-11 RX ADMIN — Medication 100 MCG: at 13:57

## 2024-01-11 RX ADMIN — PROPOFOL 100 MCG/KG/MIN: 10 INJECTION, EMULSION INTRAVENOUS at 07:40

## 2024-01-11 RX ADMIN — ONDANSETRON 4 MG: 2 INJECTION INTRAMUSCULAR; INTRAVENOUS at 19:21

## 2024-01-11 RX ADMIN — MIDAZOLAM 2 MG: 1 INJECTION INTRAMUSCULAR; INTRAVENOUS at 07:25

## 2024-01-11 RX ADMIN — SODIUM CHLORIDE, SODIUM LACTATE, POTASSIUM CHLORIDE, CALCIUM CHLORIDE: 600; 310; 30; 20 INJECTION, SOLUTION INTRAVENOUS at 09:22

## 2024-01-11 RX ADMIN — SODIUM CHLORIDE, SODIUM LACTATE, POTASSIUM CHLORIDE, CALCIUM CHLORIDE: 600; 310; 30; 20 INJECTION, SOLUTION INTRAVENOUS at 07:37

## 2024-01-11 RX ADMIN — FENTANYL CITRATE 50 MCG: 50 INJECTION, SOLUTION INTRAMUSCULAR; INTRAVENOUS at 07:33

## 2024-01-11 RX ADMIN — ROCURONIUM BROMIDE 50 MG: 10 INJECTION, SOLUTION INTRAVENOUS at 07:33

## 2024-01-11 RX ADMIN — OXYCODONE HYDROCHLORIDE 5 MG: 5 TABLET ORAL at 21:44

## 2024-01-11 RX ADMIN — GABAPENTIN 300 MG: 300 CAPSULE ORAL at 21:33

## 2024-01-11 RX ADMIN — CEFAZOLIN SODIUM 2000 MG: 2 SOLUTION INTRAVENOUS at 07:25

## 2024-01-11 RX ADMIN — ACETAMINOPHEN 975 MG: 325 TABLET ORAL at 06:19

## 2024-01-11 RX ADMIN — ACETAMINOPHEN 650 MG: 325 TABLET ORAL at 21:33

## 2024-01-11 RX ADMIN — SODIUM CHLORIDE, SODIUM LACTATE, POTASSIUM CHLORIDE, AND CALCIUM CHLORIDE 125 ML/HR: .6; .31; .03; .02 INJECTION, SOLUTION INTRAVENOUS at 19:31

## 2024-01-11 RX ADMIN — SODIUM CHLORIDE, SODIUM LACTATE, POTASSIUM CHLORIDE, CALCIUM CHLORIDE: 600; 310; 30; 20 INJECTION, SOLUTION INTRAVENOUS at 11:55

## 2024-01-11 RX ADMIN — ONDANSETRON 4 MG: 2 INJECTION INTRAMUSCULAR; INTRAVENOUS at 15:52

## 2024-01-11 RX ADMIN — FENTANYL CITRATE 100 MCG: 50 INJECTION, SOLUTION INTRAMUSCULAR; INTRAVENOUS at 13:38

## 2024-01-11 RX ADMIN — PROPOFOL 200 MG: 10 INJECTION, EMULSION INTRAVENOUS at 07:33

## 2024-01-11 RX ADMIN — SODIUM CHLORIDE, SODIUM LACTATE, POTASSIUM CHLORIDE, AND CALCIUM CHLORIDE: .6; .31; .03; .02 INJECTION, SOLUTION INTRAVENOUS at 07:50

## 2024-01-11 RX ADMIN — CEFAZOLIN 2000 MG: 330 INJECTION, POWDER, FOR SOLUTION INTRAMUSCULAR; INTRAVENOUS at 15:30

## 2024-01-11 RX ADMIN — SODIUM CHLORIDE, SODIUM LACTATE, POTASSIUM CHLORIDE, AND CALCIUM CHLORIDE 125 ML/HR: .6; .31; .03; .02 INJECTION, SOLUTION INTRAVENOUS at 06:29

## 2024-01-11 RX ADMIN — FENTANYL CITRATE 50 MCG: 50 INJECTION, SOLUTION INTRAMUSCULAR; INTRAVENOUS at 07:45

## 2024-01-11 RX ADMIN — METOCLOPRAMIDE 10 MG: 5 INJECTION, SOLUTION INTRAMUSCULAR; INTRAVENOUS at 19:26

## 2024-01-11 RX ADMIN — SODIUM CHLORIDE, SODIUM LACTATE, POTASSIUM CHLORIDE, CALCIUM CHLORIDE: 600; 310; 30; 20 INJECTION, SOLUTION INTRAVENOUS at 15:46

## 2024-01-11 RX ADMIN — FENTANYL CITRATE 50 MCG: 50 INJECTION, SOLUTION INTRAMUSCULAR; INTRAVENOUS at 17:20

## 2024-01-11 RX ADMIN — LIDOCAINE HYDROCHLORIDE 50 MG: 10 INJECTION, SOLUTION EPIDURAL; INFILTRATION; INTRACAUDAL; PERINEURAL at 07:33

## 2024-01-11 RX ADMIN — FENTANYL CITRATE 50 MCG: 50 INJECTION, SOLUTION INTRAMUSCULAR; INTRAVENOUS at 17:32

## 2024-01-11 RX ADMIN — SODIUM CHLORIDE, SODIUM LACTATE, POTASSIUM CHLORIDE, CALCIUM CHLORIDE: 600; 310; 30; 20 INJECTION, SOLUTION INTRAVENOUS at 13:42

## 2024-01-11 RX ADMIN — Medication 100 MCG: at 13:51

## 2024-01-11 RX ADMIN — SODIUM CHLORIDE, SODIUM LACTATE, POTASSIUM CHLORIDE, AND CALCIUM CHLORIDE 75 ML/HR: .6; .31; .03; .02 INJECTION, SOLUTION INTRAVENOUS at 21:34

## 2024-01-11 NOTE — DISCHARGE INSTR - AVS FIRST PAGE
Surgery Date: 1/11/2024                Patient: Priscilla Houser  Surgeon: Dr. Davidson     Postoperative Instructions   Breast Reduction/ Abdominoplasty       Dressings:  [x] Skin glue was applied to your incision over absorbable sutures.  You may feel small pieces of suture at the ends of your incision.    [x] Remove dressing the second morning following your surgery and bathe as directed.  Please leave tegaderm over the xeroform on your umbilicus and shower over it. Please leave steri strips in place.   [x] No dressings are required but you may cover the incision with gauze for comfort.  [x] Wear your surgical bra and abdominal binder at all times except while showering.  [x] Strip and record output of RUDOLPH drains daily.   [] Other instructions:     Bathing:  [x] Shower 48 hours after surgery.  Allow soap and water to gently wash over the incision.  No scrubbing.  Gently pat dry and apply dressing as needed/instructed above.  [x] No submerging incision in bathtub, pool, hot tub and/or lake.  [] Bolster dressings (yellow dressings) over your nipple areola complex MUST remain dry until seen in the office.     Activity:  [x] No heavy lifting (> 10lbs).  [x] No strenuous exercise.  [x] Walking is mandatory daily.  [x] Sleeping may be more comfortable with your head and knees elevated or in a recliner.       Diet and Medication:  [x] Resume diet as tolerated.  High protein diet is important for healing.  Remain well hydrated with water and minimize sodium intake.  [x] Resume preoperative medications.  [x] Pain medications as prescribed.  You may also begin to use ibuprofen 48 hours after surgery.  [] Finish all antibiotics as prescribed.  [x] Apply ice to area as needed for pain.  Do not place ice directly on skin.  Do not use heat.  [] Other instructions:      It is expected to have some bruising, swelling and mild oozing at the incision site and of the surrounding area.  If there is more than you expect, an enlarging area  or you suspect an infection, please call the office.     Some patients may experience a low-grade fever after surgery.  If it is above 100.4, please call the office.     If you do not have a postoperative office appointment scheduled, please call the office today and let the staff know you are to be seen in 7 days.     Please call [phone] or Dr. Davidson's cell phone ([phone]) with any questions, concerns or changes.      RUDOLPH Drain Output Log     Date Time Drain #1 Drain #2

## 2024-01-11 NOTE — ANESTHESIA POSTPROCEDURE EVALUATION
Post-Op Assessment Note    CV Status:  Stable  Pain Score: 0    Pain management: adequate       Mental Status:  Alert and awake   Hydration Status:  Euvolemic   PONV Controlled:  Controlled   Airway Patency:  Patent     Post Op Vitals Reviewed: Yes      Staff: Anesthesiologist, CRNA               BP   119/82   Temp   98.8   Pulse  91   Resp   16   SpO2   94

## 2024-01-11 NOTE — INTERVAL H&P NOTE
H&P reviewed. After examining the patient I find no changes in the patients condition since the H&P had been written.    Vitals:    01/11/24 0612   BP: 117/78   Pulse: 92   Resp: 18   Temp: (!) 96.3 °F (35.7 °C)   SpO2: 97%

## 2024-01-11 NOTE — ANESTHESIA PREPROCEDURE EVALUATION
Procedure:  BILATERAL BREAST REDUCTION (Bilateral: Breast)  ABDOMINOPLASTY (Abdomen)    Relevant Problems   CARDIO   (+) Mixed hyperlipidemia      ENDO   (+) Hypothyroidism       Latest Reference Range & Units 01/11/24 06:15   PREGNANCY TEST URINE Negative  Negative       Physical Exam    Airway       Dental       Cardiovascular      Pulmonary      Other Findings  post-pubertal.      Anesthesia Plan  ASA Score- 2     Anesthesia Type- general with ASA Monitors.         Additional Monitors:     Airway Plan: ETT.           Plan Factors-Exercise tolerance (METS): >4 METS.    Chart reviewed.   Existing labs reviewed. Patient summary reviewed.    Patient is not a current smoker.  Patient did not smoke on day of surgery.            Induction- intravenous.    Postoperative Plan- Plan for postoperative opioid use.     Informed Consent- Anesthetic plan and risks discussed with patient.  I personally reviewed this patient with the CRNA. Discussed and agreed on the Anesthesia Plan with the CRNA..

## 2024-01-11 NOTE — OP NOTE
OPERATIVE REPORT  PATIENT NAME: Priscilla Houser    :  1985  MRN: [id number]  Pt Location:  OR ROOM 12    SURGERY DATE: 2024    Surgeons and Role:  Ayaka Davidson, DO - Primary  Darcy Joshi PA-C    Preop Diagnosis:  Macromastia [N62]  Encounter for cosmetic procedure [Z41.1]    Post-Op Diagnosis Codes:     * Macromastia [N62]     * Encounter for cosmetic procedure [Z41.1]    Procedure(s):  Bilateral - BILATERAL BREAST REDUCTION  ABDOMINOPLASTY    Specimen(s):  * No specimens in log *    Estimated Blood Loss:   100 ml    Drains:  15 Fr RUDOLPH x 2 on right and left abdomen    Anesthesia Type:   General/TIVA    Operative Indications:  39 yo female present for revision breast reduction and abdominoplasty.  She has provided a no-path waiver however if any abnormalities are noted, I will send the tissue for path.    Operative Findings:  603 g reduction on the right   485 g reduction on the left   3L of lipoaspirate  Plication with large lipoabdominoplasty with 1.865 kg skin flap     Complications:   None    Procedure and Technique:  The patient was seen preoperatively.  The procedure, risks, benefits and alternatives were discussed.  Informed consent was obtained.  The patient was site marked preoperatively.  The patient was brought to the operating room where she was positioned supine with all of her pressure points appropriately padded.  Anesthesia commenced.  She was then turned prone with all of her pressure points padded.  A timeout was performed and verified.     The patient was prepped and draped in usual sterile fashion.  I infiltrated tumescent solution to her back and flanks.  #3 and #4 Kelsi microaire cannulas were used to perform suction assisted lipectomy.  Once symmetry was noted by pinch test, the incisions were closed with a single 4-0 PDS suture.      The patient was then turned supine and again assured her pressure points were appropriately padded.  I turned my attention to the  left breast. The wise pattern mastopexy markings were incised as well as a 42 mm areola cookie cutter markings.  I de-epithelialized the inferior pedicle which measured as well as the laterally extending alva flap.  This was then dissected down to pectoralis fascia.  A pec block was performed.  The dissection was carried superiorly along the pectoralis fascia to allow for mobilization of the pedicle more superiorly.  The medial and lateral pillars were contoured.  The area was copiously irrigated and hemostasis was obtained.  The pedicle was positioned and the alva flap was rotated superomedially and tacked to the fascia using 2-0 PDS.  The shape and closure was tailor tacked .  Next, the same procedure was performed on the right side.  The patient was sat up to assume symmetry between the right and left side and the site of the nipple areolar complex was measured and marked on each side 6 cm from the inframamary fold.  Next, closure commenced using 2-0 PDS for the fascia, 3-0 monocryl for the deep dermis and 4-0 PDS for the subcuticular stitch.  A 2-0 prolene was placed at the T point.  The site for the NAC was incised and de-epithelialized.  A cruciate incision was made.  The underlying NAC was brought to the surface without undue tension and was secured in placed using 3-0 monocryl and 5-0 monocryl.  The nipple areola complexes appeared healthy and well perfused.       I next turned my attention to the abdominoplasty portion of the procedure.  I remeasured and confirmed my markings.  The umbilicus was incised and electrocautery was used to dissect down along the stalk to the fascia.  The inferior transverse incision was made sharply and carried down to rectus fascia using electrocatuery.  The dissection was then carried superiorly to the xiphoid and subcostal margins.  The defect was copious irrigated and hemostasis was obtained.    The diastasis was marked and plicated in a buried figure of eight layer  followed by a running layer using 0 PDS.  A TAP block was performed.   Next, the patient was placed in the beach chair position and placement of progressive tension suture commenced using 2-0 PDS. The flap was measured to ensure tension free closure.  The superior flap incision was marked and made sharply followed by electrocautery to complete the dissection. The position of the umbilicus was marked and incised.  It was inset to the fascia and the abdominal flap using 3-0 PDS at the 12 and 6 o'clock positions.  Anthony's layer was closed using 2-0 PDS.  2, 15 Fr RUDOLPH drains were placed through the right and left aspects of the incision and secured in place using 2-0 nylon.  The deep dermis was closed using 3-0 monocryl and the subcuticular layer was run using 4-0 PDS.  The remainder of the umbilicus was inset using 3-0 monocryl along the deep dermis and running 4-0 PDS circumferentially.  The area was cleansed, dried and glue was applied to the transverse incision.  The umbilicus was cannulated using xeroform.  A bra and binder were applied.     The instrument, sponge and needle count was correct an verified prior to completion of the case.     The patient emerged from anesthesia and was transferred to the recovery room in stable condition.    Patient Disposition:  PACU         SIGNATURE: Ayaka Davidson DO  DATE: January 11, 2024  TIME: 9:10 PM

## 2024-01-12 ENCOUNTER — TRANSITIONAL CARE MANAGEMENT (OUTPATIENT)
Dept: FAMILY MEDICINE CLINIC | Facility: CLINIC | Age: 39
End: 2024-01-12

## 2024-01-12 VITALS
DIASTOLIC BLOOD PRESSURE: 65 MMHG | WEIGHT: 210 LBS | HEART RATE: 75 BPM | BODY MASS INDEX: 31.1 KG/M2 | SYSTOLIC BLOOD PRESSURE: 102 MMHG | TEMPERATURE: 97 F | RESPIRATION RATE: 18 BRPM | OXYGEN SATURATION: 94 % | HEIGHT: 69 IN

## 2024-01-12 PROBLEM — N62 MACROMASTIA: Status: ACTIVE | Noted: 2024-01-12

## 2024-01-12 PROCEDURE — G0379 DIRECT REFER HOSPITAL OBSERV: HCPCS

## 2024-01-12 PROCEDURE — 99024 POSTOP FOLLOW-UP VISIT: CPT | Performed by: STUDENT IN AN ORGANIZED HEALTH CARE EDUCATION/TRAINING PROGRAM

## 2024-01-12 RX ADMIN — LEVOTHYROXINE SODIUM 137 MCG: 25 TABLET ORAL at 05:43

## 2024-01-12 RX ADMIN — CEFAZOLIN SODIUM 1000 MG: 1 SOLUTION INTRAVENOUS at 05:43

## 2024-01-12 RX ADMIN — FLUOXETINE 10 MG: 10 CAPSULE ORAL at 08:33

## 2024-01-12 RX ADMIN — OXYCODONE HYDROCHLORIDE 5 MG: 5 TABLET ORAL at 06:04

## 2024-01-12 RX ADMIN — GABAPENTIN 300 MG: 300 CAPSULE ORAL at 08:32

## 2024-01-12 RX ADMIN — ACETAMINOPHEN 650 MG: 325 TABLET ORAL at 02:43

## 2024-01-12 RX ADMIN — ACETAMINOPHEN 650 MG: 325 TABLET ORAL at 08:33

## 2024-01-12 NOTE — PLAN OF CARE
Problem: Prexisting or High Potential for Compromised Skin Integrity  Goal: Skin integrity is maintained or improved  Description: INTERVENTIONS:  - Identify patients at risk for skin breakdown  - Assess and monitor skin integrity  - Assess and monitor nutrition and hydration status  - Monitor labs   - Assess for incontinence   - Turn and reposition patient  - Assist with mobility/ambulation  - Relieve pressure over bony prominences  - Avoid friction and shearing  - Provide appropriate hygiene as needed including keeping skin clean and dry  - Evaluate need for skin moisturizer/barrier cream  - Collaborate with interdisciplinary team   - Patient/family teaching  - Consider wound care consult   Outcome: Progressing     Problem: PAIN - ADULT  Goal: Verbalizes/displays adequate comfort level or baseline comfort level  Description: Interventions:  - Encourage patient to monitor pain and request assistance  - Assess pain using appropriate pain scale  - Administer analgesics based on type and severity of pain and evaluate response  - Implement non-pharmacological measures as appropriate and evaluate response  - Consider cultural and social influences on pain and pain management  - Notify physician/advanced practitioner if interventions unsuccessful or patient reports new pain  Outcome: Progressing     Problem: INFECTION - ADULT  Goal: Absence or prevention of progression during hospitalization  Description: INTERVENTIONS:  - Assess and monitor for signs and symptoms of infection  - Monitor lab/diagnostic results  - Monitor all insertion sites, i.e. indwelling lines, tubes, and drains  - Monitor endotracheal if appropriate and nasal secretions for changes in amount and color  - [place] appropriate cooling/warming therapies per order  - Administer medications as ordered  - Instruct and encourage patient and family to use good hand hygiene technique  - Identify and instruct in appropriate isolation precautions for  identified infection/condition  Outcome: Progressing  Goal: Absence of fever/infection during neutropenic period  Description: INTERVENTIONS:  - Monitor WBC    Outcome: Progressing     Problem: SAFETY ADULT  Goal: Patient will remain free of falls  Description: INTERVENTIONS:  - Educate patient/family on patient safety including physical limitations  - Instruct patient to call for assistance with activity   - Consult OT/PT to assist with strengthening/mobility   - Keep Call bell within reach  - Keep bed low and locked with side rails adjusted as appropriate  - Keep care items and personal belongings within reach  - Initiate and maintain comfort rounds  - Make Fall Risk Sign visible to staff  - Offer Toileting every 2 Hours, in advance of need  - Apply yellow socks and bracelet for high fall risk patients  - Consider moving patient to room near nurses station  Outcome: Progressing  Goal: Maintain or return to baseline ADL function  Description: INTERVENTIONS:  -  Assess patient's ability to carry out ADLs; assess patient's baseline for ADL function and identify physical deficits which impact ability to perform ADLs (bathing, care of mouth/teeth, toileting, grooming, dressing, etc.)  - Assess/evaluate cause of self-care deficits   - Assess range of motion  - Assess patient's mobility; develop plan if impaired  - Assess patient's need for assistive devices and provide as appropriate  - Encourage maximum independence but intervene and supervise when necessary  - Involve family in performance of ADLs  - Assess for home care needs following discharge   - Consider OT consult to assist with ADL evaluation and planning for discharge  - Provide patient education as appropriate  Outcome: Progressing     Problem: DISCHARGE PLANNING  Goal: Discharge to home or other facility with appropriate resources  Description: INTERVENTIONS:  - Identify barriers to discharge w/patient and caregiver  - Arrange for needed discharge resources  and transportation as appropriate  - Identify discharge learning needs (meds, wound care, etc.)  - Arrange for interpretive services to assist at discharge as needed  - Refer to Case Management Department for coordinating discharge planning if the patient needs post-hospital services based on physician/advanced practitioner order or complex needs related to functional status, cognitive ability, or social support system  Outcome: Progressing     Problem: Knowledge Deficit  Goal: Patient/family/caregiver demonstrates understanding of disease process, treatment plan, medications, and discharge instructions  Description: Complete learning assessment and assess knowledge base.  Interventions:  - Provide teaching at level of understanding  - Provide teaching via preferred learning methods  Outcome: Progressing     Problem: SKIN/TISSUE INTEGRITY - ADULT  Goal: Incision(s), wounds(s) or drain site(s) healing without S/S of infection  Description: INTERVENTIONS  - Assess and document dressing, incision, wound bed, drain sites and surrounding tissue  - Provide patient and family education  - Perform skin care/dressing changes every shift  Outcome: Progressing

## 2024-01-12 NOTE — NURSING NOTE
PT was D/C to home D/C instruction was given to PT and PT verbalized understanding of d/C instruction.  All personal belonging was given to PT IV was D/C no distress noted.  7 T staff accompany PT to lobby.

## 2024-01-12 NOTE — NURSING NOTE
Pt is awake,alert,tolerated clear liquids, medicated for nausea, feeling light headed and does not feel well enough to get OOB. Asking to be admitted over night. Having 9/10 operative pain, pain medication not given at this time due to pt's dizziness. Pt was fine with not taking analgesic at this time.Vital signs have been stable. at bedside. IV infusing. Report given to Nelda Marcial RN. Pt transferred via litter to room 712. Orders placed by Dr Davidson.

## 2024-01-12 NOTE — PROGRESS NOTES
Patient with nausea and dizziness in the immediate postop period and opted for observation overnight.    Must improved now.    Breast, abdomen, flanks with anticipated ecchymosis.  No s/s of hematoma.  Steristrips in place and c/d/I.  NACs well perfused.    D/c home today.    Ayaka Davidson, DO  Plastic and Reconstructive Surgery

## 2024-01-12 NOTE — PLAN OF CARE
Problem: PAIN - ADULT  Goal: Verbalizes/displays adequate comfort level or baseline comfort level  Description: Interventions:  - Encourage patient to monitor pain and request assistance  - Assess pain using appropriate pain scale  - Administer analgesics based on type and severity of pain and evaluate response  - Implement non-pharmacological measures as appropriate and evaluate response  - Consider cultural and social influences on pain and pain management  - Notify physician/advanced practitioner if interventions unsuccessful or patient reports new pain  Outcome: Progressing     Problem: SKIN/TISSUE INTEGRITY - ADULT  Goal: Incision(s), wounds(s) or drain site(s) healing without S/S of infection  Description: INTERVENTIONS  - Assess and document dressing, incision, wound bed, drain sites and surrounding tissue  - Provide patient and family education  - Perform skin care/dressing changes every   Outcome: Progressing

## 2024-01-18 ENCOUNTER — OFFICE VISIT (OUTPATIENT)
Dept: PLASTIC SURGERY | Facility: CLINIC | Age: 39
End: 2024-01-18

## 2024-01-18 DIAGNOSIS — Z98.890 S/P COSMETIC PLASTIC SURGERY: Primary | ICD-10-CM

## 2024-01-18 PROCEDURE — RECHECK: Performed by: PHYSICIAN ASSISTANT

## 2024-01-18 NOTE — PROGRESS NOTES
Assessment/Plan:     Patient is a 38 status post bilateral breast reduction and abdominoplasty by Dr. Davidson on 1/11/24. Please see HPI.    Patient returns to the office today for a first postoperative visit and drain check.  Bilateral drains with greater than 40 cc of output daily.     Patient will continue to track and record drain output and wear her supportive binder at all times.  She will place an earplug in her umbilicus for improved contour. She will return to the office in 1 week for a drain check or sooner with any questions or concerns.      Diagnoses and all orders for this visit:    S/P cosmetic plastic surgery          Subjective:     Patient ID: Priscilla Houser is a 38 y.o. female.    HPI    Patient reports expected postoperative discomfort. No longer utilizing prescription pain medication.     Review of Systems    See HPI     Objective:     Physical Exam      All incisions are clean, dry and intact. Drains are serosanguinous bilaterally.

## 2024-01-23 ENCOUNTER — OFFICE VISIT (OUTPATIENT)
Dept: PLASTIC SURGERY | Facility: CLINIC | Age: 39
End: 2024-01-23

## 2024-01-23 DIAGNOSIS — N64.89 PENDULOUS BREAST: Primary | ICD-10-CM

## 2024-01-23 PROCEDURE — 99024 POSTOP FOLLOW-UP VISIT: CPT | Performed by: STUDENT IN AN ORGANIZED HEALTH CARE EDUCATION/TRAINING PROGRAM

## 2024-01-23 RX ORDER — CEPHALEXIN 500 MG/1
500 CAPSULE ORAL EVERY 12 HOURS SCHEDULED
Qty: 14 CAPSULE | Refills: 0 | Status: SHIPPED | OUTPATIENT
Start: 2024-01-23 | End: 2024-01-30

## 2024-01-26 NOTE — PROGRESS NOTES
Patient seen and examined.  Drains are out.  Scant amount of fibrinous debris on the right former drain site.  Has not been performing massage.    Suture ends snipped.  Incisions c/d/I.  Small amount of fibrinous debris on the former right drain site.  No s/s of seroma.  Expectant edema.   Umbilicus edematous but well perfused.    Demonstrated and encouraged scar massage and foam rolling.  Encouraged moisturization.  Begin umbilical cannulation.  Continue compression.  RTC in 2 weeks or sooner should any issues arise.    Ayaka Davidson, DO  Plastic and Reconstructive Surgery

## 2024-01-27 DIAGNOSIS — L70.0 ACNE VULGARIS: ICD-10-CM

## 2024-01-29 DIAGNOSIS — L70.0 ACNE VULGARIS: ICD-10-CM

## 2024-01-29 RX ORDER — SPIRONOLACTONE 25 MG/1
50 TABLET ORAL DAILY
Qty: 60 TABLET | Refills: 1 | Status: SHIPPED | OUTPATIENT
Start: 2024-01-29 | End: 2024-01-29

## 2024-01-29 RX ORDER — SPIRONOLACTONE 25 MG/1
50 TABLET ORAL DAILY
Qty: 60 TABLET | Refills: 1 | Status: SHIPPED | OUTPATIENT
Start: 2024-01-29

## 2024-02-01 NOTE — PROGRESS NOTES
[facility]  [facility], [address]  [address]  [place] [id number] [phone]      Time Session Began: 4:00    Time Session Ended: 4:45    Due to COVID-19 precautions, this visit was performed via live interactive two-way Video Visit with patient's verbal consent.   Clinician Location:Home  Patient Location:  his truck   Verified patient identity:  [x] Yes    Session Type:Therapy 38-52 minutes (05311)      Others Present: no     Intervention: Assessment, Behavioral, Supportive, Systemic      Suicide and Violence Risk Assessment:  Suicide/Homicide/Violence Ideation: No    SI/HI intent or plan:  Denies SI, intent or plan at present.  Denies HI, intent or plan at present.     Self-injurious behavior, violence or impulsivity:  Denies all     Protective Factors/Strengths: no plan or intent.      Current Outpatient Medications   Medication Sig   • allopurinol (ZYLOPRIM) 100 MG tablet Take 1 tablet by mouth daily.   • lipase-protease-amylase 24,000-76,000-120,000 units (Creon) 25829 units per capsule Take 3 capsules by mouth in the morning and 3 capsules at noon and 3 capsules in the evening. Take with meals.   • nalTREXone (REVIA) 50 MG tablet Take 1 tablet by mouth daily.   • busPIRone (BUSPAR) 5 MG tablet Take 1 tablet by mouth in the morning and 1 tablet in the evening.   • linaclotide (LINZESS) 145 MCG capsule Take 1 capsule by mouth daily (before breakfast). Do not start before June 2, 2023.   • pantoprazole (PROTONIX) 40 MG tablet Take 1 tablet by mouth in the morning and 1 tablet in the evening. Take before meals.   • hydrOXYzine (ATARAX) 25 MG tablet Take 1 tablet by mouth 2 times daily as needed for Anxiety (and sleep disturbances).   • colchicine (COLCRYS) 0.6 MG tablet Take 2 tablets by mouth immediately, THEN take 1 tablet by mouth 1 hour later. (Patient taking differently: Take 2 tablets by mouth immediately, THEN take 1 tablet by mouth 1 hour later.  [facility] Follow Up Note    Patient Name: Rick Colbert  Encounter Date: 01/17/2022    Today's Chief Concerns:  [facility] Concern #1:  Follow up skin exam       Current Medications:    Current Outpatient Medications:     cholecalciferol (VITAMIN D3) 25 mcg (1,000 units) tablet, Take 4,000 units daily  , Disp: , Rfl:     sertraline (ZOLOFT) 25 mg tablet, Take 1 tablet (25 mg total) by mouth daily, Disp: 90 tablet, Rfl: 2    spironolactone (ALDACTONE) 50 mg tablet, Take 1 tablet daily, Disp: , Rfl:     Synthroid 137 MCG tablet, Take 1 tablet (137 mcg total) by mouth daily, Disp: 90 tablet, Rfl: 1    tretinoin (RETIN-A) 0 025 % cream, Apply topically daily at bedtime Spread one pea-sized amount of medication over entire face about one hour before bedtime  , Disp: 45 g, Rfl: 4    CONSTITUTIONAL:   There were no vitals filed for this visit  Specific Alerts:    Have you been seen by a [facility]atologist in the last 3 years? YES    Are you pregnant or planning to become pregnant? No    Are you currently or planning to be nursing or breast feeding? No    No Known Allergies    May we call your Preferred Phone number to discuss your specific medical information? YES    May we leave a detailed message that includes your specific medical information? YES        Do you currently have a pacemaker or defibrillator? No    Do you have any artificial heart valves, joints, plates, screws, rods, stents, pins, etc? No   - If Yes, were any placed within the last 2 years? Do you require any medications prior to a surgical procedure? No       Are you taking any medications that cause you to bleed more easily ("blood thinners") No    Have you ever experienced a rapid heartbeat with epinephrine? Yes     Have you ever been treated with "gold" (gold sodium thiomalate) therapy?  No    [facility] can help with wrinkles, "laugh lines," facial volume loss, "double chin," "love handles," age spots, and PRN for Gout)   • TART CHERRY PO Take 1 tablet by mouth nightly.   • Probiotic Product (PROBIOTIC-10 PO) Take 1 tablet by mouth nightly.     No current facility-administered medications for this visit.     Change in Medication(s) Reported: No  If Yes, explain:   Patient/Family Education Provided: Yes  Patient/Family Displays Understanding: Yes    If No, explain:     Chief complaint in patient's own words: \"I have a really low opinion of myself. I would like to change that.\"     Progress Note containing chief complaint and symptoms/problems related to the complaint:    (Data/Action/Response/Plan)    D:   Nabil reports continued abstinence since 01/06/2024. He has been taking his medications as prescribed by his primary care provider: Revia, and Buspar.   Nabil has not had strong urges to drink, however feels the Naltrexone may not address his urges as fully as he'd prefer. He has felt more depressed ad asked writer about Ketamine, as well as other treatments for depression. \"My wife has been saying she thinks I'm depressed.\"     His sleep has been averaging 6 hours a night, but has been broken, with him awakening every few hours.     A:   Writer administered the GAD7 and PHQ9: Nabil scored with moderately severe anxiety and depression.     GAD7 = 12   PHQ9 = 14     We updated his treatment plan and will focus on his relationship with alcohol, as well as his self esteem and confidence.   Writer encouraged Nabil to review the psychoeducation regarding Family Roles. We will review next session.     R:   Nabil's mood was somewhat anxious. He expressed a wish to feel better about himself, and that feeling badly about himself is familiar.     \"I was the butt of every joke. My brother; his friends; they were all older than me.\"   \"I grew up thinking I was the butt of a joke. And I still feel that way to a certain extent. I am the scapegoat.\"   \"It infiltrates my thinking deeper than I think it does.\"     Regarding his recovery,  more  Are you interested in learning today about some of the skin enhancement procedures that we offer? (If Yes, please provide more detail) No    Review of Systems:  Have you recently had or currently have any of the following?     · Fever or chills: No  · Night Sweats: No  · Headaches: No  · Weight Gain: No  · Weight Loss: No  · Blurry Vision: No  · Nausea: No  · Vomiting: No  · Diarrhea: No  · Blood in Stool: No  · Abdominal Pain: No  · Itchy Skin: No  · Painful Joints: No  · Swollen Joints: No  · Muscle Pain: No  · Irregular Mole: No  · Sun Burn: No  · Dry Skin: No  · Skin Color Changes: No  · Scar or Keloid: No  · Cold Sores/Fever Blisters: No  · Bacterial Infections/MRSA: No  · Anxiety: No  · Depression: No  · Suicidal or Homicidal Thoughts: No      PSYCH: Normal mood and affect  EYES: Normal conjunctiva  ENT: Normal lips and oral mucosa  CARDIOVASCULAR: No edema  RESPIRATORY: Normal respirations  HEME/LYMPH/IMMUNO:  No regional lymphadenopathy except as noted below in ASSESSMENT AND PLAN BY DIAGNOSIS    FULL ORGAN SYSTEM SKIN EXAM (SKIN)   Hair, Scalp, Ears, Face Normal except as noted below in Assessment   Neck, Cervical Chain Nodes Normal except as noted below in Assessment   Right Arm/Hand/Fingers Normal except as noted below in Assessment   Left Arm/Hand/Fingers Normal except as noted below in Assessment   Chest/Breasts/Axillae Viewed areas Normal except as noted below in Assessment   Abdomen, Umbilicus Normal except as noted below in Assessment   Back/Spine Normal except as noted below in Assessment   Groin/Genitalia/Buttocks Viewed areas Normal except as noted below in Assessment   Right Leg, Foot, Toes Normal except as noted below in Assessment   Left Leg, Foot, Toes Normal except as noted below in Assessment     LENTIGO    Physical Exam:   Anatomic Location Affected:  Trunk, upper and lower extremities, face    Morphological Description:  Several tan brown macules    Pertinent Positives:   Pertinent Negatives: Additional History of Present Condition:  Sun exposure over the years     Assessment and Plan:  Based on a thorough discussion of this condition and the management approach to it (including a comprehensive discussion of the known risks, side effects and potential benefits of treatment), the patient (family) agrees to implement the following specific plan:   Monitor for changes    Continue Hydroquinone cream (through skin medicinals) as needed for dark spots   Continue tretinoin cream as needed to face     What is a lentigo? A lentigo is a pigmented flat or slightly raised lesion with a clearly defined edge  Unlike an ephelis (freckle), it does not fade in the winter months  There are several kinds of lentigo  The name lentigo originally referred to its appearance resembling a small lentil  The plural of lentigo is lentigines, although lentigos is also in common use  Who gets lentigines? Lentigines can affect males and females of all ages and races  Solar lentigines are especially prevalent in fair skinned adults  Lentigines associated with syndromes are present at birth or arise during childhood  What causes lentigines? Common forms of lentigo are due to exposure to ultraviolet radiation:   Sun damage including sunburn    Indoor tanning    Phototherapy, especially photochemotherapy (PUVA)    Ionizing radiation, eg radiation therapy, can also cause lentigines  Several familial syndromes associated with widespread lentigines originate from mutations in Lamin-MAP kinase, mTOR signaling and PTEN pathways  What are the clinical features of lentigines? Lentigines have been classified into several different types depending on what they look like, where they appear on the body, causative factors, and whether they are associated to other diseases or conditions  Lentigines may be solitary or more often, multiple   Most lentigines are smaller than 5 mm in he described feeling \"invigorated to stay clean.\" \"I could lose my wife and my job/career.\" He is in the early stages of the action stage of change. \"The urges are strong but I'm dealing with them right now.\"     P: Will reach out to Dr. Limon regarding getting Nabil on her schedule. She is familiar with him from [facility].     Need for Community Resources Assessed: Yes    Resources Needed: No    If Yes, what resources:     Primary Diagnosis:  Alcohol use disorder, severe, in early remission (CMD)  (primary encounter diagnosis)  Generalized anxiety disorder  Current moderate episode of major depressive disorder, unspecified whether recurrent (CMD)    : 1 Continuous    Treatment Plan: Treatment plan established this visit    Discharge Plan: Strategies Discussed to Maintain Gains    Next Appointment: 02/15/2024      Jelena Ahmadi, PACO, CSAC, ICS          diameter      Lentigo simplex   A precursor to junctional naevus    Arises during childhood and early adult life    Found on trunk and limbs    Small brown round or oval macule or thin plaque    Jagged or smooth edge    May have a dry surface    May disappear in time  Solar lentigo   A precursor to seborrhoeic keratosis    Found on chronically sun exposed sites such as hands, face, lower legs    May also follow sunburn to shoulders    Yellow, light or dark brown regular or irregular macule or thin plaque    May have a dry surface    Often has moth-eaten outline    Can slowly enlarge to several centimeters in diameter    May disappear, often through the process known as lichenoid keratosis    When atypical in appearance, may be difficult to distinguish from melanoma in situ  Ink spot lentigo   Also known as reticulated lentigo    Few in number compared to solar lentigines    Follows sunburn in very fair skinned individuals    Dark brown to black irregular ink spot-like macule  PUVA lentigo   Similar to ink spot lentigo but follows photochemotherapy (PUVA)    Location anywhere exposed to PUVA  Tanning bed lentigo   Similar to ink spot lentigo but follows indoor tanning    Location anywhere exposed to tanning bed  Radiation lentigo   Occurs in site of irradiation (accidental or therapeutic)    Associated with late-stage radiation dermatitis: epidermal atrophy, subcutaneous fibrosis, keratosis, telangiectasias  Melanotic macule   Mucosal surfaces or adjacent glabrous skin eg lip, vulva, penis, anus    Light to dark brown    Also called mucosal melanosis  Generalised lentigines   Found on any exposed or covered site from early childhood    Small macules may merge to form larger patches    Not associated with a syndrome    Also called lentigines profusa, multiple lentigines  Agminated lentigines   Naevoid eruption of lentigos confined to a single segmental area    Sharp demarcation in midline    May have associated neurological and developmental abnormalities  Patterned lentigines   Inherited tendency to lentigines on face, lips, buttocks, palms, soles    Recognised mainly in people of  ethnicity  Centrofacial neurodysraphic lentiginosis  Associated with mental retardation  Lentiginosis syndromes   Syndromes include LEOPARD/Yamile, Peutz-Jeghers, Laugier-Hunziker, Moynahan, Xeroderma pigmentosum, myxoma syndromes (HART, NAME, Jones), Ruvalcaba-Myhre-David, Bannayan-Zonnana syndrome, Cowden disease (multiple hamartoma syndrome )    Inheritance is autosomal dominant; sporadic cases common    Widespread lentigines present at birth or arise in early childhood    Associated with neural, endocrine, and mesenchymal tumors    How is the diagnosis made? Lentigines are usually diagnosed clinically by their typical appearance  Concern regarding possibility of melanoma may lead to:   Dermatoscopy    Diagnostic excision biopsy    Histopathology of a lentigo shows:   Thickened epidermis    An increased number of melanocytes along the basal layer of epidermis    Unlike junctional melanocytic naevus, there are no nests of melanocytes    Increased melanin pigment within the keratinocytes    Additional features depending on type of lentigo    In contrast, an ephelis (freckle) shows sun-induced increased melanin within the keratinocytes, without an increase in number of cells  What is the treatment for lentigines? Most lentigines are left alone  Attempts to lighten them may not be successful  The following approaches are used:   SPF 50+ broad-spectrum sunscreen    Hydroquinone bleaching cream    Alpha hydroxy acids    Vitamin C    Retinoids    Azelaic acid    Chemical peels  Individual lesions can be permanently removed using:   Cryotherapy    Intense pulsed light    Pigment lasers    How can lentigines be prevented?   Lentigines associated with exposure ultraviolet radiation can be prevented by very careful sun protection  Clothing is more successful at preventing new lentigines than are sunscreens  What is the outlook for lentigines? Lentigines usually persist  They may increase in number with age and sun exposure  Some in sun-protected sites may fade and disappear  MELANOCYTIC NEVI ("Moles")    Physical Exam:   Anatomic Location Affected:   Mostly on sun-exposed areas of the Trunk, upper and lower extremities    Morphological Description:  Scattered, 1-4mm round to ovoid, symmetrical-appearing, even bordered, skin colored to dark brown macules/papules, mostly in sun-exposed areas   Pertinent Positives:   Pertinent Negatives: Additional History of Present Condition:  Sun exposure over the years     Assessment and Plan:  Based on a thorough discussion of this condition and the management approach to it (including a comprehensive discussion of the known risks, side effects and potential benefits of treatment), the patient (family) agrees to implement the following specific plan:   Monitor for changes   When outside we recommend using a wide brim hat, sunglasses, long sleeve and pants, sunscreen with SPF 00+ with reapplication every 2 hours, or SPF specific clothing    Skin exam every 2 years recommended      Melanocytic Nevi  Melanocytic nevi ("moles") are caused by collections of the color producing skin cells, or melanocytes, in 1 area in the skin  They can range in color from pink to dark brown and be either raised or flat  Some moles are present at birth (I e , "congenital nevi"), while others come up later in life (i e , "acquired nevi")  Jenetta Smith exposure also stimulates the body to make more moles, ie the more sun you get the more moles you'll grow  Clinically distinguishing a healthy mole from melanoma may be difficult   The "ABCDE's" of moles have been suggested as a means of helping to alert a person to a suspicious mole and the possible increased risk of melanoma  Asymmetry: Healthy moles tend to be symmetric, while melanomas are often asymmetric  Asymmetry means if you draw a line through the mole, the two halves do not match in color, size, shape, or surface texture Any mole that starts to demonstrate "asymmetry" should be examined promptly by a board certified dermatologist      Border: Healthy moles tend to have discrete, even borders  The border of a melanoma often blends into the normal skin and does not sharply delineate the mole from normal skin  Any mole that starts to demonstrate "uneven borders" should be examined promptly     Color: Healthy moles tend to be one color throughout  Melanomas tend to be made up of different colors ranging from dark black, blue, white, or red  Any mole that demonstrates a color change should be examined promptly    Diameter: Healthy moles tend to be smaller than 0 6 cm in size; an exception are "congenital nevi" that can be larger  Melanomas tend to grow and can often be greater than 0 6 cm (1/4 of an inch, or the size of a pencil eraser)  This is only a guideline, and many normal moles may be larger than 0 6 cm without being unhealthy  Any mole that starts to change in size (small to bigger or bigger to smaller) should be examined promptly    Evolving: Healthy moles tend to "stay the same "  Melanomas may often show signs of change or evolution such as a change in size, shape, color, or elevation  Any mole that starts to itch, bleed, crust, burn, hurt, or ulcerate or demonstrate a change or evolution should be examined promptly by a board certified dermatologist       What are atypical moles or dysplastic nevi? Dysplastic moles are moles that have some of the ABCDE  changes listed above but  are not cancerous  Sometimes a biopsy and microscopic examination are needed to determine the difference   They may indicate an increased risk of melanoma in that person, especially if there is a family history of melanoma  What is a Melanoma? The main concern when looking at a new or changing mole it to evaluate whether it may be a melanoma  The appearance of a "new mole" remains one of the most reliable methods for identifying a malignant melanoma  A melanoma is a type of skin cancer that can be deadly if it spreads throughout the body  The prognosis of a Melanoma depends on how deep it has penetrated in the skin  If caught early, they generally will not have had time to grow into the deeper layers of the skin and they cure rate is then very high  Once the melanoma grows deeper into the skin, the cure rate drops dramatically  Therefore, early detection and removal of a malignant melanoma results in a much better chance of complete cure  LIPOMA    Physical Exam:   Anatomic Location Affected:  Back    Morphological Description:  1 cm movable nodule    Pertinent Positives:   Pertinent Negatives: Additional History of Present Condition:  Present for years     Assessment and Plan:  Based on a thorough discussion of this condition and the management approach to it (including a comprehensive discussion of the known risks, side effects and potential benefits of treatment), the patient (family) agrees to implement the following specific plan:   Reassured benign    Excision discussed if becomes bothersome   Injection discussed to dissolve contents (through plastics)     Lipoma  A lipoma is a non-cancerous tumor that is made up of fat cells  It slowly grows under the skin in the subcutaneous tissue  A person may have a single lipoma or may have many lipomas  They are very common  Lipomas can occur in people of all ages, however, they tend to develop in adulthood and are most noticeable during middle age  They affect both sexes equally, although solitary lipomas are more common in women whilst multiple lipomas occur more frequently in men  The cause of lipomas is unknown   It is possible there may be genetic involvement as many patients with lipomas come from a family with a history of these tumors  Sometimes an injury such as a blunt blow to part of the body may trigger growth of a lipoma  People are often unaware of lipomas until they have grown large enough to become visible and palpable  This growth occurs slowly over several years  Some features of lipomas include:   A dome-shaped or egg-shaped lump about 2-10 cm in diameter (some may grow even larger)    It feels soft and smooth and is easily moved under the skin with the fingers    Some have a rubbery or doughy consistency    They are most common on the shoulders, neck, trunk and arms, but they can occur anywhere on the body where fat tissue is present  Most lipomas are symptomless, but some are painful on applying pressure  Lipomas that are tender or painful are usually angiolipomas  This means the lipoma has an increased number of small blood vessels  Painful lipomas are also a feature of adiposis dolorosa or Dercum disease  Diagnosis of lipoma is usually made clinically by finding a soft lump under the skin  However, if there is any doubt, a deep skin biopsy can be performed which will show typical histopathological features of lipoma and its variants  Most lipomas require no treatment  Most lipomas eventually stop growing and remain indefinitely without causing any problems  Occasionally, lipomas that interfere with the movement of adjacent muscles may require surgical removal  Several methods are available:   Simple surgical excision    Squeeze technique (a small incision is made over the lipoma and the fatty tissue is squeezed through the hole)    Liposuction    ROSACEA    Physical Exam:   Anatomic Location Affected: Face    Morphological Description:  Telangiectasia    Pertinent Positives:   Pertinent Negatives:     Additional History of Present Condition:  In the past given compounded cream through skin medicinals tretinoin-fluocinolone-kojic acid, hydroquinone and niacinamide  She states it did not work     Assessment and Plan:  Based on a thorough discussion of this condition and the management approach to it (including a comprehensive discussion of the known risks, side effects and potential benefits of treatment), the patient (family) agrees to implement the following specific plan:   Intense pulse light (IPL) or photo facial discussed    Start metronidazole 0 75% cream twice a day to face for redness    Follow up as needed     Rosacea is a chronic rash affecting the mid-face including the nose, cheeks, chin, forehead, and eyelids  The incidence is usually greatest between the ages of 30-60 years and is more common in people with fair skin  Common characteristics include redness, telangiectasias, papules and pustules over affected areas  Rosacea may look similar to acne, but there is a lack of comedones  Occasionally the eyes may also be involved in ocular rosacea  In advanced disease, enlargement of the sebaceous glands in the nose, termed rhinophyma, may be present  Rosacea results in red spots (papules) and sometimes pustules over the face, but unlike acne there are no blackheads, whiteheads, or cystic nodules  Patients often experience increased facial flushing with prominent blood vessels (erythematotelangiectatic rosacea) and dry, sensitive skin  These symptoms are exacerbated by sun exposure, hot or spicy foods, topical steroids and oil-based facial products  In ocular rosacea, eyelids may be red and sore due to conjunctivitis, keratitis, and episcleritis  If rhinophyma develops due to enlargement of sebaceous glands, the patient may have an enlarged and irregularly shaped nose with prominent pores  In rosacea that is refractory to treatment, patients can develop persistent redness and swelling of the face due to lymphatic obstruction (Morbihan disease)       Distribution around the cheeks may be confused with the malar or butterfly rash of lupus  However, the rash of lupus spares the nasal creases and lacks papules and pustules  If signs of photosensitivity, oral ulcers, arthritis, and kidney dysfunction are present then consider referral to a rheumatologist      There are many potential causes of rosacea including genetic, environmental, vascular, and inflammatory factors   These include, but are not limited to:   Chronic exposure to ultraviolet radiation    Increased immune responses in the form of cathelicidins that promote vessel dilation and infiltration with white blood cells (neutrophils) into the dermis   Increased matrix metalloproteinases such as collagen and elastase that remodel normal tissue may contribute to inflammation of the skin making it thicker and harder   There is some evidence to suggest that increased numbers of demodex mites on patient skin may contribute to rosacea papules     General Treatment Approach    Avoid exacerbating factors such as heat, spicy foods, and alcohol    Use daily SPF30+ sunscreen and other methods of coverage for sun protection   Use water-based make-up    Avoid applying topical steroids to affected areas as they can cause perioral dermatitis and exacerbate rosacea     Topical Treatment Approach   Metronidazole cream or gel by itself or in combination with oral antibiotics for more severe cases   Azelaic acid cream or lotion is effective for mild inflammatory rosacea when applied twice daily to affected areas   Brimonidine gel and oxymetazoline hydrochloride cream can reduce facial redness temporarily    Ivermectin cream can treat papulopustular rosacea by controlling demodex mites and inflammation    Pimecrolimus cream or tacrolimus ointment twice a day for 2-3 months can help reduce inflammation    Oral Treatment Approach   Antibiotics such as doxycycline, minocycline, or erythromycin for 1-3 months   Clonidine and carvedilol can help reduce facial flushing and are generally well tolerated  Common side effects include low blood pressure, gastrointestinal upset, dry eyes, blurred vision and low heart rate   Isotretinoin at low doses can be effective for long term treatment when antibiotics fail  Side effects may make it unsuitable for some patients   NSAIDs such as diclofenac can help reduce discomfort and redness in the skin       Procedural/Surgical Treatment Approach    Vascular lasers or intense pulsed light treatment may be used to treat persistent telangiectasia and papulopustular rosacea   Plastic surgery and carbon dioxide lasers may be used to treat rhinophyma     Scribe Attestation    I,:  Tu Phillips am acting as a scribe while in the presence of the attending physician :       I,:  Kannan Jones MD personally performed the services described in this documentation    as scribed in my presence :

## 2024-02-02 DIAGNOSIS — N94.3 PMS (PREMENSTRUAL SYNDROME): Primary | ICD-10-CM

## 2024-02-05 RX ORDER — FLUOXETINE 10 MG/1
10 CAPSULE ORAL DAILY
Qty: 90 CAPSULE | Refills: 1 | Status: SHIPPED | OUTPATIENT
Start: 2024-02-05

## 2024-02-07 ENCOUNTER — OFFICE VISIT (OUTPATIENT)
Dept: PLASTIC SURGERY | Facility: CLINIC | Age: 39
End: 2024-02-07

## 2024-02-07 DIAGNOSIS — Z41.1 ENCOUNTER FOR COSMETIC SURGERY: Primary | ICD-10-CM

## 2024-02-07 PROCEDURE — RECHECK: Performed by: STUDENT IN AN ORGANIZED HEALTH CARE EDUCATION/TRAINING PROGRAM

## 2024-02-07 NOTE — PROGRESS NOTES
Patient seen and examined.  Generally, patient doing well.  Reports some small areas of scabbing and central edema but does report it improving.    Breasts appear with good contour and symmetry, incisions c/d/I with small scabbing which was removed  Abdominal contour improving with central edema, small areas of scabbing and mild dehiscence along abdominal incision, umbilicus still with edema, no s/s of seroma    Encouraged continued massage and foam rolling  Continue compression, new binder ordered  Ok to increase activity as tolerated  Ok to transition to sports bra  Continue moisturization  Will begin umbilical cannulation with rita at night  Will see back in about 2 weeks prior to pt leaving for [place] Davidson, DO  Plastic and Reconstructive Surgery

## 2024-02-20 ENCOUNTER — OFFICE VISIT (OUTPATIENT)
Dept: PLASTIC SURGERY | Facility: CLINIC | Age: 39
End: 2024-02-20

## 2024-02-20 DIAGNOSIS — Z41.1 ENCOUNTER FOR COSMETIC SURGERY: Primary | ICD-10-CM

## 2024-02-20 DIAGNOSIS — N64.89 PENDULOUS BREAST: ICD-10-CM

## 2024-02-20 PROCEDURE — 99024 POSTOP FOLLOW-UP VISIT: CPT | Performed by: PHYSICIAN ASSISTANT

## 2024-02-20 NOTE — PROGRESS NOTES
Assessment and Plan:  Priscilla Houser 38 y.o. female s/p bilateral breast reduction and and abdominoplasty 1/11/24, presenting for post op visit    - incisions healing well. She has not been using a marble in her umbilicus due to not fitting. She has been using an earplug. Continue earplug at night  - massage with a foam roller. Continue compression with binder. Aquaphor to incisions  - ok to swim on cruise  - exercise as tolerated  - return in 6 weeks for post op check    Subjective:  Doing well.  Believes umbilicus is improving. She continues to be swollen which I informed her could take 6 months to resolve. She has been using an earplug at night and wearing her abdominal binder.     Objective:  NAD, AAOx3  Bilateral breast incisions healing well, small areas of eschar   Central abdominal edema, umbilicus with edema and small scab, healing lower abdominal incision    Darcy Joshi PA-C

## 2024-02-22 ENCOUNTER — TELEPHONE (OUTPATIENT)
Dept: DERMATOLOGY | Facility: CLINIC | Age: 39
End: 2024-02-22

## 2024-02-22 NOTE — TELEPHONE ENCOUNTER
Left message for patient stating that we would be canceling her appointment on 3/6 due to Dr. Rvias not being in the office.   I asked her to please give me a call back to reschedule her appointment.

## 2024-03-04 ENCOUNTER — NURSE TRIAGE (OUTPATIENT)
Age: 39
End: 2024-03-04

## 2024-03-25 DIAGNOSIS — E03.9 HYPOTHYROIDISM, UNSPECIFIED TYPE: ICD-10-CM

## 2024-03-25 RX ORDER — LEVOTHYROXINE SODIUM 137 MCG
137 TABLET ORAL DAILY
Qty: 90 TABLET | Refills: 0 | Status: SHIPPED | OUTPATIENT
Start: 2024-03-25

## 2024-04-02 ENCOUNTER — OFFICE VISIT (OUTPATIENT)
Dept: DERMATOLOGY | Facility: CLINIC | Age: 39
End: 2024-04-02
Payer: COMMERCIAL

## 2024-04-02 VITALS — WEIGHT: 214 LBS | BODY MASS INDEX: 32.07 KG/M2 | TEMPERATURE: 98.9 F

## 2024-04-02 DIAGNOSIS — L80 VITILIGO: ICD-10-CM

## 2024-04-02 DIAGNOSIS — L70.0 ACNE VULGARIS: ICD-10-CM

## 2024-04-02 DIAGNOSIS — L71.9 ROSACEA: Primary | ICD-10-CM

## 2024-04-02 PROCEDURE — 99214 OFFICE O/P EST MOD 30 MIN: CPT

## 2024-04-02 RX ORDER — TACROLIMUS 1 MG/G
OINTMENT TOPICAL
Qty: 100 G | Refills: 6 | Status: SHIPPED | OUTPATIENT
Start: 2024-04-02

## 2024-04-02 RX ORDER — SEMAGLUTIDE 0.25 MG/.5ML
INJECTION, SOLUTION SUBCUTANEOUS
COMMUNITY
Start: 2024-03-19

## 2024-04-02 RX ORDER — SPIRONOLACTONE 50 MG/1
TABLET, FILM COATED ORAL
Qty: 90 TABLET | Refills: 0 | Status: SHIPPED | OUTPATIENT
Start: 2024-04-02

## 2024-04-02 NOTE — PATIENT INSTRUCTIONS
Face    Triple rosacea cream apply daily to face.     Vitiligo     Continue excimer laser    Continue spironolactone

## 2024-04-02 NOTE — PROGRESS NOTES
"[facility] Note     Patient Name: Priscilla Houser  Encounter Date: 04/02/24     Have you been cared for by a [facility] Dermatologist in the last 3 years and, if so, which description applies to you?    Yes.  I have been here within the last 3 years, and my medical history has NOT changed since that time.  I am FEMALE/of child-bearing potential.    REVIEW OF SYSTEMS:  Have you recently had or currently have any of the following? No changes in my recent health.   PAST MEDICAL HISTORY:  Have you personally ever had or currently have any of the following?  If \"YES,\" then please provide more detail. No changes in my medical history.   HISTORY OF IMMUNOSUPPRESSION: Do you have a history of any of the following:  Systemic Immunosuppression such as Diabetes, Biologic or Immunotherapy, Chemotherapy, Organ Transplantation, Bone Marrow Transplantation?  No     Answering \"YES\" requires the addition of the dotphrase \"IMMUNOSUPPRESSED\" as the first diagnosis of the patient's visit.   FAMILY HISTORY:  Any \"first degree relatives\" (parent, brother, sister, or child) with the following?    No changes in my family's known health.   PATIENT EXPERIENCE:    Do you want the Dermatologist to perform a COMPLETE skin exam today including a clinical examination under the \"bra and underwear\" areas?  NO  If necessary, do we have your permission to call and leave a detailed message on your Preferred Phone number that includes your specific medical information?  Yes      No Known Allergies   Current Outpatient Medications:     cholecalciferol (VITAMIN D3) 25 mcg (1,000 units) tablet, Take 4,000 units daily. (Patient taking differently: Take 2,000 units daily.), Disp: , Rfl:     FLUoxetine (PROzac) 10 mg capsule, Take 1 capsule (10 mg total) by mouth daily, Disp: 90 capsule, Rfl: 1    gabapentin (Neurontin) 300 mg capsule, Take 1 capsule (300 mg total) by mouth 3 (three) times a day for 5 days, Disp: 15 capsule, Rfl: 0    " ibuprofen (MOTRIN) 800 mg tablet, Take 1 tablet (800 mg total) by mouth every 8 (eight) hours as needed for mild pain (DO NOT BEGIN UNTIL 48 HOURS AFTER SURGERY), Disp: 15 tablet, Rfl: 0    oxyCODONE (Roxicodone) 5 immediate release tablet, Take 1 tablet (5 mg total) by mouth every 6 (six) hours as needed for moderate pain Max Daily Amount: 20 mg, Disp: 10 tablet, Rfl: 0    sertraline (ZOLOFT) 25 mg tablet, Take 1 tablet (25 mg total) by mouth daily (Patient not taking: Reported on 4/21/2023), Disp: 90 tablet, Rfl: 3    spironolactone (ALDACTONE) 25 mg tablet, Take 2 tablets (50 mg total) by mouth daily, Disp: 60 tablet, Rfl: 1    Synthroid 137 MCG tablet, Take 1 tablet (137 mcg total) by mouth daily, Disp: 90 tablet, Rfl: 0    tacrolimus (PROTOPIC) 0.1 % ointment, Apply twice daily on Saturdays and Sundays to areas of lost pigmentation on body. (Patient not taking: Reported on 1/11/2024), Disp: 100 g, Rfl: 1    traMADol (Ultram) 50 mg tablet, Take 1 tablet (50 mg total) by mouth every 6 (six) hours as needed for moderate pain, Disp: 20 tablet, Rfl: 0    tretinoin (RETIN-A) 0.025 % cream, Apply topically daily at bedtime Spread one pea-sized amount of medication over entire face about one hour before bedtime., Disp: 45 g, Rfl: 0    Current Facility-Administered Medications:     lidocaine (XYLOCAINE) 1 % injection 2 mL, 2 mL, Injection, , Simone Awan DPM, 2 mL at 05/25/22 1343    triamcinolone acetonide (KENALOG-40) 40 mg/mL injection 20 mg, 20 mg, Intra-articular, , Simone Awan DPM, 20 mg at 05/25/22 1343          Whom besides the patient is providing clinical information about today's encounter?   NO ADDITIONAL HISTORIAN (patient alone provided history)    Physical Exam and Assessment/Plan by Diagnosis:      VITILIGO    Physical Exam:  Anatomic Location Affected:  hands, elbows, knees,  ankles, and groin.   Morphological Description:  depigmented macules and patches on examination with Woods lamp, < 5%  BSA      Additional History of Present Condition:  Patient presents for an evaluation of her vitiligo. She has not been seen by [facility] for this in the past. Per documented patient messages with Dr. Rivas, patient was interested in Opzelara. However given insurance restrictions and following peer to peer for this trial of topicals such as tacrolimus 0.1% ointment and triamcinolone 0.1% ointment were recommended, and in office visit was recommended for evaluation of total body surface area involved. Patient states the topicals did not help. She states the appearance of her vitiligo bothers her really in the summer when the depigmentation is more noticeable.     Assessment and Plan:  Based on a thorough discussion of this condition and the management approach to it (including a comprehensive discussion of the known risks, side effects and potential benefits of treatment), the patient (family) agrees to implement the following specific plan:  Woods lamp examination performed today and < 5% BSA affected, therefore insurance with not cover Opzelara.   Discussed continued use of the tacrolimus 0.1% ointment- apply to affected areas once a day in combination with 10 minutes of sun exposure and excimer laser.   Recommend excimer laser three times a week per Dr. Sampson's protocol. Communication sent to prior authorization department to determine if prior authorization required.   Follow-up: 1 year     ROSACEA    Physical Exam:  Anatomic Location Affected:  face  Morphological Description:  background centrofacial erythema with visible telangiectasias       Additional History of Present Condition:  Patient presents as a follow-up for her rosacea. She was prescribed metronidazole in the past but she did not use it. She notes her rosacea flares with exercise. She denies other triggers such as hot beverages, caffeine, alcohol, or sun exposure. She denies any associated eye symptoms.     Assessment and  "Plan:  Based on a thorough discussion of this condition and the management approach to it (including a comprehensive discussion of the known risks, side effects and potential benefits of treatment), the patient (family) agrees to implement the following specific plan:  Start rosacea triple cream (azelaic acid 15%, ivermectin 1%, metronidazole 1%)- apply to face once a day. This medication is a compounded medication what will come from [facility]. Advised they will reach out to patient regarding mailing address and payment information.   Recommend sun protection with SPF 30 or above such as CeraVe AM Facial Moisturizer.   Discussed referral to Dr. Torrez/Dr. Ba for a cosmetic consultation for laser therapy. Patient deferred at this time.   Follow-up as needed.     ACNE VULGARIS (\"COMMON ACNE\")    Physical Exam:  Anatomic Location Affected:  chin   Morphological Description: clear today but previously nodulocystic lesions per patient     Additional History of Present Condition:  Patient states she saw Dr. Rivas in the past for her acne and she was prescribed spironolactone 25mg twice a day which has been keeping her acne under control.     Assessment and Plan:  Continue spironolactone with adjustment of 50mg once a day as patient would prefer to only take one pill.   Risks of spironolactone reviewed, including teratogenicity and the need for pregnancy prevention while using it.   Other risks, including hyperkalemia, menstrual irregularity, breast tenderness, hypotension, dizziness/lightheadedness/orthostatic symptoms, etc were reviewed.   The patient has no history of renal/liver disease or hyperkalemia and eats a balanced diet.   Follow-up: 1 year     Scribe Attestation      I,:  Makenzie Rowe am acting as a scribe while in the presence of the attending physician.:       I,:  Keeley Szymanski PA-C personally performed the services described in this documentation    as scribed in my presence.:          "

## 2024-04-03 ENCOUNTER — OFFICE VISIT (OUTPATIENT)
Dept: PLASTIC SURGERY | Facility: CLINIC | Age: 39
End: 2024-04-03

## 2024-04-03 DIAGNOSIS — N62 MACROMASTIA: Primary | ICD-10-CM

## 2024-04-03 PROCEDURE — 99024 POSTOP FOLLOW-UP VISIT: CPT | Performed by: PHYSICIAN ASSISTANT

## 2024-04-03 NOTE — PROGRESS NOTES
Assessment and Plan:  Priscilla Houser 38 y.o. female s/p bilateral breast reduction and and abdominoplasty 1/11/24, presenting for post op visit    - continue earplug to umbilicus at night  - foam roller to abdomen and flanks  - binder for compression with swelling  - massage umbilicus scaring  - continue aquaphor for moisturizing. May also use silicone tape  - return at 6 month post op     Subjective:    Had small opening to left breast which has healed over. Small opening to left abdominal incision, no visible suture. Encouraged her to keep using earplug at night.      Objective:  NAD, AAOx3  Bilateral breast incisions healing well, no s/s of seroma  Central abdominal edema, umbilicus with inferior scarring, superficial opening to left lateral abdominal incision     Darcy Joshi PA-C

## 2024-04-08 ENCOUNTER — TELEPHONE (OUTPATIENT)
Dept: DERMATOLOGY | Facility: CLINIC | Age: 39
End: 2024-04-08

## 2024-04-08 NOTE — TELEPHONE ENCOUNTER
Following discussion with Dr. PHILLIPS, called patient and informed her we are currently waiting on a new excimer laser. Recommend patient continue to use Protopic coupled with 10 min of sunlight exposure until we receive laser. Staff will call patient to her scheduled once we have the laser. Patient expressed understanding.

## 2024-05-09 ENCOUNTER — TELEPHONE (OUTPATIENT)
Dept: FAMILY MEDICINE CLINIC | Facility: CLINIC | Age: 39
End: 2024-05-09

## 2024-05-09 DIAGNOSIS — E66.09 CLASS 1 OBESITY DUE TO EXCESS CALORIES WITHOUT SERIOUS COMORBIDITY WITH BODY MASS INDEX (BMI) OF 30.0 TO 30.9 IN ADULT: ICD-10-CM

## 2024-05-09 DIAGNOSIS — E66.09 CLASS 1 OBESITY DUE TO EXCESS CALORIES WITHOUT SERIOUS COMORBIDITY WITH BODY MASS INDEX (BMI) OF 30.0 TO 30.9 IN ADULT: Primary | ICD-10-CM

## 2024-05-09 NOTE — TELEPHONE ENCOUNTER
----- Message from Celena Peterson RN sent at 5/9/2024 12:26 PM EDT -----  Regarding: FW: Wegovy  Contact: [phone]  Requesting it be sent to [facility] mail order. Please advise.   ----- Message -----  From: Priscilla Houser  Sent: 5/9/2024  12:12 PM EDT  To: [facility] [place] [facility]  Subject: Wegovy                                           Hi! Just checking , if this Rx refill was sent did it go to [facility]? I got a message from [facility] about ordering me a medicine, but I haven’t requested anything from them. I fill the Wegovy with [facility], they have a discount card on file with them. Thanks!    Priscilla

## 2024-05-23 DIAGNOSIS — R11.0 NAUSEA: Primary | ICD-10-CM

## 2024-05-23 RX ORDER — ONDANSETRON 4 MG/1
4 TABLET, FILM COATED ORAL EVERY 8 HOURS PRN
Qty: 20 TABLET | Refills: 0 | Status: SHIPPED | OUTPATIENT
Start: 2024-05-23

## 2024-06-10 ENCOUNTER — OFFICE VISIT (OUTPATIENT)
Dept: FAMILY MEDICINE CLINIC | Facility: CLINIC | Age: 39
End: 2024-06-10
Payer: COMMERCIAL

## 2024-06-10 VITALS
DIASTOLIC BLOOD PRESSURE: 82 MMHG | WEIGHT: 201 LBS | HEART RATE: 86 BPM | RESPIRATION RATE: 16 BRPM | OXYGEN SATURATION: 98 % | TEMPERATURE: 97.8 F | SYSTOLIC BLOOD PRESSURE: 120 MMHG | HEIGHT: 69 IN | BODY MASS INDEX: 29.77 KG/M2

## 2024-06-10 DIAGNOSIS — E78.2 MIXED HYPERLIPIDEMIA: ICD-10-CM

## 2024-06-10 DIAGNOSIS — E03.9 HYPOTHYROIDISM, UNSPECIFIED TYPE: ICD-10-CM

## 2024-06-10 DIAGNOSIS — R69 TAKING MEDICATION FOR CHRONIC DISEASE: ICD-10-CM

## 2024-06-10 DIAGNOSIS — N63.13 MASS OF LOWER OUTER QUADRANT OF RIGHT BREAST: ICD-10-CM

## 2024-06-10 DIAGNOSIS — Z13.1 DIABETES MELLITUS SCREENING: ICD-10-CM

## 2024-06-10 DIAGNOSIS — L70.0 ACNE VULGARIS: ICD-10-CM

## 2024-06-10 DIAGNOSIS — E66.09 CLASS 1 OBESITY DUE TO EXCESS CALORIES WITHOUT SERIOUS COMORBIDITY WITH BODY MASS INDEX (BMI) OF 30.0 TO 30.9 IN ADULT: Primary | ICD-10-CM

## 2024-06-10 PROCEDURE — 99214 OFFICE O/P EST MOD 30 MIN: CPT | Performed by: PHYSICIAN ASSISTANT

## 2024-06-10 RX ORDER — LEVOTHYROXINE SODIUM 137 MCG
137 TABLET ORAL DAILY
Qty: 90 TABLET | Refills: 3 | Status: SHIPPED | OUTPATIENT
Start: 2024-06-10

## 2024-06-10 RX ORDER — SPIRONOLACTONE 50 MG/1
TABLET, FILM COATED ORAL
Qty: 90 TABLET | Refills: 3 | Status: SHIPPED | OUTPATIENT
Start: 2024-06-10

## 2024-06-10 NOTE — PROGRESS NOTES
Assessment/Plan:    Obesity - will continue wegovy 1 mg weekly as this has been successful  Hypothyroid - due for labs in , on synthroid 137 mcg daily  Acne vulgaris  well controlled with retina  Mass lower outer quadrant right breast - diagnostic mammo with us if needed  Hyperlipidemia - controlled with diet, exercise, due for lipids and A1c for insurance    F/u as needed        Subjective:   Chief Complaint   Patient presents with    Medication Management     Follow-up on Wegovy    Breast Mass     Lump on right breast   First noticed yesterday  Not painful   Last mammo 2023      Patient ID: Priscilla Houser is a 38 y.o. female.    Patient here for follow up. Feeling good on wegovy, denies side effects    Noted a mass in right breast past two weeks, was on period when began, has had a recent breast reduction and though possible seroma at first but has been about 6 months.     Compliant with synthroid.         The following portions of the patient's history were reviewed and updated as appropriate: allergies, current medications, past family history, past medical history, past social history, past surgical history, and problem list.    Past Medical History:   Diagnosis Date    Abnormal Pap smear of cervix     AMA (advanced maternal age) multigravida 35+     Cystic fibrosis carrier     Disease of thyroid gland     hashimotos thyroiditis, nodule, hypo    Female infertility     3 years of infertilty treatments; this is a spontaneous pregnancy    HPV (human papilloma virus) infection     Migraine     PVC (premature ventricular contraction)     daily had them intermittently    PVC (premature ventricular contraction)     Varicella     childhood    Vitiligo      Past Surgical History:   Procedure Laterality Date     SECTION       SECTION  2019    COLPOSCOPY      DILATION AND CURETTAGE OF UTERUS      age 18 with TOP, and SAB in 2017    LIPOMA RESECTION      OPERATIVE HYSTEROSCOPY       removal of septum    SC BREAST REDUCTION Bilateral 2024    Procedure: BILATERAL BREAST REDUCTION;  Surgeon: Ayaka Davidson DO;  Location:  MAIN OR;  Service: Plastics    SC  DELIVERY ONLY N/A 2019    Procedure:  SECTION () REPEAT;  Surgeon: Sheba Velasquez MD;  Location: [facility] LD;  Service: Obstetrics    SC  DELIVERY ONLY N/A 10/27/2020    Procedure:  SECTION () REPEAT;  Surgeon: Kamini Garcia MD;  Location: [facility] LD;  Service: Obstetrics    SC EXC B9 LESION MRGN XCP SK TG T/A/L 3.1-4.0 CM Left 10/10/2022    Procedure: EXCISION of LEFT SHOULDER MASS;  Surgeon: Angel Winter DO;  Location: [facility] MAIN OR;  Service: General    SC EXCISION EXCESSIVE SKIN & SUBQ TISSUE ABDOMEN N/A 2024    Procedure: ABDOMINOPLASTY;  Surgeon: Ayaka Davidson DO;  Location:  MAIN OR;  Service: Plastics    SC LIG/TRNSXJ FALOPIAN TUBE  DEL/ABDML SURG Bilateral 10/27/2020    Procedure: LIGATION/COAGULATION TUBAL;  Surgeon: Kamini Garcia MD;  Location: [facility] ;  Service: Obstetrics    REDUCTION MAMMAPLASTY Bilateral     ?    TUBAL LIGATION      US GUIDED THYROID BIOPSY  2015     Family History   Problem Relation Age of Onset    Hypertension Mother     Multiple sclerosis Mother     Hashimoto's thyroiditis Mother     Factor V Leiden deficiency Mother     Other Mother         ms    Transient ischemic attack Father     Thyroid disease Sister     Factor V Leiden deficiency Sister     Factor V Leiden deficiency Sister     Celiac disease Sister     No Known Problems Daughter     No Known Problems Daughter     COPD Maternal Grandmother     Deep vein thrombosis Maternal Grandfather     Diabetes Paternal Grandmother         TYPE 2    Cancer Paternal Grandfather         [email or web address]    Hashimoto's thyroiditis Brother     Lupus Brother     No Known Problems Son     Cystic fibrosis Maternal Aunt 8    No Known Problems Maternal Uncle      No Known Problems Paternal Aunt     No Known Problems Paternal Aunt     No Known Problems Paternal Aunt     No Known Problems Paternal Uncle     Breast cancer Neg Hx     Colon cancer Neg Hx      Social History     Socioeconomic History    Marital status: /Civil Union     Spouse name: Not on file    Number of children: Not on file    Years of education: Not on file    Highest education level: Not on file   Occupational History    Not on file   Tobacco Use    Smoking status: Never    Smokeless tobacco: Never   Vaping Use    Vaping status: Never Used   Substance and Sexual Activity    Alcohol use: Not Currently     Comment: Socially 1-2 per month    Drug use: Never    Sexual activity: Yes     Partners: Male     Birth control/protection: None   Other Topics Concern    Not on file   Social History Narrative    CAFFEINE USE     Social Determinants of Health     Financial Resource Strain: Not on file   Food Insecurity: Not on file   Transportation Needs: Not on file   Physical Activity: Not on file   Stress: Not on file   Social Connections: Not on file   Intimate Partner Violence: Not on file   Housing Stability: Not on file       Current Outpatient Medications:     cholecalciferol (VITAMIN D3) 25 mcg (1,000 units) tablet, Take 4,000 units daily. (Patient taking differently: Take 2,000 units daily.), Disp: , Rfl:     ibuprofen (MOTRIN) 800 mg tablet, Take 1 tablet (800 mg total) by mouth every 8 (eight) hours as needed for mild pain (DO NOT BEGIN UNTIL 48 HOURS AFTER SURGERY), Disp: 15 tablet, Rfl: 0    ondansetron (ZOFRAN) 4 mg tablet, Take 1 tablet (4 mg total) by mouth every 8 (eight) hours as needed for nausea or vomiting, Disp: 20 tablet, Rfl: 0    Semaglutide-Weight Management (WEGOVY) 1 MG/0.5ML, Inject 0.5 mL (1 mg total) under the skin once a week, Disp: 6 mL, Rfl: 1    spironolactone (ALDACTONE) 50 mg tablet, Take one tablet by mouth daily., Disp: 90 tablet, Rfl: 3    Synthroid 137 MCG tablet, Take 1  "tablet (137 mcg total) by mouth daily, Disp: 90 tablet, Rfl: 3    tretinoin (RETIN-A) 0.025 % cream, Apply topically daily at bedtime Spread one pea-sized amount of medication over entire face about one hour before bedtime., Disp: 45 g, Rfl: 0    metroNIDAZOLE (METROCREAM) 0.75 % cream, Apply to face once a day. Avoid eyes and mouth. (Patient not taking: Reported on 6/10/2024), Disp: 30 g, Rfl: 3    tacrolimus (PROTOPIC) 0.1 % ointment, Apply to affected areas once a day. (Patient not taking: Reported on 6/10/2024), Disp: 100 g, Rfl: 6    Current Facility-Administered Medications:     lidocaine (XYLOCAINE) 1 % injection 2 mL, 2 mL, Injection, , Simone Awan DPM, 2 mL at 05/25/22 1343    triamcinolone acetonide (KENALOG-40) 40 mg/mL injection 20 mg, 20 mg, Intra-articular, , Simone Awan DPM, 20 mg at 05/25/22 1343    Review of Systems          Objective:    Vitals:    06/10/24 1018   BP: 120/82   Pulse: 86   Resp: 16   Temp: 97.8 °F (36.6 °C)   TempSrc: Temporal   SpO2: 98%   Weight: 91.2 kg (201 lb)   Height: 5' 8.5\" (1.74 m)        Physical Exam  Constitutional:       Appearance: Normal appearance.   HENT:      Head: Normocephalic and atraumatic.   Cardiovascular:      Rate and Rhythm: Normal rate and regular rhythm.      Pulses: Normal pulses.      Heart sounds: Normal heart sounds.   Pulmonary:      Effort: Pulmonary effort is normal.      Breath sounds: Normal breath sounds.   Chest:          Comments: Lower outer quadrant with soft intradermal movable mass, not tender  Skin:     General: Skin is warm.   Neurological:      General: No focal deficit present.      Mental Status: She is alert and oriented to person, place, and time.   Psychiatric:         Mood and Affect: Mood normal.         Behavior: Behavior normal.               "

## 2024-06-14 ENCOUNTER — TELEPHONE (OUTPATIENT)
Age: 39
End: 2024-06-14

## 2024-06-14 DIAGNOSIS — E66.09 CLASS 1 OBESITY DUE TO EXCESS CALORIES WITHOUT SERIOUS COMORBIDITY WITH BODY MASS INDEX (BMI) OF 30.0 TO 30.9 IN ADULT: ICD-10-CM

## 2024-06-14 RX ORDER — SEMAGLUTIDE 1.7 MG/.75ML
INJECTION, SOLUTION SUBCUTANEOUS
Qty: 3 ML | Refills: 0 | Status: SHIPPED | OUTPATIENT
Start: 2024-06-14

## 2024-06-14 RX ORDER — SEMAGLUTIDE 1.7 MG/.75ML
INJECTION, SOLUTION SUBCUTANEOUS
Qty: 3 ML | Refills: 0 | Status: SHIPPED | OUTPATIENT
Start: 2024-06-14 | End: 2024-06-14 | Stop reason: SDUPTHER

## 2024-06-14 NOTE — TELEPHONE ENCOUNTER
Please be advised, patient was informed that if she was to stay on the 1mg dose of wegovy her insurance would need a PA with the reasoning behind not moving up to next titrated dose. Patient knows this is what was discussed and mutually agreed to between Dr Reece and herself. However, she is also agreeable to go up to the next dose as she is due for her next injection this Sunday, and if she has any side effects she will report and go back down to the 1mg. Please advise patient, after speaking with provider for recommendation. If provider feels appropriate please send the next dose increase refill to her pharmacy. Thank you

## 2024-07-15 ENCOUNTER — TELEPHONE (OUTPATIENT)
Age: 39
End: 2024-07-15

## 2024-07-23 ENCOUNTER — HOSPITAL ENCOUNTER (OUTPATIENT)
Dept: MAMMOGRAPHY | Facility: CLINIC | Age: 39
Discharge: HOME | End: 2024-07-23
Payer: COMMERCIAL

## 2024-07-23 ENCOUNTER — HOSPITAL ENCOUNTER (OUTPATIENT)
Dept: ULTRASOUND IMAGING | Facility: CLINIC | Age: 39
Discharge: HOME | End: 2024-07-23
Payer: COMMERCIAL

## 2024-07-23 VITALS — WEIGHT: 196 LBS | HEIGHT: 69 IN | BODY MASS INDEX: 29.03 KG/M2

## 2024-07-23 DIAGNOSIS — N63.13 MASS OF LOWER OUTER QUADRANT OF RIGHT BREAST: ICD-10-CM

## 2024-07-23 PROCEDURE — G0279 TOMOSYNTHESIS, MAMMO: HCPCS

## 2024-07-23 PROCEDURE — 77066 DX MAMMO INCL CAD BI: CPT

## 2024-07-23 PROCEDURE — 76642 ULTRASOUND BREAST LIMITED: CPT

## 2024-07-24 ENCOUNTER — OFFICE VISIT (OUTPATIENT)
Dept: PLASTIC SURGERY | Facility: CLINIC | Age: 39
End: 2024-07-24

## 2024-07-24 DIAGNOSIS — Z98.890 S/P COSMETIC PLASTIC SURGERY: Primary | ICD-10-CM

## 2024-07-24 PROCEDURE — 99213 OFFICE O/P EST LOW 20 MIN: CPT | Performed by: PHYSICIAN ASSISTANT

## 2024-07-24 NOTE — PROGRESS NOTES
Assessment and Plan:  Priscilla Houser 38 y.o. female s/p bilateral breast reduction and and abdominoplasty 1/11/24, presenting for post op visit    - healing great  - continue massage to abdomen and compression while swollen  - will discuss with Dr. Davidson concerning upper abdomen fullness and left hip fullness  - return in 3-4 months with Dr. Davidson to determine if any revisions are warranted    Subjective:     Patient is happy with her appearance of her breasts. She is currently on wegovy and has lost approximately 10 pounds. She feels well on the medication. States she is exercising. She has some abdominal fullness and left hip fullness.     Objective:  NAD, AAOx3  Bilateral breast scars lightening  Abdomen: scar healing well. Mild upper abdomen and left hip fullness     Darcy Joshi PA-C

## 2024-08-05 ENCOUNTER — TELEPHONE (OUTPATIENT)
Age: 39
End: 2024-08-05

## 2024-08-05 DIAGNOSIS — E66.09 CLASS 1 OBESITY DUE TO EXCESS CALORIES WITHOUT SERIOUS COMORBIDITY WITH BODY MASS INDEX (BMI) OF 30.0 TO 30.9 IN ADULT: ICD-10-CM

## 2024-08-05 NOTE — TELEPHONE ENCOUNTER
Patient called to report that her current dose of wegovy 1.7, which she has taken 3 injections thus far, have been causing her some GI side effects, nausea has become a daily occurrence. Patient is requesting going back down to the lesser dose. Please be advised that if provider feels appropriate to reduce the dose patients insurance will need a new prior authorization.  Please follow up with patient after consulting with provider

## 2024-08-07 NOTE — TELEPHONE ENCOUNTER
Reason for call:   [x] Prior Auth  [] Other:     Caller:  [x] Patient  [] Pharmacy  Name:   Address:   Callback Number:     Medication: Wegovy     Dose/Frequency: 1 mg    Quantity: 6 mL    Ordering Provider:   [x] PCP/Provider -   [] Speciality/Provider -     Has the patient tried other medications and failed? If failed, which medications did they fail?    [x] No   [] Yes -     Is the patient's insurance updated in EPIC?   [] Yes   [] No     Is a copy of the patient's insurance scanned in EPIC?   [x] Yes   [] No     Patient prefers [facility] - [place], [place] - [address] [facility] [address] [facility] [address], [place] [address]

## 2024-08-14 ENCOUNTER — OFFICE VISIT (OUTPATIENT)
Age: 39
End: 2024-08-14
Payer: COMMERCIAL

## 2024-08-14 VITALS
HEIGHT: 69 IN | DIASTOLIC BLOOD PRESSURE: 64 MMHG | WEIGHT: 195.6 LBS | BODY MASS INDEX: 28.97 KG/M2 | SYSTOLIC BLOOD PRESSURE: 118 MMHG

## 2024-08-14 DIAGNOSIS — Z01.419 ROUTINE GYNECOLOGICAL EXAMINATION: Primary | ICD-10-CM

## 2024-08-14 DIAGNOSIS — N94.3 PMS (PREMENSTRUAL SYNDROME): ICD-10-CM

## 2024-08-14 PROCEDURE — S0612 ANNUAL GYNECOLOGICAL EXAMINA: HCPCS | Performed by: OBSTETRICS & GYNECOLOGY

## 2024-08-14 NOTE — PROGRESS NOTES
Priscilla Houser  1985      CC:  Yearly exam    S:  39 y.o. female here for yearly exam.     Bothered by luteal phase mood changes.     Period Cycle (Days): 28  Period Duration (Days): 5  Period Pattern: Regular  Menstrual Flow: Moderate  Menstrual Control: Tampon  Menstrual Control Change Freq (Hours): 4-6  Dysmenorrhea: (!) Mild  Dysmenorrhea Symptoms: Cramping, Other (Comment), Headache (mood changes during cycle and ovulation)    She denies vaginal discharge, itching, pelvic pain.   She has no urinary concerns, does have occasional stress incontinence.  No bowel concerns.  No breast concerns.     Sexual activity: She is sexually active without pain, bleeding or dryness.   She is   and monogamous.   She is not interested in STD screening today.     Contraception: She uses tubal ligation for contraception.     Last Pap: 7/13/23 - NILM, Neg HPV  Last Mammo:  7/23/24 - BIRad 3, scheduled for follow up    We reviewed ASCCP guidelines for Pap testing today.     Family hx of breast cancer: no  Family hx of ovarian cancer: no  Family hx of colon cancer: no      Current Outpatient Medications:     cholecalciferol (VITAMIN D3) 25 mcg (1,000 units) tablet, Take 4,000 units daily. (Patient taking differently: Take 2,000 units daily.), Disp: , Rfl:     ibuprofen (MOTRIN) 800 mg tablet, Take 1 tablet (800 mg total) by mouth every 8 (eight) hours as needed for mild pain (DO NOT BEGIN UNTIL 48 HOURS AFTER SURGERY), Disp: 15 tablet, Rfl: 0    ondansetron (ZOFRAN) 4 mg tablet, Take 1 tablet (4 mg total) by mouth every 8 (eight) hours as needed for nausea or vomiting, Disp: 20 tablet, Rfl: 0    Semaglutide-Weight Management (WEGOVY) 1 MG/0.5ML, Inject 0.5 mL (1 mg total) under the skin once a week, Disp: 6 mL, Rfl: 1    spironolactone (ALDACTONE) 50 mg tablet, Take one tablet by mouth daily., Disp: 90 tablet, Rfl: 3    Synthroid 137 MCG tablet, Take 1 tablet (137 mcg total) by mouth daily, Disp: 90 tablet, Rfl: 3     metroNIDAZOLE (METROCREAM) 0.75 % cream, Apply to face once a day. Avoid eyes and mouth. (Patient not taking: Reported on 6/10/2024), Disp: 30 g, Rfl: 3    tacrolimus (PROTOPIC) 0.1 % ointment, Apply to affected areas once a day. (Patient not taking: Reported on 6/10/2024), Disp: 100 g, Rfl: 6    tretinoin (RETIN-A) 0.025 % cream, Apply topically daily at bedtime Spread one pea-sized amount of medication over entire face about one hour before bedtime. (Patient not taking: Reported on 8/14/2024), Disp: 45 g, Rfl: 0    Current Facility-Administered Medications:     lidocaine (XYLOCAINE) 1 % injection 2 mL, 2 mL, Injection, , Simone Awan DPM, 2 mL at 05/25/22 1343    triamcinolone acetonide (KENALOG-40) 40 mg/mL injection 20 mg, 20 mg, Intra-articular, , Simone Awan DPM, 20 mg at 05/25/22 1343  Patient Active Problem List   Diagnosis    Multiple thyroid nodules    Vitamin D deficiency    Hypothyroidism    Cystic fibrosis carrier    Status post bilateral salpingectomy    Lipoma of torso    Acne    Mixed hyperlipidemia    Class 1 obesity due to excess calories without serious comorbidity with body mass index (BMI) of 30.0 to 30.9 in adult    Macromastia    S/P cosmetic plastic surgery     Past Medical History:   Diagnosis Date    Abnormal Pap smear of cervix 2007    AMA (advanced maternal age) multigravida 35+     Cystic fibrosis carrier     Disease of thyroid gland     hashimotos thyroiditis, nodule, hypo    Female infertility     3 years of infertilty treatments; this is a spontaneous pregnancy    HPV (human papilloma virus) infection     Migraine     PVC (premature ventricular contraction)     daily had them intermittently    PVC (premature ventricular contraction)     Varicella     childhood    Vitiligo      Family History   Problem Relation Age of Onset    Hypertension Mother     Multiple sclerosis Mother     Hashimoto's thyroiditis Mother     Factor V Leiden deficiency Mother     Other Mother         ms    Transient  "ischemic attack Father     Thyroid disease Sister     Factor V Leiden deficiency Sister     Factor V Leiden deficiency Sister     Celiac disease Sister     No Known Problems Daughter     No Known Problems Daughter     COPD Maternal Grandmother     Deep vein thrombosis Maternal Grandfather     Diabetes Paternal Grandmother         TYPE 2    Cancer Paternal Grandfather         leukemis    Hashimoto's thyroiditis Brother     Lupus Brother     No Known Problems Son     Cystic fibrosis Maternal Aunt 8    No Known Problems Maternal Uncle     No Known Problems Paternal Aunt     No Known Problems Paternal Aunt     No Known Problems Paternal Aunt     No Known Problems Paternal Uncle     Breast cancer Neg Hx     Colon cancer Neg Hx        Review of Systems   Respiratory: Negative.    Cardiovascular: Negative.    Gastrointestinal: Negative for constipation and diarrhea.     O:  Blood pressure 118/64, height 5' 8.5\" (1.74 m), weight 88.7 kg (195 lb 9.6 oz), last menstrual period 07/23/2024, not currently breastfeeding.    Patient appears well and is not in distress  Breasts are symmetrical without mass, tenderness, nipple discharge, skin changes or adenopathy.  Bilateral reduction scars, small lump on right side - c/w fat necrosis seen on imaging  Abdomen is soft and nontender without masses, abdominoplasty scar present  External genitals are normal without lesions or rashes.  Urethral meatus and urethra are normal  Bladder is normal to palpation  Vagina is normal without discharge or bleeding.   Cervix is normal without discharge or lesion.   Uterus is normal, mobile, nontender without palpable mass.  Adnexa are normal, nontender, without palpable mass.     A:  Yearly exam, Mood changes.     P:   Pap & HPV up to date   Mammo scheduled     She is going to work on exercise and potentially try CBD products to see if helps her mood.  Did also try Prozac without much improvement.  Would be open to OCP if needed  - she will let me " know in a few months.  (Lexy/Radha)     RTO one year for yearly exam or sooner as needed.

## 2024-08-15 ENCOUNTER — TELEPHONE (OUTPATIENT)
Dept: DERMATOLOGY | Facility: CLINIC | Age: 39
End: 2024-08-15

## 2024-08-15 NOTE — TELEPHONE ENCOUNTER
Called and offered patient [facility]imer appointments. Patient declined at this time. Removed from wait list

## 2024-09-09 ENCOUNTER — APPOINTMENT (OUTPATIENT)
Dept: LAB | Facility: CLINIC | Age: 39
End: 2024-09-09
Payer: COMMERCIAL

## 2024-09-09 DIAGNOSIS — Z13.1 DIABETES MELLITUS SCREENING: ICD-10-CM

## 2024-09-09 DIAGNOSIS — R69 TAKING MEDICATION FOR CHRONIC DISEASE: ICD-10-CM

## 2024-09-09 DIAGNOSIS — E78.2 MIXED HYPERLIPIDEMIA: ICD-10-CM

## 2024-09-09 DIAGNOSIS — E03.9 HYPOTHYROIDISM, UNSPECIFIED TYPE: ICD-10-CM

## 2024-09-09 DIAGNOSIS — Z00.8 HEALTH EXAMINATION IN POPULATION SURVEY: ICD-10-CM

## 2024-09-09 LAB
ALBUMIN SERPL BCG-MCNC: 4 G/DL (ref 3.5–5)
ALP SERPL-CCNC: 58 U/L (ref 34–104)
ALT SERPL W P-5'-P-CCNC: 10 U/L (ref 7–52)
ANION GAP SERPL CALCULATED.3IONS-SCNC: 9 MMOL/L (ref 4–13)
AST SERPL W P-5'-P-CCNC: 13 U/L (ref 13–39)
BASOPHILS # BLD AUTO: 0.03 THOUSANDS/ÂΜL (ref 0–0.1)
BASOPHILS NFR BLD AUTO: 0 % (ref 0–1)
BILIRUB SERPL-MCNC: 0.43 MG/DL (ref 0.2–1)
BUN SERPL-MCNC: 12 MG/DL (ref 5–25)
CALCIUM SERPL-MCNC: 9 MG/DL (ref 8.4–10.2)
CHLORIDE SERPL-SCNC: 106 MMOL/L (ref 96–108)
CHOLEST SERPL-MCNC: 153 MG/DL
CO2 SERPL-SCNC: 24 MMOL/L (ref 21–32)
CREAT SERPL-MCNC: 0.98 MG/DL (ref 0.6–1.3)
EOSINOPHIL # BLD AUTO: 0.2 THOUSAND/ÂΜL (ref 0–0.61)
EOSINOPHIL NFR BLD AUTO: 3 % (ref 0–6)
ERYTHROCYTE [DISTWIDTH] IN BLOOD BY AUTOMATED COUNT: 12.6 % (ref 11.6–15.1)
GFR SERPL CREATININE-BSD FRML MDRD: 71 ML/MIN/1.73SQ M
GLUCOSE SERPL-MCNC: 96 MG/DL (ref 65–140)
HCT VFR BLD AUTO: 41.1 % (ref 34.8–46.1)
HDLC SERPL-MCNC: 33 MG/DL
HGB BLD-MCNC: 13.8 G/DL (ref 11.5–15.4)
IMM GRANULOCYTES # BLD AUTO: 0.02 THOUSAND/UL (ref 0–0.2)
IMM GRANULOCYTES NFR BLD AUTO: 0 % (ref 0–2)
LDLC SERPL CALC-MCNC: 92 MG/DL (ref 0–100)
LYMPHOCYTES # BLD AUTO: 2.37 THOUSANDS/ÂΜL (ref 0.6–4.47)
LYMPHOCYTES NFR BLD AUTO: 34 % (ref 14–44)
MCH RBC QN AUTO: 29.2 PG (ref 26.8–34.3)
MCHC RBC AUTO-ENTMCNC: 33.6 G/DL (ref 31.4–37.4)
MCV RBC AUTO: 87 FL (ref 82–98)
MONOCYTES # BLD AUTO: 0.42 THOUSAND/ÂΜL (ref 0.17–1.22)
MONOCYTES NFR BLD AUTO: 6 % (ref 4–12)
NEUTROPHILS # BLD AUTO: 3.93 THOUSANDS/ÂΜL (ref 1.85–7.62)
NEUTS SEG NFR BLD AUTO: 57 % (ref 43–75)
NRBC BLD AUTO-RTO: 0 /100 WBCS
PLATELET # BLD AUTO: 210 THOUSANDS/UL (ref 149–390)
PMV BLD AUTO: 11.6 FL (ref 8.9–12.7)
POTASSIUM SERPL-SCNC: 4 MMOL/L (ref 3.5–5.3)
PROT SERPL-MCNC: 6.6 G/DL (ref 6.4–8.4)
RBC # BLD AUTO: 4.73 MILLION/UL (ref 3.81–5.12)
SODIUM SERPL-SCNC: 139 MMOL/L (ref 135–147)
T4 FREE SERPL-MCNC: 0.87 NG/DL (ref 0.61–1.12)
TRIGL SERPL-MCNC: 138 MG/DL
TSH SERPL DL<=0.05 MIU/L-ACNC: 0.43 UIU/ML (ref 0.45–4.5)
WBC # BLD AUTO: 6.97 THOUSAND/UL (ref 4.31–10.16)

## 2024-09-09 PROCEDURE — 84439 ASSAY OF FREE THYROXINE: CPT

## 2024-09-09 PROCEDURE — 85025 COMPLETE CBC W/AUTO DIFF WBC: CPT

## 2024-09-09 PROCEDURE — 84443 ASSAY THYROID STIM HORMONE: CPT

## 2024-09-09 PROCEDURE — 80061 LIPID PANEL: CPT

## 2024-09-09 PROCEDURE — 83036 HEMOGLOBIN GLYCOSYLATED A1C: CPT

## 2024-09-09 PROCEDURE — 36415 COLL VENOUS BLD VENIPUNCTURE: CPT

## 2024-09-09 PROCEDURE — 80053 COMPREHEN METABOLIC PANEL: CPT

## 2024-09-10 LAB
EST. AVERAGE GLUCOSE BLD GHB EST-MCNC: 111 MG/DL
HBA1C MFR BLD: 5.5 %

## 2024-10-01 DIAGNOSIS — E66.811 CLASS 1 OBESITY DUE TO EXCESS CALORIES WITHOUT SERIOUS COMORBIDITY WITH BODY MASS INDEX (BMI) OF 30.0 TO 30.9 IN ADULT: Primary | ICD-10-CM

## 2024-10-01 DIAGNOSIS — E66.09 CLASS 1 OBESITY DUE TO EXCESS CALORIES WITHOUT SERIOUS COMORBIDITY WITH BODY MASS INDEX (BMI) OF 30.0 TO 30.9 IN ADULT: Primary | ICD-10-CM

## 2024-10-01 RX ORDER — SEMAGLUTIDE 1.7 MG/.75ML
INJECTION, SOLUTION SUBCUTANEOUS
Qty: 9 ML | Refills: 0 | Status: SHIPPED | OUTPATIENT
Start: 2024-10-01

## 2024-10-09 ENCOUNTER — TELEPHONE (OUTPATIENT)
Dept: PLASTIC SURGERY | Facility: CLINIC | Age: 39
End: 2024-10-09

## 2024-10-09 NOTE — TELEPHONE ENCOUNTER
Received return call from patient to reschedule from 10/28 appt with Dr. Davidson. Rescheduled to 12/23/2024 at 9:45 am with Dr. Davidson at [place]. Gave office address to patient.     Patient verbalized understanding.

## 2024-11-08 ENCOUNTER — OFFICE VISIT (OUTPATIENT)
Dept: FAMILY MEDICINE CLINIC | Facility: CLINIC | Age: 39
End: 2024-11-08
Payer: COMMERCIAL

## 2024-11-08 VITALS
BODY MASS INDEX: 27.7 KG/M2 | DIASTOLIC BLOOD PRESSURE: 74 MMHG | HEIGHT: 69 IN | WEIGHT: 187 LBS | RESPIRATION RATE: 16 BRPM | SYSTOLIC BLOOD PRESSURE: 116 MMHG | TEMPERATURE: 97.8 F | HEART RATE: 92 BPM | OXYGEN SATURATION: 98 %

## 2024-11-08 DIAGNOSIS — M54.50 ACUTE MIDLINE LOW BACK PAIN WITHOUT SCIATICA: Primary | ICD-10-CM

## 2024-11-08 PROCEDURE — 99213 OFFICE O/P EST LOW 20 MIN: CPT | Performed by: PHYSICIAN ASSISTANT

## 2024-11-08 NOTE — PROGRESS NOTES
Assessment/Plan:    Low back pain - refer to PT for strengthening and stretching    F/u as needed    Subjective:   Chief Complaint   Patient presents with    Back Pain     Low back pain for several days      Patient ID: Priscilla Houser is a 39 y.o. female.    Patient with low back pain off and on for years. Achy, worse with laying flat, sitting. Denies radiation, denies trauma. Took some tylenol and ibuprofen last week with some relief. Has been happening off and on for years. 2 years ago seen for this after heavy exercise, XR normal. Normal urination.        The following portions of the patient's history were reviewed and updated as appropriate: allergies, current medications, past family history, past medical history, past social history, past surgical history, and problem list.    Past Medical History:   Diagnosis Date    Abnormal Pap smear of cervix     AMA (advanced maternal age) multigravida 35+     Cystic fibrosis carrier     Disease of thyroid gland     hashimotos thyroiditis, nodule, hypo    Female infertility     3 years of infertilty treatments; this is a spontaneous pregnancy    HPV (human papilloma virus) infection     Migraine     PVC (premature ventricular contraction)     daily had them intermittently    PVC (premature ventricular contraction)     Varicella     childhood    Vitiligo      Past Surgical History:   Procedure Laterality Date     SECTION  2013     SECTION  2019    COLPOSCOPY      DILATION AND CURETTAGE OF UTERUS      age 18 with TOP, and SAB in 2017    LIPOMA RESECTION      OPERATIVE HYSTEROSCOPY      removal of septum    ND BREAST REDUCTION Bilateral 2024    Procedure: BILATERAL BREAST REDUCTION;  Surgeon: Ayaka Davidson DO;  Location:  MAIN OR;  Service: Plastics    ND  DELIVERY ONLY N/A 2019    Procedure:  SECTION () REPEAT;  Surgeon: Sheba Velasquez MD;  Location: [facility];  Service: Obstetrics    ND   DELIVERY ONLY N/A 10/27/2020    Procedure:  SECTION () REPEAT;  Surgeon: Kamini Garcia MD;  Location: [facility];  Service: Obstetrics    AZ EXC B9 LESION MRGN XCP SK TG T/A/L 3.1-4.0 CM Left 10/10/2022    Procedure: EXCISION of LEFT SHOULDER MASS;  Surgeon: Angel Winter DO;  Location: [facility];  Service: General    AZ EXCISION EXCESSIVE SKIN & SUBQ TISSUE ABDOMEN N/A 2024    Procedure: ABDOMINOPLASTY;  Surgeon: Ayaka Davidson DO;  Location:  MAIN OR;  Service: Plastics    AZ LIG/TRNSXJ FALOPIAN TUBE  DEL/ABDML SURG Bilateral 10/27/2020    Procedure: LIGATION/COAGULATION TUBAL;  Surgeon: Kamini Garcia MD;  Location: [facility];  Service: Obstetrics    REDUCTION MAMMAPLASTY Bilateral     ?    TUBAL LIGATION      US GUIDED THYROID BIOPSY  2015     Family History   Problem Relation Age of Onset    Hypertension Mother     Multiple sclerosis Mother     Hashimoto's thyroiditis Mother     Factor V Leiden deficiency Mother     Other Mother         ms    Transient ischemic attack Father     Thyroid disease Sister     Factor V Leiden deficiency Sister     Factor V Leiden deficiency Sister     Celiac disease Sister     No Known Problems Daughter     No Known Problems Daughter     COPD Maternal Grandmother     Deep vein thrombosis Maternal Grandfather     Diabetes Paternal Grandmother         TYPE 2    Cancer Paternal Grandfather         leukemis    Hashimoto's thyroiditis Brother     Lupus Brother     No Known Problems Son     Cystic fibrosis Maternal Aunt 8    No Known Problems Maternal Uncle     No Known Problems Paternal Aunt     No Known Problems Paternal Aunt     No Known Problems Paternal Aunt     No Known Problems Paternal Uncle     Breast cancer Neg Hx     Colon cancer Neg Hx      Social History     Socioeconomic History    Marital status: /Civil Union     Spouse name: Not on file    Number of children: Not on file    Years of education:  Not on file    Highest education level: Not on file   Occupational History    Not on file   Tobacco Use    Smoking status: Never    Smokeless tobacco: Never   Vaping Use    Vaping status: Never Used   Substance and Sexual Activity    Alcohol use: Not Currently     Comment: Socially 1-2 per month    Drug use: Never    Sexual activity: Yes     Partners: Male     Birth control/protection: None   Other Topics Concern    Not on file   Social History Narrative    CAFFEINE USE     Social Determinants of Health     Financial Resource Strain: Not on file   Food Insecurity: Not on file   Transportation Needs: Not on file   Physical Activity: Not on file   Stress: Not on file   Social Connections: Not on file   Intimate Partner Violence: Not on file   Housing Stability: Not on file       Current Outpatient Medications:     ibuprofen (MOTRIN) 800 mg tablet, Take 1 tablet (800 mg total) by mouth every 8 (eight) hours as needed for mild pain (DO NOT BEGIN UNTIL 48 HOURS AFTER SURGERY), Disp: 15 tablet, Rfl: 0    ondansetron (ZOFRAN) 4 mg tablet, Take 1 tablet (4 mg total) by mouth every 8 (eight) hours as needed for nausea or vomiting, Disp: 20 tablet, Rfl: 0    Semaglutide-Weight Management (Wegovy) 1.7 MG/0.75ML, Inject 1.7 mg under the skin weekly, Disp: 9 mL, Rfl: 0    spironolactone (ALDACTONE) 50 mg tablet, Take one tablet by mouth daily., Disp: 90 tablet, Rfl: 3    Synthroid 137 MCG tablet, Take 1 tablet (137 mcg total) by mouth daily, Disp: 90 tablet, Rfl: 3    cholecalciferol (VITAMIN D3) 25 mcg (1,000 units) tablet, Take 4,000 units daily. (Patient not taking: Reported on 11/8/2024), Disp: , Rfl:     metroNIDAZOLE (METROCREAM) 0.75 % cream, Apply to face once a day. Avoid eyes and mouth. (Patient not taking: Reported on 6/10/2024), Disp: 30 g, Rfl: 3    tacrolimus (PROTOPIC) 0.1 % ointment, Apply to affected areas once a day. (Patient not taking: Reported on 6/10/2024), Disp: 100 g, Rfl: 6    tretinoin (RETIN-A) 0.025 %  "cream, Apply topically daily at bedtime Spread one pea-sized amount of medication over entire face about one hour before bedtime. (Patient not taking: Reported on 8/14/2024), Disp: 45 g, Rfl: 0    Current Facility-Administered Medications:     lidocaine (XYLOCAINE) 1 % injection 2 mL, 2 mL, Injection, , Simone Awan, LAXMIM, 2 mL at 05/25/22 1343    triamcinolone acetonide (KENALOG-40) 40 mg/mL injection 20 mg, 20 mg, Intra-articular, , Simone Awan DPM, 20 mg at 05/25/22 1343    Review of Systems          Objective:    Vitals:    11/08/24 1023   BP: 116/74   Pulse: 92   Resp: 16   Temp: 97.8 °F (36.6 °C)   TempSrc: Temporal   SpO2: 98%   Weight: 84.8 kg (187 lb)   Height: 5' 8.5\" (1.74 m)        Physical Exam  Constitutional:       Appearance: Normal appearance. She is well-developed and normal weight.   HENT:      Head: Normocephalic and atraumatic.   Neck:      Vascular: No carotid bruit.   Cardiovascular:      Rate and Rhythm: Normal rate and regular rhythm.      Pulses: Normal pulses.      Heart sounds: Normal heart sounds.   Pulmonary:      Effort: Pulmonary effort is normal.      Breath sounds: Normal breath sounds.   Musculoskeletal:         General: Normal range of motion.      Cervical back: Normal range of motion and neck supple.      Right lower leg: No edema.      Left lower leg: No edema.      Comments: Pain palpable lumbar region and muscles, full ROM, SLR neg, heel toe walk normal   Skin:     General: Skin is warm.   Neurological:      General: No focal deficit present.      Mental Status: She is alert and oriented to person, place, and time.      Sensory: No sensory deficit.      Motor: No weakness.      Gait: Gait normal.      Deep Tendon Reflexes: Reflexes normal.   Psychiatric:         Mood and Affect: Mood normal.         Behavior: Behavior normal.         Thought Content: Thought content normal.         Judgment: Judgment normal.               "

## 2024-11-25 ENCOUNTER — EVALUATION (OUTPATIENT)
Dept: PHYSICAL THERAPY | Facility: CLINIC | Age: 39
End: 2024-11-25
Payer: COMMERCIAL

## 2024-11-25 DIAGNOSIS — M54.50 ACUTE MIDLINE LOW BACK PAIN WITHOUT SCIATICA: ICD-10-CM

## 2024-11-25 PROCEDURE — 97161 PT EVAL LOW COMPLEX 20 MIN: CPT | Performed by: PHYSICAL THERAPIST

## 2024-11-25 PROCEDURE — 97112 NEUROMUSCULAR REEDUCATION: CPT | Performed by: PHYSICAL THERAPIST

## 2024-11-25 NOTE — PROGRESS NOTES
PT Evaluation     Today's date: 2024  Patient name: Priscilla Houser  : 1985  MRN: [id number]  Referring provider: Radha Reece P*  Dx:   Encounter Diagnosis     ICD-10-CM    1. Acute midline low back pain without sciatica  M54.50 Ambulatory Referral to Physical Therapy                     Assessment  Functional limitations: limited sitting tolerance, pain with lifting/bending forward    Assessment details: Priscilla Houser is a 40 yo female with acute on chronic low back pain presenting with signs and symptoms of SI instability. She demonstrates decreased core stability, decreased pelvic floor strength, and decreased strength of bilateral hip ab/adductors. Decreased pain after SI muscle energy technique. She is an excellent candidate for OPPT to address above impairments and optimize functional status.     Goals  4-6 weeks  1. Independent with HEP at discharge.  2. Increase B hip abd/add strength by at 1/2-1 MMT grade to indicate improved core stability.  3. Normalize recruitment of TA to indicate improved core stability.  4. Self manage symptoms with MET to increase independence in pain control.        Plan  Patient would benefit from: PT eval and skilled physical therapy  Planned modality interventions: low level laser therapy    Planned therapy interventions: manual therapy, neuromuscular re-education, patient/caregiver education, therapeutic activities, therapeutic exercise and home exercise program    Frequency: 1-2x week  Duration in weeks: 6  Treatment plan discussed with: patient  Plan details: Provided with and reviewed initial written HEP.  Reviewed physical exam findings and plan of care.  All questions answered to patient's satisfaction.          Subjective Evaluation    History of Present Illness  Mechanism of injury: Patient with acute on chronic midline low back pain without sciatica. Most recent episode for the last 1-2 weeks with no apparent cause for onset. Saw PCP and was  referred to OPPT. First episode 2 years ago was the worst. Denies B/B changes or leg weakness.     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  Exercise history: nil currently    Treatments  Previous treatment: chiropractic      Objective     Concurrent Complaints  Negative for bladder dysfunction and bowel dysfunction    Active Range of Motion     Lumbar   Flexion:  WFL  Extension:  WFL  Left lateral flexion:  WFL  Right lateral flexion: Active right lumbar lateral flexion: resolved after SI MET.  with pain Restriction level: minimal    Joint Play     Hypermobile: L1, L2, L3, L4 and L5     Pain: L3 and L4   Mechanical Assessment    Cervical      Thoracic      Lumbar    Standing extension: repeated movements  Pain location: no change  Right sidegliding: repeated movements  Pain location: no change    Strength/Myotome Testing     Lumbar   Left   Heel walk: normal  Toe walk: normal    Right   Heel walk: normal  Toe walk: normal    Left Hip   Planes of Motion   Flexion: 5  Extension: 5  Abduction: 4  Adduction: 4    Right Hip   Planes of Motion   Flexion: 5  Extension: 5  Abduction: 4  Adduction: 4    Left Knee   Flexion: 5  Extension: 5    Right Knee   Flexion: 5  Extension: 5    Left Ankle/Foot   Dorsiflexion: 5  Great toe extension: 5    Right Ankle/Foot   Dorsiflexion: 5  Great toe extension: 5    Muscle Activation     Additional Muscle Activation Details  Good recruitment, poor endurance of TA  Subjectively weak pelvic floor    Tests     Lumbar   Positive prone instability .     Left Hip   Positive long sit.     Additional Tests Details  L post inn       HEP provided as  follows:  Access Code: [email or web address]  URL: [email or web address]  Date: 2024  Prepared by: Brittney Alonso    Exercises  - Supine Transversus Abdominis Bracing with Pelvic Floor Contraction  - 1 x daily - 7 x weekly - 20 reps - 5 hold  - bent knee fall out  - 1 x daily - 7 x weekly - 20 reps  - Supine Bridge with Mini Swiss Ball Between Knees  - 1 x daily - 7 x weekly - 20 reps - 5 hold  - Sidelying Hip Abduction  - 1 x daily - 7 x weekly - 30 reps  - Sidelying Hip Adduction  - 1 x daily - 7 x weekly - 30 reps  - Quadruped Fire Hydrant  - 1 x daily - 7 x weekly - 30 reps  - Donkey Kick  - 1 x daily - 7 x weekly - 30 reps       Precautions: h/o  x3      Manuals             L post inn MET KT            Laser B sacral bases                                       Neuro Re-Ed             TA + PF             TA + PF + brige w/ ball sq             TA + PF + bent knee fall out             TA + PF + SL hip abd/add             TA + PF + heel taps             TA + PF + donkey kick             TA + PF + fire hydrant             TA + PF + overhead pull             TA + PF + press out             Pt ed HEP rev KT            Ther Ex                                                                                                                     Ther Activity                                       Gait Training                                       Modalities

## 2024-12-02 ENCOUNTER — APPOINTMENT (OUTPATIENT)
Dept: PHYSICAL THERAPY | Facility: CLINIC | Age: 39
End: 2024-12-02
Payer: COMMERCIAL

## 2024-12-03 ENCOUNTER — APPOINTMENT (OUTPATIENT)
Dept: PHYSICAL THERAPY | Facility: CLINIC | Age: 39
End: 2024-12-03
Payer: COMMERCIAL

## 2024-12-10 ENCOUNTER — TELEPHONE (OUTPATIENT)
Age: 39
End: 2024-12-10

## 2024-12-10 ENCOUNTER — OFFICE VISIT (OUTPATIENT)
Dept: PHYSICAL THERAPY | Facility: CLINIC | Age: 39
End: 2024-12-10
Payer: COMMERCIAL

## 2024-12-10 DIAGNOSIS — M54.50 ACUTE MIDLINE LOW BACK PAIN WITHOUT SCIATICA: Primary | ICD-10-CM

## 2024-12-10 PROCEDURE — 97112 NEUROMUSCULAR REEDUCATION: CPT | Performed by: PHYSICAL THERAPIST

## 2024-12-10 PROCEDURE — 97140 MANUAL THERAPY 1/> REGIONS: CPT | Performed by: PHYSICAL THERAPIST

## 2024-12-10 NOTE — PROGRESS NOTES
"Daily Note     Today's date: 12/10/2024  Patient name: Priscilla Houser  : 1985  MRN: [id number]  Referring provider: Radha Reece P*  Dx:   Encounter Diagnosis     ICD-10-CM    1. Acute midline low back pain without sciatica  M54.50                      Subjective: Pt reports her low back has been feeling better for the last week. Semi compliant with HEP due to recent illness.       Objective: See treatment diary below      Assessment: Normal long sit test. Good technique with exercises. Discussed with patient focusing on core stability for 4-6 weeks prior to starting functional strengthening.       Plan: Continue per plan of care.      Precautions: h/o  x3      Manuals 11/25 12/10           L post inn MET KT N/A           Laser B sacral bases  22W 4' KT           R upper trap  4' 22W KT                        Neuro Re-Ed             TA + PF  5\"x20           TA + PF + bridge w/ ball sq  5\"x20           TA + PF + bent knee fall out  20x           TA + PF + SL hip abd/add  20x ea           TA + PF + heel taps  20x           TA + PF + donkey kick  15x ea           TA + PF + fire hydrant  15x ea           TA + PF + overhead pull  20x 10#           TA + PF + press out             Pt ed HEP rev KT            Ther Ex                                                                                                                     Ther Activity                                       Gait Training                                       Modalities                                            "

## 2024-12-10 NOTE — TELEPHONE ENCOUNTER
Reason for call:   [x] Prior Auth  [] Other:     Caller:  [x] Patient  [] Pharmacy  Name:   Address:   Callback Number:     Medication:     Semaglutide-Weight Management (Wegovy) 1.7 MG/0.75ML       Dose/Frequency:     Inject 1.7 mg under the skin weekly       Quantity: 9 ml    Ordering Provider:   [x] PCP/Provider -   [] Speciality/Provider -     Has the patient tried other medications and failed? If failed, which medications did they fail?    [] No   [] Yes -     Is the patient's insurance updated in EPIC?   [x] Yes   [] No     Is a copy of the patient's insurance scanned in EPIC?   [x] Yes 1/7/22  [] No

## 2024-12-11 NOTE — TELEPHONE ENCOUNTER
PA for Wegovy 1.7mg SUBMITTED to [email or web address]    via    []CMM-KEY:   [x]Surescripts-Case ID #: [id number]   []Availity-Auth ID #   []Faxed to plan   []Other website   []Phone call Case ID #     []PA sent as URGENT    All office notes, labs and other pertaining documents and studies sent. Clinical questions answered. Awaiting determination from insurance company.     Turnaround time for your insurance to make a decision on your Prior Authorization can take 7-21 business days.

## 2024-12-13 NOTE — TELEPHONE ENCOUNTER
Shannan from [employer] called. Saw that Pt has been on the Wegovy. They need to know if the Pt has lost 5% of baseline weight since being on the wegovy?    Please review and return call to [employer] at [phone]. Thank you

## 2024-12-13 NOTE — TELEPHONE ENCOUNTER
Patient called to ask if PA for Wegovy 1.7 has gone through because pharmacy has not released it. I see error on medication PA. Patient is due for next injection tomorrow. Does PA need to be resubmitted or further clinical documentation obtained?

## 2024-12-16 ENCOUNTER — OFFICE VISIT (OUTPATIENT)
Dept: FAMILY MEDICINE CLINIC | Facility: CLINIC | Age: 39
End: 2024-12-16
Payer: COMMERCIAL

## 2024-12-16 VITALS
RESPIRATION RATE: 16 BRPM | BODY MASS INDEX: 27.85 KG/M2 | HEIGHT: 69 IN | SYSTOLIC BLOOD PRESSURE: 134 MMHG | OXYGEN SATURATION: 98 % | WEIGHT: 188 LBS | HEART RATE: 84 BPM | DIASTOLIC BLOOD PRESSURE: 84 MMHG | TEMPERATURE: 97.3 F

## 2024-12-16 DIAGNOSIS — E66.09 CLASS 1 OBESITY DUE TO EXCESS CALORIES WITHOUT SERIOUS COMORBIDITY WITH BODY MASS INDEX (BMI) OF 30.0 TO 30.9 IN ADULT: ICD-10-CM

## 2024-12-16 DIAGNOSIS — E66.811 CLASS 1 OBESITY DUE TO EXCESS CALORIES WITHOUT SERIOUS COMORBIDITY WITH BODY MASS INDEX (BMI) OF 30.0 TO 30.9 IN ADULT: ICD-10-CM

## 2024-12-16 PROCEDURE — 99213 OFFICE O/P EST LOW 20 MIN: CPT | Performed by: PHYSICIAN ASSISTANT

## 2024-12-16 RX ORDER — SEMAGLUTIDE 1.7 MG/.75ML
INJECTION, SOLUTION SUBCUTANEOUS
Qty: 9 ML | Refills: 0 | Status: SHIPPED | OUTPATIENT
Start: 2024-12-16

## 2024-12-16 NOTE — PROGRESS NOTES
Assessment/Plan:    Obesity - on wegovy 1.7 mg with success, would like to continue at this dose    F/u as needed    Subjective:   Chief Complaint   Patient presents with    Medication Management      Patient ID: Priscilla Houser is a 39 y.o. female.    Patient on wegovy 1.7 mg with success in treating her obesity. Has coupled this with diet and exercise. Currently in PT and has had to cut back on exercise at home due to back pain but following PT guidelines for exercise and stretching and strengthening. Would like to continue at the 1.7 dose as has had struggles going up on dose taking zofran prn for nausea.        The following portions of the patient's history were reviewed and updated as appropriate: allergies, current medications, past family history, past medical history, past social history, past surgical history, and problem list.    Past Medical History:   Diagnosis Date    Abnormal Pap smear of cervix     AMA (advanced maternal age) multigravida 35+     Cystic fibrosis carrier     Disease of thyroid gland     hashimotos thyroiditis, nodule, hypo    Female infertility     3 years of infertilty treatments; this is a spontaneous pregnancy    HPV (human papilloma virus) infection     Migraine     PVC (premature ventricular contraction)     daily had them intermittently    PVC (premature ventricular contraction)     Varicella     childhood    Vitiligo      Past Surgical History:   Procedure Laterality Date     SECTION  2013     SECTION  2019    COLPOSCOPY      DILATION AND CURETTAGE OF UTERUS      age 18 with TOP, and SAB in 2017    LIPOMA RESECTION      OPERATIVE HYSTEROSCOPY      removal of septum    [place] BREAST REDUCTION Bilateral 2024    Procedure: BILATERAL BREAST REDUCTION;  Surgeon: Ayaka Davidson DO;  Location:  MAIN OR;  Service: Plastics    [place]  DELIVERY ONLY N/A 2019    Procedure:  SECTION () REPEAT;  Surgeon: Sheba Velasquez MD;   Location: [facility];  Service: Obstetrics    [place]  DELIVERY ONLY N/A 10/27/2020    Procedure:  SECTION () REPEAT;  Surgeon: Kamini Garcia MD;  Location: [facility];  Service: Obstetrics    [place] EXC B9 LESION MRGN XCP SK TG T/A/L 3.1-4.0 CM Left 10/10/2022    Procedure: EXCISION of LEFT SHOULDER MASS;  Surgeon: Angel Winter DO;  Location: [facility];  Service: General    [place] EXCISION EXCESSIVE SKIN & SUBQ TISSUE ABDOMEN N/A 2024    Procedure: ABDOMINOPLASTY;  Surgeon: Ayaka Davidson DO;  Location:  MAIN OR;  Service: Plastics    [place] LIG/TRNSXJ FALOPIAN TUBE  DEL/ABDML SURG Bilateral 10/27/2020    Procedure: LIGATION/COAGULATION TUBAL;  Surgeon: Kamini Garcia MD;  Location: [facility];  Service: Obstetrics    REDUCTION MAMMAPLASTY Bilateral     ?    TUBAL LIGATION      US GUIDED THYROID BIOPSY  2015     Family History   Problem Relation Age of Onset    Hypertension Mother     Multiple sclerosis Mother     Hashimoto's thyroiditis Mother     Factor V Leiden deficiency Mother     Other Mother         ms    Transient ischemic attack Father     Thyroid disease Sister     Factor V Leiden deficiency Sister     Factor V Leiden deficiency Sister     Celiac disease Sister     No Known Problems Daughter     No Known Problems Daughter     COPD Maternal Grandmother     Deep vein thrombosis Maternal Grandfather     Diabetes Paternal Grandmother         TYPE 2    Cancer Paternal Grandfather         [email or web address]    Hashimoto's thyroiditis Brother     Lupus Brother     No Known Problems Son     Cystic fibrosis Maternal Aunt 8    No Known Problems Maternal Uncle     No Known Problems Paternal Aunt     No Known Problems Paternal Aunt     No Known Problems Paternal Aunt     No Known Problems Paternal Uncle     Breast cancer Neg Hx     Colon cancer Neg Hx      Social History     Socioeconomic History    Marital status: /Civil Union     Spouse name: Not on file     Number of children: Not on file    Years of education: Not on file    Highest education level: Not on file   Occupational History    Not on file   Tobacco Use    Smoking status: Never    Smokeless tobacco: Never   Vaping Use    Vaping status: Never Used   Substance and Sexual Activity    Alcohol use: Not Currently     Comment: Socially 1-2 per month    Drug use: Never    Sexual activity: Yes     Partners: Male     Birth control/protection: None   Other Topics Concern    Not on file   Social History Narrative    CAFFEINE USE     Social Drivers of Health     Financial Resource Strain: Not on file   Food Insecurity: Not on file   Transportation Needs: Not on file   Physical Activity: Not on file   Stress: Not on file   Social Connections: Not on file   Intimate Partner Violence: Not on file   Housing Stability: Not on file       Current Outpatient Medications:     ibuprofen (MOTRIN) 800 mg tablet, Take 1 tablet (800 mg total) by mouth every 8 (eight) hours as needed for mild pain (DO NOT BEGIN UNTIL 48 HOURS AFTER SURGERY), Disp: 15 tablet, Rfl: 0    ondansetron (ZOFRAN) 4 mg tablet, Take 1 tablet (4 mg total) by mouth every 8 (eight) hours as needed for nausea or vomiting, Disp: 20 tablet, Rfl: 0    Semaglutide-Weight Management (Wegovy) 1.7 MG/0.75ML, Inject 1.7 mg under the skin weekly, Disp: 9 mL, Rfl: 0    spironolactone (ALDACTONE) 50 mg tablet, Take one tablet by mouth daily., Disp: 90 tablet, Rfl: 3    Synthroid 137 MCG tablet, Take 1 tablet (137 mcg total) by mouth daily, Disp: 90 tablet, Rfl: 3    cholecalciferol (VITAMIN D3) 25 mcg (1,000 units) tablet, Take 4,000 units daily. (Patient not taking: Reported on 11/8/2024), Disp: , Rfl:     metroNIDAZOLE (METROCREAM) 0.75 % cream, Apply to face once a day. Avoid eyes and mouth. (Patient not taking: Reported on 6/10/2024), Disp: 30 g, Rfl: 3    tacrolimus (PROTOPIC) 0.1 % ointment, Apply to affected areas once a day. (Patient not taking: Reported on 6/10/2024),  "Disp: 100 g, Rfl: 6    tretinoin (RETIN-A) 0.025 % cream, Apply topically daily at bedtime Spread one pea-sized amount of medication over entire face about one hour before bedtime. (Patient not taking: Reported on 8/14/2024), Disp: 45 g, Rfl: 0    Current Facility-Administered Medications:     lidocaine (XYLOCAINE) 1 % injection 2 mL, 2 mL, Injection, , Simone Awan, ANYA, 2 mL at 05/25/22 1343    triamcinolone acetonide (KENALOG-40) 40 mg/mL injection 20 mg, 20 mg, Intra-articular, , Simone Awan DPM, 20 mg at 05/25/22 1343    Review of Systems          Objective:    Vitals:    12/16/24 0925   BP: 134/84   Pulse: 84   Resp: 16   Temp: (!) 97.3 °F (36.3 °C)   TempSrc: Temporal   SpO2: 98%   Weight: 85.3 kg (188 lb)   Height: 5' 8.5\" (1.74 m)        Physical Exam  Constitutional:       Appearance: Normal appearance.   HENT:      Head: Normocephalic and atraumatic.   Neurological:      General: No focal deficit present.      Mental Status: She is alert and oriented to person, place, and time.   Psychiatric:         Mood and Affect: Mood normal.         Behavior: Behavior normal.         Thought Content: Thought content normal.         Judgment: Judgment normal.               "

## 2024-12-20 ENCOUNTER — APPOINTMENT (OUTPATIENT)
Dept: PHYSICAL THERAPY | Facility: CLINIC | Age: 39
End: 2024-12-20
Payer: COMMERCIAL

## 2025-01-03 ENCOUNTER — OFFICE VISIT (OUTPATIENT)
Dept: PHYSICAL THERAPY | Facility: CLINIC | Age: 40
End: 2025-01-03
Payer: COMMERCIAL

## 2025-01-03 DIAGNOSIS — M54.50 ACUTE MIDLINE LOW BACK PAIN WITHOUT SCIATICA: Primary | ICD-10-CM

## 2025-01-03 PROCEDURE — 97110 THERAPEUTIC EXERCISES: CPT | Performed by: PHYSICAL THERAPIST

## 2025-01-03 PROCEDURE — 97112 NEUROMUSCULAR REEDUCATION: CPT | Performed by: PHYSICAL THERAPIST

## 2025-01-03 NOTE — PROGRESS NOTES
"Daily Note     Today's date: 1/3/2025  Patient name: Priscilla Houser  : 1985  MRN: [id number]  Referring provider: Radha Reece P*  Dx:   Encounter Diagnosis     ICD-10-CM    1. Acute midline low back pain without sciatica  M54.50                      Subjective: Pt reports overall feeling well; 60% improvement. Occasional pain with forward flexion.       Objective: See treatment diary below      Assessment: Tolerated treatment well. Patient exhibited good technique with therapeutic exercises and would benefit from continued PT. No pain with progression of program to functional strengthening. Updated written HEP.       Plan: Continue per plan of care.  Follow up in 2 weeks.      Precautions: h/o  x3      Manuals 11/25 12/10 1/3          L post inn MET KT N/A           Laser B sacral bases  22W 4' KT 22W 4' KT          R upper trap  4' 22W KT                        Neuro Re-Ed             TA + PF  5\"x20 5\"x20          TA + PF + bridge w/ ball sq  5\"x20 5\"x20          TA + PF + bent knee fall out  20x           TA + PF + SL hip abd/add  20x ea 20x ea          TA + PF + heel taps  20x           TA + PF + donkey kick  15x ea 20x ea          TA + PF + fire hydrant  15x ea 20x ea          TA + PF + overhead pull  20x 10#           TA + PF + press out             Pt ed HEP rev KT            Ther Ex             TA + slider lunge rev/lat   2x10 ea          TA + squat   20x          TA + RDL   20x 5# KB                                                                           Ther Activity                                       Gait Training                                       Modalities                                              "

## 2025-01-06 ENCOUNTER — TELEPHONE (OUTPATIENT)
Dept: DERMATOLOGY | Facility: CLINIC | Age: 40
End: 2025-01-06

## 2025-01-17 DIAGNOSIS — E66.09 CLASS 1 OBESITY DUE TO EXCESS CALORIES WITHOUT SERIOUS COMORBIDITY WITH BODY MASS INDEX (BMI) OF 30.0 TO 30.9 IN ADULT: ICD-10-CM

## 2025-01-17 DIAGNOSIS — E66.811 CLASS 1 OBESITY DUE TO EXCESS CALORIES WITHOUT SERIOUS COMORBIDITY WITH BODY MASS INDEX (BMI) OF 30.0 TO 30.9 IN ADULT: ICD-10-CM

## 2025-01-17 RX ORDER — SEMAGLUTIDE 1.7 MG/.75ML
INJECTION, SOLUTION SUBCUTANEOUS
Qty: 9 ML | Refills: 0 | Status: SHIPPED | OUTPATIENT
Start: 2025-01-17

## 2025-01-28 ENCOUNTER — OFFICE VISIT (OUTPATIENT)
Age: 40
End: 2025-01-28

## 2025-01-28 ENCOUNTER — TELEPHONE (OUTPATIENT)
Age: 40
End: 2025-01-28

## 2025-01-28 DIAGNOSIS — Z41.1 ENCOUNTER FOR COSMETIC SURGERY: Primary | ICD-10-CM

## 2025-01-28 PROCEDURE — RECHECK: Performed by: STUDENT IN AN ORGANIZED HEALTH CARE EDUCATION/TRAINING PROGRAM

## 2025-01-28 NOTE — TELEPHONE ENCOUNTER
Patient returning Fela lamb about moving her appointment to earlier today patient is going to come in at 1:15 to see Dr. Davidson

## 2025-01-28 NOTE — TELEPHONE ENCOUNTER
Lvm for pt regarding appt today with Dr Davidson at 4 pm. Dr Davidson is having a change in the afternoon and we need to move pt up to anytime in the morning or early afternoon. Advised pt to call back as soon as possible.

## 2025-02-03 NOTE — PROGRESS NOTES
Patient seen and examined.  Doing very well.  Denies issues.  Continues to lose weight and feels energetic.  She is very happy with her results.    Much improved contour of her breasts, well healed incisions, NAC slightly large and high  Excellent abdominal contour with small skin only standing cone on right and central scar tethering, small widened umbilical scar inferiorly    We discussed small scar revisions in the office at her convenience.  Booking form created.    Ayaka Davidson,   Plastic and Reconstructive Surgery

## 2025-03-20 DIAGNOSIS — E66.811 CLASS 1 OBESITY DUE TO EXCESS CALORIES WITHOUT SERIOUS COMORBIDITY WITH BODY MASS INDEX (BMI) OF 30.0 TO 30.9 IN ADULT: ICD-10-CM

## 2025-03-20 DIAGNOSIS — E66.09 CLASS 1 OBESITY DUE TO EXCESS CALORIES WITHOUT SERIOUS COMORBIDITY WITH BODY MASS INDEX (BMI) OF 30.0 TO 30.9 IN ADULT: ICD-10-CM

## 2025-03-21 RX ORDER — SEMAGLUTIDE 1.7 MG/.75ML
INJECTION, SOLUTION SUBCUTANEOUS
Qty: 9 ML | Refills: 0 | Status: SHIPPED | OUTPATIENT
Start: 2025-03-21

## 2025-05-20 ENCOUNTER — TELEPHONE (OUTPATIENT)
Dept: ADMINISTRATIVE | Facility: HOSPITAL | Age: 40
End: 2025-05-20

## 2025-06-16 ENCOUNTER — PROCEDURE VISIT (OUTPATIENT)
Age: 40
End: 2025-06-16

## 2025-06-16 DIAGNOSIS — Z41.1 ENCOUNTER FOR COSMETIC SURGERY: Primary | ICD-10-CM

## 2025-06-16 PROCEDURE — RECHECK: Performed by: STUDENT IN AN ORGANIZED HEALTH CARE EDUCATION/TRAINING PROGRAM

## 2025-06-16 NOTE — PROGRESS NOTES
After informed consent, the marked areas on the abdomen and breasts were infiltrated with local anesthetic.  The marked areas of excess skin were excised sharply.  Hemostasis was obtained.  The skin was close using 4-0 vicryl and 4-0 monocryl.  The areas were cleansed and glue was applied.  Steristrips were placed once the glue was dry.    The patient tolerated the procedure well and post-procedure instructions were reviewed.    Of note, the patient has lost nearly 30 lbs since surgery.  She notes some lipodystrophy of her buttock region and inquired about in-office liposuction to this area.  Booking form created.    Ayaka Davidson,   Plastic and Reconstructive Surgery

## 2025-06-24 ENCOUNTER — COSMETIC (OUTPATIENT)
Age: 40
End: 2025-06-24

## 2025-06-24 DIAGNOSIS — Z41.1 ENCOUNTER FOR COSMETIC SURGERY: Primary | ICD-10-CM

## 2025-06-24 PROCEDURE — RECHECK

## 2025-06-24 NOTE — PROGRESS NOTES
[facility] Plastic and Reconstructive Surgery  [phone]    Patient Identification: Priscilla Houser is a 39 y.o. female     History of Present Illness: The patient is a 39 y.o.  year-old female  who presents to the office for post procedure visit. Patient is s/p Bilateral Breast Reduction - Bilateral and Abdominoplasty  on 1/11/2024 by Dr. Davidson. She underwent an in office procedure 6/16/25 with Dr. Davidson. Patient reports some pain/discomfort more so in her breasts for 1-2 days post procedure. States she took it easy for a few days and has since been fine. Denies fevers, chills, erythema, or drainage.    She reports lateral left hip seems slightly larger than the right side which she would like to know options for addressing.     Past Medical History[1]       Review of Systems  Constitutional: Denies fevers, chills or pain.  Breast: + bruising, + pain  Skin: Denies any warmth, erythema, edema  or drainage.     Physical Exam  General: AAOx3, NAD, well appearing  Breast: Steri strips in place over lateral incisions, removed. Incisions clean, dry, and intact. Suture ends present- removed. See media  Abdomen: Steri strips in place over lateral incisions, removed. Incisions clean, dry, and intact. Suture ends present- removed. Left lateral hip with slightly more soft tissue than right. See media      Assessment and Plan:  The patient is an 39 y.o.  year-old female who presents to the office for post procedure visit. Patient is s/p Bilateral Breast Reduction - Bilateral and Abdominoplasty  on 1/11/2024 by Dr. Davidson. Patient had an in office procedure on 6/16/25 by Dr. Lety Davidson present at today's visit and in agreement with the assessment and plan.    -At today's visit steri strips and suture ends removed. Dr. Davidson discussed ways of addressing lateral hip region such as liposuction to the area vs extending incision slightly.   -Start daily scar massage of incisions  -Start applying Aquaphor to  incisions  -Booking form created for in office procedure   -Candi to call patient to schedule in office procedure   -The patient is to return as needed with concerns  -The patient is to call the office with any questions or concerns. All of the patient's questions were answered at this time and they agree with the plan of care.      Shanthi Alvarado PA-C  [facility] Plastic and Reconstructive Surgery           [1]   Past Medical History:  Diagnosis Date    Abnormal Pap smear of cervix 2007    AMA (advanced maternal age) multigravida 35+     Cystic fibrosis carrier     Disease of thyroid gland     hashimotos thyroiditis, nodule, hypo    Female infertility     3 years of infertilty treatments; this is a spontaneous pregnancy    HPV (human papilloma virus) infection     Migraine     PVC (premature ventricular contraction)     daily had them intermittently    PVC (premature ventricular contraction)     Varicella     childhood    Vitiligo

## 2025-07-15 DIAGNOSIS — E66.811 CLASS 1 OBESITY DUE TO EXCESS CALORIES WITHOUT SERIOUS COMORBIDITY WITH BODY MASS INDEX (BMI) OF 30.0 TO 30.9 IN ADULT: ICD-10-CM

## 2025-07-15 DIAGNOSIS — E66.09 CLASS 1 OBESITY DUE TO EXCESS CALORIES WITHOUT SERIOUS COMORBIDITY WITH BODY MASS INDEX (BMI) OF 30.0 TO 30.9 IN ADULT: ICD-10-CM

## 2025-07-16 RX ORDER — SEMAGLUTIDE 1.7 MG/.75ML
INJECTION, SOLUTION SUBCUTANEOUS
Qty: 9 ML | Refills: 0 | Status: SHIPPED | OUTPATIENT
Start: 2025-07-16

## 2025-08-06 ENCOUNTER — PATIENT MESSAGE (OUTPATIENT)
Age: 40
End: 2025-08-06

## 2025-08-18 ENCOUNTER — ANNUAL EXAM (OUTPATIENT)
Age: 40
End: 2025-08-18
Payer: COMMERCIAL

## 2025-08-18 VITALS
DIASTOLIC BLOOD PRESSURE: 82 MMHG | SYSTOLIC BLOOD PRESSURE: 122 MMHG | HEIGHT: 68 IN | WEIGHT: 178.6 LBS | BODY MASS INDEX: 27.07 KG/M2

## 2025-08-18 DIAGNOSIS — E03.9 HYPOTHYROIDISM, UNSPECIFIED TYPE: ICD-10-CM

## 2025-08-18 DIAGNOSIS — N39.0 POSTCOITAL UTI: ICD-10-CM

## 2025-08-18 DIAGNOSIS — Z01.419 ROUTINE GYNECOLOGICAL EXAMINATION: Primary | ICD-10-CM

## 2025-08-18 PROCEDURE — S0612 ANNUAL GYNECOLOGICAL EXAMINA: HCPCS | Performed by: OBSTETRICS & GYNECOLOGY

## 2025-08-18 RX ORDER — NITROFURANTOIN 25; 75 MG/1; MG/1
100 CAPSULE ORAL ONCE AS NEEDED
Qty: 30 CAPSULE | Refills: 2 | Status: SHIPPED | OUTPATIENT
Start: 2025-08-18

## 2025-08-19 ENCOUNTER — APPOINTMENT (OUTPATIENT)
Dept: LAB | Facility: CLINIC | Age: 40
End: 2025-08-19
Payer: COMMERCIAL

## 2025-08-19 DIAGNOSIS — L70.0 ACNE VULGARIS: ICD-10-CM

## 2025-08-19 DIAGNOSIS — E03.9 HYPOTHYROIDISM, UNSPECIFIED TYPE: ICD-10-CM

## 2025-08-19 LAB
T4 FREE SERPL-MCNC: 0.7 NG/DL (ref 0.61–1.12)
TSH SERPL DL<=0.05 MIU/L-ACNC: 0.53 UIU/ML (ref 0.45–4.5)

## 2025-08-19 PROCEDURE — 36415 COLL VENOUS BLD VENIPUNCTURE: CPT

## 2025-08-19 PROCEDURE — 84443 ASSAY THYROID STIM HORMONE: CPT

## 2025-08-19 PROCEDURE — 84439 ASSAY OF FREE THYROXINE: CPT

## 2025-08-21 RX ORDER — SPIRONOLACTONE 50 MG/1
TABLET, FILM COATED ORAL
Qty: 90 TABLET | Refills: 1 | Status: SHIPPED | OUTPATIENT
Start: 2025-08-21

## (undated) DEVICE — CHLORAPREP HI-LITE 26ML ORANGE

## (undated) DEVICE — CANNISTER WASTE IMPLOSION PROOF

## (undated) DEVICE — TRAY FOLEY 16FR URIMETER SURESTEP

## (undated) DEVICE — STERILE POLYISOPRENE POWDER-FREE SURGICAL GLOVES: Brand: PROTEXIS

## (undated) DEVICE — DRAIN HUBLESS 15FR 3 1/16IN

## (undated) DEVICE — CRADLE EXTREMITY UNIVERSAL CONTOURED

## (undated) DEVICE — STERILE POLYISOPRENE POWDER-FREE SURGICAL GLOVES WITH EMOLLIENT COATING: Brand: PROTEXIS

## (undated) DEVICE — SUT VICRYL 0 CTX 36 IN J978H

## (undated) DEVICE — INTENDED FOR TISSUE SEPARATION, AND OTHER PROCEDURES THAT REQUIRE A SHARP SURGICAL BLADE TO PUNCTURE OR CUT.: Brand: BARD-PARKER ® SAFETYLOCK CARBON RIB-BACK BLADES

## (undated) DEVICE — MEDI-VAC YANKAUER SUCTION HANDLE W/STRAIGHT TIP & CONTROL VENT: Brand: CARDINAL HEALTH

## (undated) DEVICE — GLOVE INDICATOR PI UNDERGLOVE SZ 7.5 BLUE

## (undated) DEVICE — LIGHT GLOVE GREEN

## (undated) DEVICE — ASTOUND STANDARD SURGICAL GOWN, XL: Brand: CONVERTORS

## (undated) DEVICE — ABDOMINAL PAD: Brand: DERMACEA

## (undated) DEVICE — SKIN MARKER DUAL TIP WITH RULER CAP, FLEXIBLE RULER AND LABELS: Brand: DEVON

## (undated) DEVICE — NEPTUNE E-SEP SMOKE EVACUATION PENCIL, COATED, 70MM BLADE, PUSH BUTTON SWITCH: Brand: NEPTUNE E-SEP

## (undated) DEVICE — 3M™ STERI-STRIP™ COMPOUND BENZOIN TINCTURE 40 BAGS/CARTON 4 CARTONS/CASE C1544: Brand: 3M™ STERI-STRIP™

## (undated) DEVICE — 3M™ STERI-STRIP™ REINFORCED ADHESIVE SKIN CLOSURES, R1547, 1/2 IN X 4 IN (12 MM X 100 MM), 6 STRIPS/ENVELOPE: Brand: 3M™ STERI-STRIP™

## (undated) DEVICE — SUT PDS II 0 CT-1 27 IN Z340H

## (undated) DEVICE — NEEDLE 21 G X 1 1/2 SAFETY

## (undated) DEVICE — SUT VICRYL 0 CT-1 36 IN J946H

## (undated) DEVICE — GLOVE SRG BIOGEL 7

## (undated) DEVICE — SUT PLAIN 2-0 CTX 27 IN 872H

## (undated) DEVICE — BETHLEHEM UNIVERSAL MINOR GEN: Brand: CARDINAL HEALTH

## (undated) DEVICE — SUT PROLENE 2-0 SH 30 IN 8833H

## (undated) DEVICE — PACKING VAGINAL 2 IN

## (undated) DEVICE — BULB SYRINGE,IRRIGATION WITH PROTECTIVE CAP: Brand: DOVER

## (undated) DEVICE — NEEDLE BLUNT 18 G X 1 1/2IN

## (undated) DEVICE — GLOVE SRG BIOGEL ECLIPSE 7

## (undated) DEVICE — SYRINGE 10ML LL

## (undated) DEVICE — STANDARD SURGICAL GOWN, L: Brand: CONVERTORS

## (undated) DEVICE — 3M™ TEGADERM™ TRANSPARENT FILM DRESSING FRAME STYLE, 1624W, 2-3/8 IN X 2-3/4 IN (6 CM X 7 CM), 100/CT 4CT/CASE: Brand: 3M™ TEGADERM™

## (undated) DEVICE — SUT VICRYL 4-0 KS 27 IN J662H

## (undated) DEVICE — ADHESIVE SKIN HIGH VISCOSITY EXOFIN 1ML

## (undated) DEVICE — INTENDED FOR TISSUE SEPARATION, AND OTHER PROCEDURES THAT REQUIRE A SHARP SURGICAL BLADE TO PUNCTURE OR CUT.: Brand: BARD-PARKER SAFETY BLADES SIZE 11, STERILE

## (undated) DEVICE — INTENDED FOR TISSUE SEPARATION, AND OTHER PROCEDURES THAT REQUIRE A SHARP SURGICAL BLADE TO PUNCTURE OR CUT.: Brand: BARD-PARKER SAFETY BLADES SIZE 15, STERILE

## (undated) DEVICE — OCCLUSIVE GAUZE STRIP,3% BISMUTH TRIBROMOPHENATE IN PETROLATUM BLEND: Brand: XEROFORM

## (undated) DEVICE — CANNULA 4MM MERCEDES 30CM 10MM PORT

## (undated) DEVICE — SUT VICRYL 0 CT-1 27 IN J260H

## (undated) DEVICE — SUT VICRYL 3-0 SH 27 IN J416H

## (undated) DEVICE — PLUMEPEN PRO 10FT

## (undated) DEVICE — TUBING ASPIRATION LIPOSUCTION SET 12FT

## (undated) DEVICE — PACK C-SECTION PBDS

## (undated) DEVICE — BINDER ABDOMINAL 46-62 IN

## (undated) DEVICE — SUT PDS II 4-0 PS-2 18 IN Z496G

## (undated) DEVICE — SINGLE PORT MANIFOLD: Brand: NEPTUNE 2

## (undated) DEVICE — SUT MONOCRYL PLUS 5-0 PC12 18 IN MCP834G

## (undated) DEVICE — TUBING SUCTION 5MM X 12 FT

## (undated) DEVICE — ASTOUND STANDARD SURGICAL GOWN, XXL: Brand: CONVERTORS

## (undated) DEVICE — Device

## (undated) DEVICE — BASIC SINGLE BASIN-LF: Brand: MEDLINE INDUSTRIES, INC.

## (undated) DEVICE — JACKSON-PRATT 100CC BULB RESERVOIR: Brand: CARDINAL HEALTH

## (undated) DEVICE — GLOVE INDICATOR PI UNDERGLOVE SZ 7 BLUE

## (undated) DEVICE — HYDROPHILIC WOUND DRESSING WITH ZINC PLUS VITAMINS A AND B6.: Brand: DERMAGRAN®-B

## (undated) DEVICE — SUT ETHILON 2-0 FS 18 IN 664H

## (undated) DEVICE — ADHESIVE SKN CLSR HISTOACRYL FLEX 0.5ML LF

## (undated) DEVICE — PACK UNIVERSAL DRAPES SUB-Q ICD

## (undated) DEVICE — 1820 FOAM BLOCK NEEDLE COUNTER: Brand: DEVON

## (undated) DEVICE — ARGYLE YANKAUER BULB TIP, NO VENT WITH TUBING 1/4” X 6’ (6 MM X 1.8 M): Brand: ARGYLE

## (undated) DEVICE — DRAPE SHEET THREE QUARTER

## (undated) DEVICE — TUBING INFILTRATION 9FT

## (undated) DEVICE — SUT CHROMIC 5-0 P-3 18 IN 687G

## (undated) DEVICE — SPONGE LAP 18 X 18 IN STRL RFD

## (undated) DEVICE — TELFA NON-ADHERENT ABSORBENT DRESSING: Brand: TELFA

## (undated) DEVICE — SUT PDS II 2-0 CT-1 27 IN Z339H

## (undated) DEVICE — SYRINGE 30ML LL

## (undated) DEVICE — CHEST/BREAST DRAPE: Brand: CONVERTORS

## (undated) DEVICE — PROXIMATE SKIN STAPLERS (35 WIDE) CONTAINS 35 STAINLESS STEEL STAPLES (FIXED HEAD): Brand: PROXIMATE

## (undated) DEVICE — SUT VICRYL 0 CTXB 36 IN JB978

## (undated) DEVICE — SCD SEQUENTIAL COMPRESSION COMFORT SLEEVE MEDIUM KNEE LENGTH: Brand: KENDALL SCD

## (undated) DEVICE — SUT MONOCRYL 4-0 PS-2 18 IN Y496G

## (undated) DEVICE — ELECTRODE EZ CLEAN BLADE -0012

## (undated) DEVICE — SUT MONOCRYL 3-0 SH 27 IN Y416H

## (undated) DEVICE — SUT VICRYL 2-0 SH 27 IN UNDYED J417H

## (undated) DEVICE — STAPLER INSORB SUBCUTICULAR 30 SINGLE USE

## (undated) DEVICE — ADHESIVE SKIN HIGH VISCOSITY EXOFIN PRECISION PEN

## (undated) DEVICE — GLOVE SRG BIOGEL ORTHOPEDIC 6.5

## (undated) DEVICE — SUT MONOCRYL 5-0 P-3 18 IN Y493G

## (undated) DEVICE — SUT SILK 0 SH 30 IN K834H

## (undated) DEVICE — DRAPE TOWEL: Brand: CONVERTORS

## (undated) DEVICE — DISPOSABLE OR TOWEL: Brand: CARDINAL HEALTH